# Patient Record
Sex: MALE | Race: BLACK OR AFRICAN AMERICAN | NOT HISPANIC OR LATINO | Employment: OTHER | URBAN - METROPOLITAN AREA
[De-identification: names, ages, dates, MRNs, and addresses within clinical notes are randomized per-mention and may not be internally consistent; named-entity substitution may affect disease eponyms.]

---

## 2017-01-06 ENCOUNTER — OFFICE VISIT (OUTPATIENT)
Dept: FAMILY MEDICINE | Facility: CLINIC | Age: 82
End: 2017-01-06
Payer: MEDICARE

## 2017-01-06 ENCOUNTER — LAB VISIT (OUTPATIENT)
Dept: LAB | Facility: HOSPITAL | Age: 82
End: 2017-01-06
Attending: FAMILY MEDICINE
Payer: MEDICARE

## 2017-01-06 VITALS
HEART RATE: 81 BPM | TEMPERATURE: 98 F | WEIGHT: 154.31 LBS | SYSTOLIC BLOOD PRESSURE: 139 MMHG | OXYGEN SATURATION: 98 % | DIASTOLIC BLOOD PRESSURE: 86 MMHG | BODY MASS INDEX: 24.8 KG/M2 | HEIGHT: 66 IN

## 2017-01-06 DIAGNOSIS — K92.2 GASTROINTESTINAL HEMORRHAGE, UNSPECIFIED GASTROINTESTINAL HEMORRHAGE TYPE: ICD-10-CM

## 2017-01-06 DIAGNOSIS — D50.9 IRON DEFICIENCY ANEMIA, UNSPECIFIED IRON DEFICIENCY ANEMIA TYPE: ICD-10-CM

## 2017-01-06 DIAGNOSIS — I10 BENIGN ESSENTIAL HYPERTENSION: ICD-10-CM

## 2017-01-06 DIAGNOSIS — K92.2 GASTROINTESTINAL HEMORRHAGE, UNSPECIFIED GASTROINTESTINAL HEMORRHAGE TYPE: Primary | ICD-10-CM

## 2017-01-06 DIAGNOSIS — Z79.82 LONG-TERM USE OF ASPIRIN THERAPY: ICD-10-CM

## 2017-01-06 LAB
ALBUMIN SERPL BCP-MCNC: 3.2 G/DL
ALP SERPL-CCNC: 81 U/L
ALT SERPL W/O P-5'-P-CCNC: 9 U/L
ANION GAP SERPL CALC-SCNC: 7 MMOL/L
AST SERPL-CCNC: 17 U/L
BASOPHILS # BLD AUTO: 0.07 K/UL
BASOPHILS NFR BLD: 0.9 %
BILIRUB SERPL-MCNC: 0.5 MG/DL
BUN SERPL-MCNC: 9 MG/DL
CALCIUM SERPL-MCNC: 9 MG/DL
CHLORIDE SERPL-SCNC: 107 MMOL/L
CO2 SERPL-SCNC: 27 MMOL/L
CREAT SERPL-MCNC: 1.2 MG/DL
DIFFERENTIAL METHOD: ABNORMAL
EOSINOPHIL # BLD AUTO: 0.2 K/UL
EOSINOPHIL NFR BLD: 2.3 %
ERYTHROCYTE [DISTWIDTH] IN BLOOD BY AUTOMATED COUNT: 15.6 %
EST. GFR  (AFRICAN AMERICAN): >60 ML/MIN/1.73 M^2
EST. GFR  (NON AFRICAN AMERICAN): 54 ML/MIN/1.73 M^2
GLUCOSE SERPL-MCNC: 94 MG/DL
HCT VFR BLD AUTO: 40.2 %
HGB BLD-MCNC: 12.6 G/DL
LYMPHOCYTES # BLD AUTO: 1.4 K/UL
LYMPHOCYTES NFR BLD: 16.8 %
MCH RBC QN AUTO: 27.8 PG
MCHC RBC AUTO-ENTMCNC: 31.3 %
MCV RBC AUTO: 89 FL
MONOCYTES # BLD AUTO: 0.8 K/UL
MONOCYTES NFR BLD: 9.3 %
NEUTROPHILS # BLD AUTO: 5.7 K/UL
NEUTROPHILS NFR BLD: 70.6 %
PLATELET # BLD AUTO: 214 K/UL
PMV BLD AUTO: 10.6 FL
POTASSIUM SERPL-SCNC: 4 MMOL/L
PROT SERPL-MCNC: 7.4 G/DL
RBC # BLD AUTO: 4.53 M/UL
SODIUM SERPL-SCNC: 141 MMOL/L
WBC # BLD AUTO: 8.14 K/UL

## 2017-01-06 PROCEDURE — 99999 PR PBB SHADOW E&M-EST. PATIENT-LVL III: CPT | Mod: PBBFAC,,, | Performed by: FAMILY MEDICINE

## 2017-01-06 PROCEDURE — 80053 COMPREHEN METABOLIC PANEL: CPT

## 2017-01-06 PROCEDURE — 99214 OFFICE O/P EST MOD 30 MIN: CPT | Mod: S$PBB,,, | Performed by: FAMILY MEDICINE

## 2017-01-06 PROCEDURE — 36415 COLL VENOUS BLD VENIPUNCTURE: CPT

## 2017-01-06 PROCEDURE — 99213 OFFICE O/P EST LOW 20 MIN: CPT | Mod: PBBFAC,PO | Performed by: FAMILY MEDICINE

## 2017-01-06 PROCEDURE — 85025 COMPLETE CBC W/AUTO DIFF WBC: CPT

## 2017-01-06 NOTE — MR AVS SNAPSHOT
[address]  [place] [address]  Phone: [phone]  Fax: [phone]                  Toby Green   2017 2:20 PM   Office Visit    Description:  Male : 1930   Provider:  Lindsey Du MD   Department:  [facility]           Reason for Visit     Follow-up           Diagnoses this Visit        Comments    Gastrointestinal hemorrhage, unspecified gastrointestinal hemorrhage type    -  Primary seen on EGD 2016, avoid spicy foods, alcohol, NSAIDS, follow up w/ GI Dr Zee 2016, start Zantac 150 mg nightly pending further instructions    Iron deficiency anemia, unspecified iron deficiency anemia type     taking ferrous sulfate off and on- and taking with colace. recheck blood count today, continue senna to prevent constipation, increase fiber and water     Long-term use of aspirin therapy         Benign essential hypertension                To Do List           Future Appointments        Provider Department Dept Phone    2017 4:00 PM APPOINTMENT LAB, [facility] [facility] [phone]    2017 10:40 AM Oswaldo Zee MD [facility] [phone]      Goals (5 Years of Data)     None      [facility]     [facility] -  Assistance  Registered nurses in the [facility] provide clinical advisement, health education, appointment booking, and other advisory services.  Call for this free service at [phone].             Medications           Message regarding Medications     Verify the changes and/or additions to your medication regime listed below are the same as discussed with your clinician today.  If any of these changes or additions are incorrect, please notify your healthcare provider.             Verify that the below list of medications is an accurate representation of the medications you are currently taking.  If none reported, the  "list may be blank. If incorrect, please contact your healthcare provider. Carry this list with you in case of emergency.           Current Medications     aspirin (ECOTRIN) 81 MG EC tablet Take 81 mg by mouth once daily.    diltiaZEM (CARDIZEM CD) 240 MG 24 hr capsule Take 1 capsule (240 mg total) by mouth once daily.    docusate sodium (COLACE) 100 MG capsule Take 1 capsule (100 mg total) by mouth 2 (two) times daily.    ferrous sulfate 325 mg (65 mg iron) Tab tablet Take 1 tablet (325 mg total) by mouth 3 (three) times daily.    finasteride (PROSCAR) 5 mg tablet TAKE 1 TABLET BY MOUTH EVERY DAY    LOTEMAX 0.5 % ophthalmic suspension PLACE 1 DROP IN RIGHT EYE DAILY    pravastatin (PRAVACHOL) 80 MG tablet Take 1 tablet (80 mg total) by mouth once daily.    tamsulosin (FLOMAX) 0.4 mg Cp24 TAKE ONE CAPSULE BY MOUTH ONCE DAILY AT  NIGHT    trazodone (DESYREL) 50 MG tablet Take 1 tablet (50 mg total) by mouth nightly as needed for Insomnia.           Clinical Reference Information           Vital Signs - Last Recorded  Most recent update: 1/6/2017  2:20 PM by Laila Ryan LPN    BP Pulse Temp Ht Wt SpO2    139/86 (BP Location: Right arm, Patient Position: Sitting, BP Method: Automatic) 81 98.4 °F (36.9 °C) (Oral) 5' 6" (1.676 m) 70 kg (154 lb 5.2 oz) 98%    BMI                24.91 kg/m2          Blood Pressure          Most Recent Value    BP  139/86      Allergies as of 1/6/2017     Aleve  [Naproxen Sodium]    Naproxen    Ticlid  [Ticlopidine]      Immunizations Administered on Date of Encounter - 1/6/2017     None      Orders Placed During Today's Visit     Future Labs/Procedures Expected by Expires    CBC auto differential  1/6/2017 4/6/2017    Comprehensive metabolic panel  1/6/2017 4/6/2017      "

## 2017-01-06 NOTE — PROGRESS NOTES
Office Visit    Patient Name: Toby Green    : 1930  MRN: [id number]    Subjective:  Toby is a 86 y.o. male who presents today for:    Follow-up (hospital f/u)    Here for hospital follow up:    Admitted 16- 16 for evaluation of upper GI bleeding. Risk factor include history of heavy alcohol consumption, though he reports stopping this several months prior to the GI bleed, and use of BC powder for 3 days in row leading up to episode. Had multiple episodes of BRB and melanotic stools preceding hospital admission.  Hemoglobin is 15 at baseline and noted to be 9.7 on presentation.  Fortunately it remained stable during admission at 9.7-10 and transfusion was not required.  EGD 2016 showed gastric erosion and erythema. Since discharge he has felt well with out lightheadedness, shortness of breath, chest pain, or palpitations.  Go PO intake, though he is eating spicy foods such as hot sausages.  He is back on his usual home meds including his BP medication and is sporadically taking Ferrous sulfate with colace.  Bowel movements have been overall normal since discharge and he has noticed no signs of bleeding.       Past Medical History  Past Medical History   Diagnosis Date    Benign essential hypertension     Borderline glaucoma, open angle with borderline findings 10/19/2012    BPH (benign prostatic hypertrophy)     Erectile dysfunction     GERD (gastroesophageal reflux disease)     Hyperlipidemia     Iron deficiency anemia 2016    Iron deficiency anemia 2016    Nuclear sclerosis 10/19/2012       Past Surgical History  Past Surgical History   Procedure Laterality Date    Tonsillectomy      Lump removal       head    Lump in back removed      Cataract extraction w/  intraocular lens implant  12     OD w/     Pars plana vitrectomy  12     OD w/ dr. tavarez    Retinal detachment surgery  3/26/13     Right eye       Family History  Family  "History   Problem Relation Age of Onset    Cancer Mother     Diabetes Mother     Hypertension Father     Stroke Father     Cancer Sister     Cancer Brother     Cataracts Paternal Grandmother     Glaucoma Paternal Grandmother     Hypertension Paternal Grandmother     Amblyopia Neg Hx     Blindness Neg Hx     Macular degeneration Neg Hx     Retinal detachment Neg Hx     Strabismus Neg Hx     Thyroid disease Neg Hx        Social History  Social History     Social History    Marital status:      Spouse name: N/A    Number of children: N/A    Years of education: N/A     Occupational History    Not on file.     Social History Main Topics    Smoking status: Former Smoker    Smokeless tobacco: Never Used    Alcohol use 0.5 oz/week     1 Standard drinks or equivalent per week      Comment: soically    Drug use: No    Sexual activity: Not on file     Other Topics Concern    Not on file     Social History Narrative    Wife -Ada       Current Medications  Medications reviewed and updated.     Allergies   Review of patient's allergies indicates:   Allergen Reactions    Aleve  [naproxen sodium]      Other reaction(s): stomch bleeding    Naproxen      Other reaction(s): bleeding    Ticlid  [ticlopidine]      Other reaction(s): extreme bleeding       Review of Systems (Pertinent positives)  Review of Systems   Respiratory: Negative for shortness of breath.    Cardiovascular: Negative for chest pain and palpitations.   Gastrointestinal: Negative for anal bleeding, blood in stool (resolved), constipation and diarrhea.   Neurological: Negative for dizziness and light-headedness.       Visit Vitals    /86 (BP Location: Right arm, Patient Position: Sitting, BP Method: Automatic)    Pulse 81    Temp 98.4 °F (36.9 °C) (Oral)    Ht 5' 6" (1.676 m)    Wt 70 kg (154 lb 5.2 oz)    SpO2 98%    BMI 24.91 kg/m2       Physical Exam   Constitutional: He is oriented to person, place, and time. He " appears well-developed and well-nourished. No distress.   HENT:   Head: Normocephalic and atraumatic.   Eyes: Conjunctivae are normal.   Cardiovascular: Normal rate, regular rhythm and normal heart sounds.    Pulmonary/Chest: Effort normal and breath sounds normal.   Abdominal: Soft.   Musculoskeletal: He exhibits no edema.   Neurological: He is alert and oriented to person, place, and time.   Psychiatric: He has a normal mood and affect.   Vitals reviewed.        Assessment/Plan:  Toby Green is a 86 y.o. male who presents today for :    Toby was seen today for follow-up.    Diagnoses and all orders for this visit:    Gastrointestinal hemorrhage, unspecified gastrointestinal hemorrhage type  Comments:  seen on EGD 12/13/2016, avoid spicy foods, alcohol, NSAIDS, follow up w/ GI Dr Zee 1/9/2016, start Zantac 150 mg nightly pending further instructions  Orders:  -     CBC auto differential; Future    Iron deficiency anemia, unspecified iron deficiency anemia type  Comments:  taking ferrous sulfate off and on- and taking with colace. recheck blood count today, continue senna to prevent constipation, increase fiber and water   Orders:  -     CBC auto differential; Future    Long-term use of aspirin therapy    Benign essential hypertension  -     Comprehensive metabolic panel; Future            ICD-10-CM ICD-9-CM    1. Gastrointestinal hemorrhage, unspecified gastrointestinal hemorrhage type K92.2 578.9 CBC auto differential    seen on EGD 12/13/2016, avoid spicy foods, alcohol, NSAIDS, follow up w/ GI Dr Zee 1/9/2016, start Zantac 150 mg nightly pending further instructions   2. Iron deficiency anemia, unspecified iron deficiency anemia type D50.9 280.9 CBC auto differential    taking ferrous sulfate off and on- and taking with colace. recheck blood count today, continue senna to prevent constipation, increase fiber and water    3. Long-term use of aspirin therapy Z79.82 V58.66    4. Benign essential  hypertension I10 401.1 Comprehensive metabolic panel       Return in about 6 months (around 7/6/2017) for return as needed for new concerns.

## 2017-01-09 ENCOUNTER — OFFICE VISIT (OUTPATIENT)
Dept: GASTROENTEROLOGY | Facility: CLINIC | Age: 82
End: 2017-01-09
Payer: MEDICARE

## 2017-01-09 VITALS
HEIGHT: 66 IN | WEIGHT: 154.31 LBS | HEART RATE: 84 BPM | BODY MASS INDEX: 24.8 KG/M2 | DIASTOLIC BLOOD PRESSURE: 72 MMHG | SYSTOLIC BLOOD PRESSURE: 169 MMHG

## 2017-01-09 DIAGNOSIS — D64.9 ANEMIA, UNSPECIFIED TYPE: ICD-10-CM

## 2017-01-09 DIAGNOSIS — K92.2 GASTROINTESTINAL HEMORRHAGE, UNSPECIFIED GASTROINTESTINAL HEMORRHAGE TYPE: Primary | ICD-10-CM

## 2017-01-09 PROCEDURE — 99213 OFFICE O/P EST LOW 20 MIN: CPT | Mod: PBBFAC,PN | Performed by: INTERNAL MEDICINE

## 2017-01-09 PROCEDURE — 99999 PR PBB SHADOW E&M-EST. PATIENT-LVL III: CPT | Mod: PBBFAC,,, | Performed by: INTERNAL MEDICINE

## 2017-01-09 PROCEDURE — 99213 OFFICE O/P EST LOW 20 MIN: CPT | Mod: S$PBB,,, | Performed by: INTERNAL MEDICINE

## 2017-01-09 RX ORDER — BROMPHENIRAMINE MALEATE, PSEUDOEPHEDRINE HYDROCHLORIDE, AND DEXTROMETHORPHAN HYDROBROMIDE 2; 30; 10 MG/5ML; MG/5ML; MG/5ML
SYRUP ORAL
Refills: 0 | COMMUNITY
Start: 2016-12-26 | End: 2017-07-31

## 2017-01-09 RX ORDER — LOTEPREDNOL ETABONATE 5 MG/G
GEL OPHTHALMIC
Refills: 6 | Status: ON HOLD | COMMUNITY
Start: 2016-12-19 | End: 2020-10-22

## 2017-01-09 RX ORDER — OMEPRAZOLE 40 MG/1
40 CAPSULE, DELAYED RELEASE ORAL DAILY
Qty: 30 CAPSULE | Refills: 11 | Status: SHIPPED | OUTPATIENT
Start: 2017-01-09 | End: 2017-02-08 | Stop reason: SDUPTHER

## 2017-01-09 NOTE — PROGRESS NOTES
Subjective:       Patient ID: Toby Green is a 86 y.o. male.    Chief Complaint: Follow-up    This is an 87yo male who presents for a hospital follow up regarding dark stools. It had been occurring for about 5 days prior to hospitalization and intermittently for months before that.  An EGD was done revealing moderate gastritis and a small hiatal hernia. He has not been on PPI therapy. No further overt bleeding at that time. He also had a mild, normocytic anemia and was on iron therapy. I reviewed his recent labs with normalization of his hct and his iron has been decreased to daily.   He maintains a high fiber diet. His family is present to give additional history.     HPI  Review of Systems   Constitutional: Negative for chills and fever.   HENT: Negative for postnasal drip and trouble swallowing.    Eyes: Negative for pain and visual disturbance.   Respiratory: Negative for cough, choking and shortness of breath.    Cardiovascular: Negative for chest pain and leg swelling.   Gastrointestinal: Negative for abdominal distention, abdominal pain, anal bleeding, blood in stool, constipation, diarrhea, nausea, rectal pain and vomiting.   Endocrine: Negative for cold intolerance and heat intolerance.   Genitourinary: Negative for difficulty urinating and hematuria.   Neurological: Negative for dizziness and numbness.   Hematological: Negative for adenopathy. Does not bruise/bleed easily.   Psychiatric/Behavioral: Negative for agitation and confusion.       Objective:      Physical Exam   Constitutional: He is oriented to person, place, and time. He appears well-developed and well-nourished. No distress.   HENT:   Head: Normocephalic.   Eyes: Conjunctivae are normal. No scleral icterus.   Neck: No tracheal deviation present. No thyromegaly present.   Cardiovascular: Normal rate, regular rhythm and normal heart sounds.  Exam reveals no gallop and no friction rub.    No murmur heard.  Pulmonary/Chest: Effort normal  and breath sounds normal. He has no wheezes. He has no rales.   Abdominal: Soft. Bowel sounds are normal. He exhibits no distension. There is no tenderness. There is no rebound and no guarding.   Musculoskeletal: Normal range of motion. He exhibits no edema or tenderness.   Neurological: He is alert and oriented to person, place, and time.   Skin: He is not diaphoretic.   Psychiatric: He has a normal mood and affect. His behavior is normal.   Nursing note and vitals reviewed.      Assessment:       1. Gastrointestinal hemorrhage, unspecified gastrointestinal hemorrhage type    2. Anemia, unspecified type        Plan:   1. Agree with decrease in iron  2. Reviewed labs and procedure findings with pt/family  3. RTC in 3 months

## 2017-01-19 ENCOUNTER — TELEPHONE (OUTPATIENT)
Dept: FAMILY MEDICINE | Facility: CLINIC | Age: 82
End: 2017-01-19

## 2017-01-19 NOTE — TELEPHONE ENCOUNTER
Called patient to review lab results. Spoke with patients daughter. She verbalized understanding and stated she already spoke with dr. Du

## 2017-02-07 RX ORDER — OMEPRAZOLE 40 MG/1
CAPSULE, DELAYED RELEASE ORAL
Refills: 11 | COMMUNITY
Start: 2017-01-11 | End: 2017-07-31 | Stop reason: SDUPTHER

## 2017-02-07 RX ORDER — CYCLOBENZAPRINE HCL 5 MG
5 TABLET ORAL 3 TIMES DAILY PRN
Refills: 3 | COMMUNITY
Start: 2017-01-15 | End: 2020-05-15 | Stop reason: ALTCHOICE

## 2017-02-08 RX ORDER — OMEPRAZOLE 40 MG/1
40 CAPSULE, DELAYED RELEASE ORAL DAILY
Qty: 90 CAPSULE | Refills: 3 | Status: SHIPPED | OUTPATIENT
Start: 2017-02-08 | End: 2019-12-09 | Stop reason: SDUPTHER

## 2017-02-08 NOTE — TELEPHONE ENCOUNTER
----- Message from Kim Phillips sent at 2/7/2017  4:42 PM CST -----  Contact: [phone]/ self  Patient would like to get a 90 day prescription. Please advise.

## 2017-02-08 NOTE — TELEPHONE ENCOUNTER
Returned patients call. Spoke with the patients wife. SHe stated he was written a prescription for omeprazole. The refills were written monthly. She would like to obtain a  3 los supplies sent to the pharmacy on file because it is hard for her to get back and forth. Please advise.

## 2017-03-09 DIAGNOSIS — N40.0 BENIGN NON-NODULAR PROSTATIC HYPERPLASIA WITHOUT LOWER URINARY TRACT SYMPTOMS: ICD-10-CM

## 2017-03-09 RX ORDER — TAMSULOSIN HYDROCHLORIDE 0.4 MG/1
CAPSULE ORAL
Qty: 90 CAPSULE | Refills: 3 | OUTPATIENT
Start: 2017-03-09 | End: 2017-03-14 | Stop reason: SDUPTHER

## 2017-03-14 DIAGNOSIS — N40.0 BENIGN NON-NODULAR PROSTATIC HYPERPLASIA WITHOUT LOWER URINARY TRACT SYMPTOMS: ICD-10-CM

## 2017-03-14 RX ORDER — TAMSULOSIN HYDROCHLORIDE 0.4 MG/1
CAPSULE ORAL
Qty: 90 CAPSULE | Refills: 3 | Status: SHIPPED | OUTPATIENT
Start: 2017-03-14 | End: 2018-03-07 | Stop reason: SDUPTHER

## 2017-03-25 DIAGNOSIS — E78.5 HYPERLIPIDEMIA: ICD-10-CM

## 2017-03-26 RX ORDER — PRAVASTATIN SODIUM 80 MG/1
TABLET ORAL
Qty: 90 TABLET | Refills: 5 | Status: SHIPPED | OUTPATIENT
Start: 2017-03-26 | End: 2018-03-23 | Stop reason: SDUPTHER

## 2017-04-08 RX ORDER — FINASTERIDE 5 MG/1
TABLET, FILM COATED ORAL
Qty: 90 TABLET | Refills: 3 | Status: SHIPPED | OUTPATIENT
Start: 2017-04-08 | End: 2018-03-07 | Stop reason: SDUPTHER

## 2017-07-31 ENCOUNTER — OFFICE VISIT (OUTPATIENT)
Dept: FAMILY MEDICINE | Facility: CLINIC | Age: 82
End: 2017-07-31
Payer: MEDICARE

## 2017-07-31 VITALS
SYSTOLIC BLOOD PRESSURE: 144 MMHG | DIASTOLIC BLOOD PRESSURE: 92 MMHG | HEART RATE: 95 BPM | OXYGEN SATURATION: 95 % | WEIGHT: 160.25 LBS | BODY MASS INDEX: 23.74 KG/M2 | HEIGHT: 69 IN

## 2017-07-31 DIAGNOSIS — Z87.19 HISTORY OF GI BLEED: ICD-10-CM

## 2017-07-31 DIAGNOSIS — Z00.00 ROUTINE HEALTH MAINTENANCE: ICD-10-CM

## 2017-07-31 DIAGNOSIS — K21.00 GASTROESOPHAGEAL REFLUX DISEASE WITH ESOPHAGITIS: Primary | ICD-10-CM

## 2017-07-31 DIAGNOSIS — E78.5 HYPERLIPIDEMIA, UNSPECIFIED HYPERLIPIDEMIA TYPE: ICD-10-CM

## 2017-07-31 DIAGNOSIS — R01.1 SYSTOLIC MURMUR: ICD-10-CM

## 2017-07-31 DIAGNOSIS — I10 BENIGN ESSENTIAL HYPERTENSION: ICD-10-CM

## 2017-07-31 DIAGNOSIS — Z79.899 MEDICATION MANAGEMENT: ICD-10-CM

## 2017-07-31 DIAGNOSIS — Z12.11 COLON CANCER SCREENING: ICD-10-CM

## 2017-07-31 DIAGNOSIS — Z86.010 HISTORY OF COLON POLYPS: ICD-10-CM

## 2017-07-31 DIAGNOSIS — D50.9 IRON DEFICIENCY ANEMIA, UNSPECIFIED IRON DEFICIENCY ANEMIA TYPE: ICD-10-CM

## 2017-07-31 DIAGNOSIS — Z79.82 LONG-TERM USE OF ASPIRIN THERAPY: ICD-10-CM

## 2017-07-31 DIAGNOSIS — N40.0 BENIGN PROSTATIC HYPERPLASIA WITHOUT LOWER URINARY TRACT SYMPTOMS: ICD-10-CM

## 2017-07-31 DIAGNOSIS — F51.01 PRIMARY INSOMNIA: ICD-10-CM

## 2017-07-31 PROBLEM — D64.9 ANEMIA: Status: RESOLVED | Noted: 2017-01-09 | Resolved: 2017-07-31

## 2017-07-31 PROCEDURE — 99214 OFFICE O/P EST MOD 30 MIN: CPT | Mod: PBBFAC,PO | Performed by: FAMILY MEDICINE

## 2017-07-31 PROCEDURE — 1159F MED LIST DOCD IN RCRD: CPT | Mod: ,,, | Performed by: FAMILY MEDICINE

## 2017-07-31 PROCEDURE — 1157F ADVNC CARE PLAN IN RCRD: CPT | Mod: ,,, | Performed by: FAMILY MEDICINE

## 2017-07-31 PROCEDURE — 99999 PR PBB SHADOW E&M-EST. PATIENT-LVL IV: CPT | Mod: PBBFAC,,, | Performed by: FAMILY MEDICINE

## 2017-07-31 PROCEDURE — 1125F AMNT PAIN NOTED PAIN PRSNT: CPT | Mod: ,,, | Performed by: FAMILY MEDICINE

## 2017-07-31 PROCEDURE — 99214 OFFICE O/P EST MOD 30 MIN: CPT | Mod: S$PBB,,, | Performed by: FAMILY MEDICINE

## 2017-07-31 RX ORDER — TRAZODONE HYDROCHLORIDE 50 MG/1
50 TABLET ORAL NIGHTLY PRN
Qty: 90 TABLET | Refills: 3 | Status: SHIPPED | OUTPATIENT
Start: 2017-07-31 | End: 2018-09-01 | Stop reason: SDUPTHER

## 2017-07-31 NOTE — PROGRESS NOTES
Office Visit    Patient Name: Toby Green    : 1930  MRN: [id number]    Subjective:  Toby is a 87 y.o. male who presents today for:    Annual Exam    Toby Green presents today for annual wellness exam and monitoring of chronic conditions-- GERD with history of GI bleed (now stable on Prilosec), history of iron deficiency anemia resolved on recent labs (currently taking 1 iron tab daily), hypertension now controlled off meds, HLD managed with pravastatin, insomnia managed with trazodone, BPH stable on Flomax and Proscar.      They have been feeling overall well. Chronic conditions controlled.       General lifestyle habits are as follows:  Diet is described as good-- lives with daughter who tries to provide healthy meals and really cut out alcohol, exercise is described as fair-- walks about 10 minutes on a regular daily basis, sleep is described as fair-- improved from previous and Trazodone helps. Weight is stable over the last year.      Immunizations: PREVNAR 13 2016, Pneumovax 23 10/18/2011, tetanus advised through he pharmacy     Screening Tests: colonoscopy 2014--> polyps w/ recommendation to repeat in 3 years     Eye/Dental: up to date with eye, due for dental           Past Medical History  Past Medical History:   Diagnosis Date    Benign essential hypertension     Borderline glaucoma, open angle with borderline findings 10/19/2012    BPH (benign prostatic hypertrophy)     Erectile dysfunction     GERD (gastroesophageal reflux disease)     Hyperlipidemia     Iron deficiency anemia 2016    Nuclear sclerosis 10/19/2012       Past Surgical History  Past Surgical History:   Procedure Laterality Date    CATARACT EXTRACTION W/  INTRAOCULAR LENS IMPLANT  12    OD w/     lump in back removed      lump removal      head    PARS PLANA VITRECTOMY  12    OD w/ dr. tavarez    RETINAL DETACHMENT SURGERY  3/26/13    Right eye    TONSILLECTOMY          Family History  Family History   Problem Relation Age of Onset    Cancer Mother     Diabetes Mother     Hypertension Father     Stroke Father     Cancer Sister     Cancer Brother     Cataracts Paternal Grandmother     Glaucoma Paternal Grandmother     Hypertension Paternal Grandmother     Amblyopia Neg Hx     Blindness Neg Hx     Macular degeneration Neg Hx     Retinal detachment Neg Hx     Strabismus Neg Hx     Thyroid disease Neg Hx        Social History  Social History     Social History    Marital status:      Spouse name: N/A    Number of children: N/A    Years of education: N/A     Occupational History    Not on file.     Social History Main Topics    Smoking status: Former Smoker    Smokeless tobacco: Never Used    Alcohol use 0.5 oz/week     1 Standard drinks or equivalent per week      Comment: soically    Drug use: No    Sexual activity: Not on file     Other Topics Concern    Not on file     Social History Narrative    Wife -Ada       Current Medications  Medications reviewed and updated.     Allergies   Review of patient's allergies indicates:   Allergen Reactions    Aleve  [naproxen sodium]      Other reaction(s): stomch bleeding    Naproxen      Other reaction(s): bleeding    Ticlid  [ticlopidine]      Other reaction(s): extreme bleeding       Review of Systems (Pertinent positives)  Review of Systems   Constitutional: Negative for unexpected weight change.   HENT: Positive for hearing loss (stable). Negative for trouble swallowing.    Eyes: Negative for visual disturbance.   Respiratory: Negative for shortness of breath.    Cardiovascular: Negative for chest pain and leg swelling.   Gastrointestinal: Negative for blood in stool, constipation and diarrhea.   Genitourinary: Negative for dysuria and frequency.   Neurological: Negative for dizziness and light-headedness.   Psychiatric/Behavioral: Positive for sleep disturbance (managed with trazodone).       BP (!)  "144/92   Pulse 95   Ht 5' 9" (1.753 m)   Wt 72.7 kg (160 lb 4.4 oz)   SpO2 95%   BMI 23.67 kg/m²     Physical Exam   Constitutional: He is oriented to person, place, and time. He appears well-developed and well-nourished. No distress.   HENT:   Head: Normocephalic and atraumatic.   Right Ear: External ear normal.   Left Ear: External ear normal.   Nose: Nose normal.   Mouth/Throat: Oropharynx is clear and moist. No oropharyngeal exudate.   Eyes: Conjunctivae are normal. No scleral icterus.   Neck: No tracheal deviation present. No thyromegaly present.   Cardiovascular: Normal rate, regular rhythm and intact distal pulses.    Murmur heard.   Systolic murmur is present with a grade of 2/6   Pulmonary/Chest: Effort normal and breath sounds normal.   Abdominal: Soft. Bowel sounds are normal. He exhibits no distension and no mass. There is no tenderness. No hernia.   Musculoskeletal: Normal range of motion.   Lymphadenopathy:     He has no cervical adenopathy.   Neurological: He is alert and oriented to person, place, and time. He has normal reflexes.   Skin: Skin is warm and dry. No rash noted.   Psychiatric: He has a normal mood and affect.   Vitals reviewed.        Assessment/Plan:  Toby Green is a 87 y.o. male who presents today for :    Toby was seen today for annual exam.    Diagnoses and all orders for this visit:    Gastroesophageal reflux disease with esophagitis  Comments:  controlled on omeprazole    Benign essential hypertension  Comments:  stable off of meds, monitor  Orders:  -     TSH; Future    History of GI bleed  -     Occult blood x 1, stool; Future  -     Occult blood x 1, stool; Future  -     Occult blood x 1, stool; Future    Hyperlipidemia, unspecified hyperlipidemia type  -     Comprehensive metabolic panel; Future  -     Lipid panel; Future    Iron deficiency anemia, unspecified iron deficiency anemia type  Comments:  resolved on most recent labs, recheck levels CBC iron studies " etc  Orders:  -     CBC auto differential; Future  -     Ferritin; Future  -     Iron and TIBC; Future    Long-term use of aspirin therapy    Benign prostatic hyperplasia without lower urinary tract symptoms  Comments:  stable on flomax/finasteride      Medication management  -     Comprehensive metabolic panel; Future  -     Lipid panel; Future  -     CBC auto differential; Future  -     TSH; Future  -     Vitamin D; Future  -     Ferritin; Future  -     Iron and TIBC; Future    Routine health maintenance    History of colon polyps  -     Occult blood x 1, stool; Future  -     Occult blood x 1, stool; Future  -     Occult blood x 1, stool; Future    Colon cancer screening  Comments:  due for repeat colonoscopy due to history of polyps, but due to age and comorbidities will start with fecal occult blood screening  Orders:  -     Occult blood x 1, stool; Future  -     Occult blood x 1, stool; Future  -     Occult blood x 1, stool; Future    Primary insomnia  -     trazodone (DESYREL) 50 MG tablet; Take 1 tablet (50 mg total) by mouth nightly as needed for Insomnia.    Systolic murmur  Comments:  currently asymptomatic, will monitor            ICD-10-CM ICD-9-CM    1. Gastroesophageal reflux disease with esophagitis K21.0 530.11     controlled on omeprazole   2. Benign essential hypertension I10 401.1 TSH    stable off of meds, monitor   3. History of GI bleed Z87.19 V12.79 Occult blood x 1, stool      Occult blood x 1, stool      Occult blood x 1, stool   4. Hyperlipidemia, unspecified hyperlipidemia type E78.5 272.4 Comprehensive metabolic panel      Lipid panel   5. Iron deficiency anemia, unspecified iron deficiency anemia type D50.9 280.9 CBC auto differential      Ferritin      Iron and TIBC    resolved on most recent labs, recheck levels CBC iron studies etc   6. Long-term use of aspirin therapy Z79.82 V58.66    7. Benign prostatic hyperplasia without lower urinary tract symptoms N40.0 600.00     stable on  flomax/finasteride     8. Medication management Z79.899 V58.69 Comprehensive metabolic panel      Lipid panel      CBC auto differential      TSH      Vitamin D      Ferritin      Iron and TIBC   9. Routine health maintenance Z00.00 V70.0    10. History of colon polyps Z86.010 V12.72 Occult blood x 1, stool      Occult blood x 1, stool      Occult blood x 1, stool   11. Colon cancer screening Z12.11 V76.51 Occult blood x 1, stool      Occult blood x 1, stool      Occult blood x 1, stool    due for repeat colonoscopy due to history of polyps, but due to age and comorbidities will start with fecal occult blood screening   12. Primary insomnia F51.01 307.42 trazodone (DESYREL) 50 MG tablet   13. Systolic murmur R01.1 785.2     currently asymptomatic, will monitor       Return in about 6 months (around 1/31/2018) for return as needed for new concerns.

## 2017-08-14 ENCOUNTER — LAB VISIT (OUTPATIENT)
Dept: LAB | Facility: HOSPITAL | Age: 82
End: 2017-08-14
Attending: FAMILY MEDICINE
Payer: MEDICARE

## 2017-08-14 DIAGNOSIS — D50.9 IRON DEFICIENCY ANEMIA, UNSPECIFIED IRON DEFICIENCY ANEMIA TYPE: ICD-10-CM

## 2017-08-14 DIAGNOSIS — Z87.19 HISTORY OF GI BLEED: ICD-10-CM

## 2017-08-14 DIAGNOSIS — E78.5 HYPERLIPIDEMIA, UNSPECIFIED HYPERLIPIDEMIA TYPE: ICD-10-CM

## 2017-08-14 DIAGNOSIS — I10 BENIGN ESSENTIAL HYPERTENSION: ICD-10-CM

## 2017-08-14 DIAGNOSIS — Z79.899 MEDICATION MANAGEMENT: ICD-10-CM

## 2017-08-14 DIAGNOSIS — Z12.11 COLON CANCER SCREENING: ICD-10-CM

## 2017-08-14 DIAGNOSIS — Z86.010 HISTORY OF COLON POLYPS: ICD-10-CM

## 2017-08-14 LAB
25(OH)D3+25(OH)D2 SERPL-MCNC: 27 NG/ML
ALBUMIN SERPL BCP-MCNC: 4.2 G/DL
ALP SERPL-CCNC: 77 U/L
ALT SERPL W/O P-5'-P-CCNC: 30 U/L
ANION GAP SERPL CALC-SCNC: 12 MMOL/L
AST SERPL-CCNC: 23 U/L
BASOPHILS # BLD AUTO: 0.03 K/UL
BASOPHILS NFR BLD: 0.5 %
BILIRUB SERPL-MCNC: 0.7 MG/DL
BUN SERPL-MCNC: 14 MG/DL
CALCIUM SERPL-MCNC: 9 MG/DL
CHLORIDE SERPL-SCNC: 107 MMOL/L
CHOLEST/HDLC SERPL: 3.8 {RATIO}
CO2 SERPL-SCNC: 25 MMOL/L
CREAT SERPL-MCNC: 1.27 MG/DL
DIFFERENTIAL METHOD: ABNORMAL
EOSINOPHIL # BLD AUTO: 0.2 K/UL
EOSINOPHIL NFR BLD: 2.5 %
ERYTHROCYTE [DISTWIDTH] IN BLOOD BY AUTOMATED COUNT: 14.1 %
EST. GFR  (AFRICAN AMERICAN): 58.3 ML/MIN/1.73 M^2
EST. GFR  (NON AFRICAN AMERICAN): 50.5 ML/MIN/1.73 M^2
FERRITIN SERPL-MCNC: 49 NG/ML
GLUCOSE SERPL-MCNC: 83 MG/DL
HCT VFR BLD AUTO: 49.3 %
HDL/CHOLESTEROL RATIO: 26.4 %
HDLC SERPL-MCNC: 174 MG/DL
HDLC SERPL-MCNC: 46 MG/DL
HGB BLD-MCNC: 15.8 G/DL
IRON SERPL-MCNC: 66 UG/DL
LDLC SERPL CALC-MCNC: 116.4 MG/DL
LYMPHOCYTES # BLD AUTO: 1.4 K/UL
LYMPHOCYTES NFR BLD: 23.4 %
MCH RBC QN AUTO: 29.4 PG
MCHC RBC AUTO-ENTMCNC: 32 G/DL
MCV RBC AUTO: 92 FL
MONOCYTES # BLD AUTO: 0.5 K/UL
MONOCYTES NFR BLD: 8.9 %
NEUTROPHILS # BLD AUTO: 3.9 K/UL
NEUTROPHILS NFR BLD: 64.4 %
NONHDLC SERPL-MCNC: 128 MG/DL
PLATELET # BLD AUTO: 103 K/UL
PMV BLD AUTO: 12.4 FL
POTASSIUM SERPL-SCNC: 4 MMOL/L
PROT SERPL-MCNC: 8 G/DL
RBC # BLD AUTO: 5.38 M/UL
SATURATED IRON: 21 %
SODIUM SERPL-SCNC: 144 MMOL/L
TOTAL IRON BINDING CAPACITY: 309 UG/DL
TRANSFERRIN SERPL-MCNC: 209 MG/DL
TRIGL SERPL-MCNC: 58 MG/DL
TSH SERPL DL<=0.005 MIU/L-ACNC: 0.73 UIU/ML
WBC # BLD AUTO: 5.98 K/UL

## 2017-08-14 PROCEDURE — 85025 COMPLETE CBC W/AUTO DIFF WBC: CPT | Mod: PO

## 2017-08-14 PROCEDURE — 84443 ASSAY THYROID STIM HORMONE: CPT | Mod: PO

## 2017-08-14 PROCEDURE — 80053 COMPREHEN METABOLIC PANEL: CPT | Mod: PO

## 2017-08-14 PROCEDURE — 82728 ASSAY OF FERRITIN: CPT

## 2017-08-14 PROCEDURE — 83540 ASSAY OF IRON: CPT | Mod: PO

## 2017-08-14 PROCEDURE — 80061 LIPID PANEL: CPT

## 2017-08-14 PROCEDURE — 36415 COLL VENOUS BLD VENIPUNCTURE: CPT | Mod: PO

## 2017-08-14 PROCEDURE — 82306 VITAMIN D 25 HYDROXY: CPT | Mod: PO

## 2017-08-18 ENCOUNTER — TELEPHONE (OUTPATIENT)
Dept: FAMILY MEDICINE | Facility: CLINIC | Age: 82
End: 2017-08-18

## 2017-08-18 DIAGNOSIS — Z79.899 MEDICATION MANAGEMENT: ICD-10-CM

## 2017-08-18 DIAGNOSIS — E55.9 VITAMIN D DEFICIENCY: ICD-10-CM

## 2017-08-18 DIAGNOSIS — E78.5 HYPERLIPIDEMIA, UNSPECIFIED HYPERLIPIDEMIA TYPE: ICD-10-CM

## 2017-08-18 DIAGNOSIS — I10 BENIGN ESSENTIAL HYPERTENSION: ICD-10-CM

## 2017-08-18 DIAGNOSIS — Z87.19 HISTORY OF GI BLEED: Primary | ICD-10-CM

## 2017-08-21 ENCOUNTER — TELEPHONE (OUTPATIENT)
Dept: FAMILY MEDICINE | Facility: CLINIC | Age: 82
End: 2017-08-21

## 2017-08-21 NOTE — TELEPHONE ENCOUNTER
Called patient to review lab results: Labs overall look very good.  Vitamin D level is mildly low, so I would advise adding 2000 international units with breakfast.  Iron stores have significantly improved over the last year since his episode of gastrointestinal bleeding and associated anemia has resolved.  Platelet count is a little bit low and we will recheck this with labs in 6 months.  There is a very slight decline in kidney function, so I would advise reminding him to ensure adequate daily hydration.  We'll place orders for follow-up labs to be done prior to six-month follow-up. Left a message requesting a call back.

## 2017-10-21 DIAGNOSIS — I10 BENIGN ESSENTIAL HYPERTENSION: ICD-10-CM

## 2017-10-21 RX ORDER — DILTIAZEM HYDROCHLORIDE 240 MG/1
240 CAPSULE, COATED, EXTENDED RELEASE ORAL DAILY
Qty: 90 CAPSULE | Refills: 3 | Status: ON HOLD | OUTPATIENT
Start: 2017-10-21 | End: 2017-10-29

## 2017-10-21 NOTE — TELEPHONE ENCOUNTER
----- Message from Gillian Walker LPN sent at 10/21/2017 10:17 AM CDT -----  Contact: Erna, daughter, [phone]      ----- Message -----  From: Huma Bradley  Sent: 10/21/2017   9:25 AM  To: Faustina Carrillo A Staff    Called in requesting for patient to be seen sooner than  11/28, states she needs to renew her physical and speech therapy. Please advise.

## 2017-10-21 NOTE — TELEPHONE ENCOUNTER
----- Message from Huma Mirna sent at 10/21/2017  9:25 AM CDT -----  Contact: spouse, [phone]  Requests patient's Diotiazem medication refill sent to pharmacy today please, states he is out.

## 2017-10-29 ENCOUNTER — HOSPITAL ENCOUNTER (INPATIENT)
Facility: HOSPITAL | Age: 82
LOS: 1 days | Discharge: HOME | DRG: 378 | End: 2017-10-31
Attending: EMERGENCY MEDICINE | Admitting: INTERNAL MEDICINE
Payer: MEDICARE

## 2017-10-29 DIAGNOSIS — K92.2 GASTROINTESTINAL HEMORRHAGE, UNSPECIFIED GASTROINTESTINAL HEMORRHAGE TYPE: ICD-10-CM

## 2017-10-29 DIAGNOSIS — K92.2 ACUTE UPPER GI BLEED: ICD-10-CM

## 2017-10-29 DIAGNOSIS — N40.0 BENIGN PROSTATIC HYPERPLASIA WITHOUT LOWER URINARY TRACT SYMPTOMS: ICD-10-CM

## 2017-10-29 DIAGNOSIS — D69.6 THROMBOCYTOPENIA: ICD-10-CM

## 2017-10-29 DIAGNOSIS — F10.10 ALCOHOL ABUSE: ICD-10-CM

## 2017-10-29 DIAGNOSIS — R17 ELEVATED BILIRUBIN: ICD-10-CM

## 2017-10-29 DIAGNOSIS — D62 ACUTE BLOOD LOSS ANEMIA: ICD-10-CM

## 2017-10-29 DIAGNOSIS — K21.00 GASTROESOPHAGEAL REFLUX DISEASE WITH ESOPHAGITIS: ICD-10-CM

## 2017-10-29 DIAGNOSIS — I47.10 SVT (SUPRAVENTRICULAR TACHYCARDIA): ICD-10-CM

## 2017-10-29 DIAGNOSIS — K92.1 MELENA: Primary | ICD-10-CM

## 2017-10-29 DIAGNOSIS — K29.80 DUODENITIS WITHOUT BLEEDING: ICD-10-CM

## 2017-10-29 DIAGNOSIS — K92.1 GASTROINTESTINAL HEMORRHAGE WITH MELENA: ICD-10-CM

## 2017-10-29 DIAGNOSIS — E87.5 HYPERKALEMIA: ICD-10-CM

## 2017-10-29 DIAGNOSIS — I10 BENIGN ESSENTIAL HYPERTENSION: ICD-10-CM

## 2017-10-29 DIAGNOSIS — Z87.19 HISTORY OF GI BLEED: ICD-10-CM

## 2017-10-29 DIAGNOSIS — I95.1 ORTHOSTATIC HYPOTENSION: ICD-10-CM

## 2017-10-29 DIAGNOSIS — D51.9 ANEMIA DUE TO VITAMIN B12 DEFICIENCY, UNSPECIFIED B12 DEFICIENCY TYPE: ICD-10-CM

## 2017-10-29 DIAGNOSIS — E78.2 MIXED HYPERLIPIDEMIA: ICD-10-CM

## 2017-10-29 DIAGNOSIS — D50.0 IRON DEFICIENCY ANEMIA DUE TO CHRONIC BLOOD LOSS: ICD-10-CM

## 2017-10-29 LAB
ALBUMIN SERPL BCP-MCNC: 4.3 G/DL
ALP SERPL-CCNC: 61 U/L
ALT SERPL W/O P-5'-P-CCNC: <6 U/L
ANION GAP SERPL CALC-SCNC: 10 MMOL/L
AST SERPL-CCNC: 44 U/L
BASOPHILS # BLD AUTO: 0.04 K/UL
BASOPHILS # BLD AUTO: 0.04 K/UL
BASOPHILS NFR BLD: 0.6 %
BASOPHILS NFR BLD: 0.6 %
BILIRUB DIRECT SERPL-MCNC: 0.3 MG/DL
BILIRUB SERPL-MCNC: 1.6 MG/DL
BUN SERPL-MCNC: 14 MG/DL
CALCIUM SERPL-MCNC: 8.8 MG/DL
CHLORIDE SERPL-SCNC: 107 MMOL/L
CHOLEST SERPL-MCNC: 135 MG/DL
CHOLEST/HDLC SERPL: 3.8 {RATIO}
CO2 SERPL-SCNC: 23 MMOL/L
CREAT SERPL-MCNC: 1.27 MG/DL
DIFFERENTIAL METHOD: ABNORMAL
DIFFERENTIAL METHOD: ABNORMAL
EOSINOPHIL # BLD AUTO: 0.1 K/UL
EOSINOPHIL # BLD AUTO: 0.2 K/UL
EOSINOPHIL NFR BLD: 1.9 %
EOSINOPHIL NFR BLD: 2.6 %
ERYTHROCYTE [DISTWIDTH] IN BLOOD BY AUTOMATED COUNT: 13.8 %
ERYTHROCYTE [DISTWIDTH] IN BLOOD BY AUTOMATED COUNT: 14.3 %
EST. GFR  (AFRICAN AMERICAN): 58.3 ML/MIN/1.73 M^2
EST. GFR  (NON AFRICAN AMERICAN): 50.5 ML/MIN/1.73 M^2
FERRITIN SERPL-MCNC: 46 NG/ML
FOLATE SERPL-MCNC: 11.9 NG/ML
GLUCOSE SERPL-MCNC: 117 MG/DL
HCT VFR BLD AUTO: 39.4 %
HCT VFR BLD AUTO: 44.8 %
HDLC SERPL-MCNC: 36 MG/DL
HDLC SERPL: 26.7 %
HGB BLD-MCNC: 12.6 G/DL
HGB BLD-MCNC: 14.6 G/DL
INR PPP: 1.3
IRON SERPL-MCNC: 59 UG/DL
LDLC SERPL CALC-MCNC: 85.4 MG/DL
LYMPHOCYTES # BLD AUTO: 1.1 K/UL
LYMPHOCYTES # BLD AUTO: 1.4 K/UL
LYMPHOCYTES NFR BLD: 16.6 %
LYMPHOCYTES NFR BLD: 20.3 %
MCH RBC QN AUTO: 29.5 PG
MCH RBC QN AUTO: 29.9 PG
MCHC RBC AUTO-ENTMCNC: 32 G/DL
MCHC RBC AUTO-ENTMCNC: 32.6 G/DL
MCV RBC AUTO: 92 FL
MCV RBC AUTO: 92 FL
MONOCYTES # BLD AUTO: 0.5 K/UL
MONOCYTES # BLD AUTO: 0.7 K/UL
MONOCYTES NFR BLD: 8 %
MONOCYTES NFR BLD: 9.8 %
NEUTROPHILS # BLD AUTO: 4.7 K/UL
NEUTROPHILS # BLD AUTO: 4.9 K/UL
NEUTROPHILS NFR BLD: 66.6 %
NEUTROPHILS NFR BLD: 72.8 %
NONHDLC SERPL-MCNC: 99 MG/DL
NT-PROBNP: 68 PG/ML
OB PNL STL: POSITIVE
PLATELET # BLD AUTO: 123 K/UL
PLATELET # BLD AUTO: 131 K/UL
PMV BLD AUTO: 11.5 FL
PMV BLD AUTO: 12.2 FL
POTASSIUM SERPL-SCNC: 5.6 MMOL/L
PROT SERPL-MCNC: 8.2 G/DL
PROTHROMBIN TIME: 13.9 SEC
RBC # BLD AUTO: 4.27 M/UL
RBC # BLD AUTO: 4.89 M/UL
SATURATED IRON: 23 %
SODIUM SERPL-SCNC: 140 MMOL/L
TOTAL IRON BINDING CAPACITY: 260 UG/DL
TRANSFERRIN SERPL-MCNC: 176 MG/DL
TRIGL SERPL-MCNC: 68 MG/DL
TROPONIN I SERPL DL<=0.01 NG/ML-MCNC: 0.01 NG/ML
TROPONIN I SERPL DL<=0.01 NG/ML-MCNC: <0.012 NG/ML
VIT B12 SERPL-MCNC: 234 PG/ML
WBC # BLD AUTO: 6.73 K/UL
WBC # BLD AUTO: 7.05 K/UL

## 2017-10-29 PROCEDURE — 85610 PROTHROMBIN TIME: CPT

## 2017-10-29 PROCEDURE — 80061 LIPID PANEL: CPT

## 2017-10-29 PROCEDURE — 94644 CONT INHLJ TX 1ST HOUR: CPT

## 2017-10-29 PROCEDURE — 82272 OCCULT BLD FECES 1-3 TESTS: CPT

## 2017-10-29 PROCEDURE — 85025 COMPLETE CBC W/AUTO DIFF WBC: CPT | Mod: 91

## 2017-10-29 PROCEDURE — C9113 INJ PANTOPRAZOLE SODIUM, VIA: HCPCS | Performed by: EMERGENCY MEDICINE

## 2017-10-29 PROCEDURE — 63600175 PHARM REV CODE 636 W HCPCS: Performed by: STUDENT IN AN ORGANIZED HEALTH CARE EDUCATION/TRAINING PROGRAM

## 2017-10-29 PROCEDURE — 63600175 PHARM REV CODE 636 W HCPCS: Performed by: INTERNAL MEDICINE

## 2017-10-29 PROCEDURE — C9113 INJ PANTOPRAZOLE SODIUM, VIA: HCPCS | Performed by: INTERNAL MEDICINE

## 2017-10-29 PROCEDURE — 93005 ELECTROCARDIOGRAM TRACING: CPT

## 2017-10-29 PROCEDURE — 93010 ELECTROCARDIOGRAM REPORT: CPT | Mod: ,,, | Performed by: INTERNAL MEDICINE

## 2017-10-29 PROCEDURE — 25000003 PHARM REV CODE 250: Performed by: INTERNAL MEDICINE

## 2017-10-29 PROCEDURE — 99291 CRITICAL CARE FIRST HOUR: CPT | Mod: 25

## 2017-10-29 PROCEDURE — 82728 ASSAY OF FERRITIN: CPT

## 2017-10-29 PROCEDURE — 82248 BILIRUBIN DIRECT: CPT

## 2017-10-29 PROCEDURE — 25000003 PHARM REV CODE 250: Performed by: STUDENT IN AN ORGANIZED HEALTH CARE EDUCATION/TRAINING PROGRAM

## 2017-10-29 PROCEDURE — G0378 HOSPITAL OBSERVATION PER HR: HCPCS

## 2017-10-29 PROCEDURE — 83540 ASSAY OF IRON: CPT

## 2017-10-29 PROCEDURE — 25000003 PHARM REV CODE 250: Performed by: EMERGENCY MEDICINE

## 2017-10-29 PROCEDURE — A4216 STERILE WATER/SALINE, 10 ML: HCPCS | Performed by: INTERNAL MEDICINE

## 2017-10-29 PROCEDURE — 85025 COMPLETE CBC W/AUTO DIFF WBC: CPT

## 2017-10-29 PROCEDURE — 82746 ASSAY OF FOLIC ACID SERUM: CPT

## 2017-10-29 PROCEDURE — 96375 TX/PRO/DX INJ NEW DRUG ADDON: CPT

## 2017-10-29 PROCEDURE — 83036 HEMOGLOBIN GLYCOSYLATED A1C: CPT

## 2017-10-29 PROCEDURE — 93010 ELECTROCARDIOGRAM REPORT: CPT | Mod: 76,,, | Performed by: INTERNAL MEDICINE

## 2017-10-29 PROCEDURE — 96365 THER/PROPH/DIAG IV INF INIT: CPT

## 2017-10-29 PROCEDURE — 36415 COLL VENOUS BLD VENIPUNCTURE: CPT

## 2017-10-29 PROCEDURE — 63600175 PHARM REV CODE 636 W HCPCS: Performed by: EMERGENCY MEDICINE

## 2017-10-29 PROCEDURE — 80053 COMPREHEN METABOLIC PANEL: CPT

## 2017-10-29 PROCEDURE — 25000242 PHARM REV CODE 250 ALT 637 W/ HCPCS: Performed by: INTERNAL MEDICINE

## 2017-10-29 PROCEDURE — 96366 THER/PROPH/DIAG IV INF ADDON: CPT

## 2017-10-29 PROCEDURE — 84484 ASSAY OF TROPONIN QUANT: CPT | Mod: 91

## 2017-10-29 PROCEDURE — 82607 VITAMIN B-12: CPT

## 2017-10-29 PROCEDURE — 83880 ASSAY OF NATRIURETIC PEPTIDE: CPT

## 2017-10-29 RX ORDER — ALPRAZOLAM 1 MG/1
1 TABLET ORAL 4 TIMES DAILY PRN
Status: DISCONTINUED | OUTPATIENT
Start: 2017-10-29 | End: 2017-10-31 | Stop reason: HOSPADM

## 2017-10-29 RX ORDER — LOTEPREDNOL ETABONATE 5 MG/G
GEL OPHTHALMIC DAILY
Status: DISCONTINUED | OUTPATIENT
Start: 2017-10-30 | End: 2017-10-31 | Stop reason: HOSPADM

## 2017-10-29 RX ORDER — LOTEPREDNOL ETABONATE 5 MG/ML
1 SUSPENSION/ DROPS OPHTHALMIC 4 TIMES DAILY
Status: DISCONTINUED | OUTPATIENT
Start: 2017-10-30 | End: 2017-10-30

## 2017-10-29 RX ORDER — PANTOPRAZOLE SODIUM 40 MG/10ML
40 INJECTION, POWDER, LYOPHILIZED, FOR SOLUTION INTRAVENOUS 2 TIMES DAILY
Status: DISCONTINUED | OUTPATIENT
Start: 2017-10-29 | End: 2017-10-30

## 2017-10-29 RX ORDER — SODIUM CHLORIDE 9 MG/ML
INJECTION, SOLUTION INTRAVENOUS CONTINUOUS
Status: ACTIVE | OUTPATIENT
Start: 2017-10-29 | End: 2017-10-30

## 2017-10-29 RX ORDER — SODIUM CHLORIDE 0.9 % (FLUSH) 0.9 %
3 SYRINGE (ML) INJECTION EVERY 8 HOURS
Status: DISCONTINUED | OUTPATIENT
Start: 2017-10-29 | End: 2017-10-31 | Stop reason: HOSPADM

## 2017-10-29 RX ORDER — PRAVASTATIN SODIUM 20 MG/1
80 TABLET ORAL DAILY
Status: DISCONTINUED | OUTPATIENT
Start: 2017-10-29 | End: 2017-10-31 | Stop reason: HOSPADM

## 2017-10-29 RX ORDER — THIAMINE HCL 100 MG
100 TABLET ORAL DAILY
Status: DISCONTINUED | OUTPATIENT
Start: 2017-10-30 | End: 2017-10-31 | Stop reason: HOSPADM

## 2017-10-29 RX ORDER — FOLIC ACID 1 MG/1
1 TABLET ORAL DAILY
Status: DISCONTINUED | OUTPATIENT
Start: 2017-10-30 | End: 2017-10-31 | Stop reason: HOSPADM

## 2017-10-29 RX ORDER — INSULIN ASPART 100 [IU]/ML
10 INJECTION, SOLUTION INTRAVENOUS; SUBCUTANEOUS ONCE
Status: DISCONTINUED | OUTPATIENT
Start: 2017-10-29 | End: 2017-10-29

## 2017-10-29 RX ORDER — TAMSULOSIN HYDROCHLORIDE 0.4 MG/1
1 CAPSULE ORAL NIGHTLY
Status: DISCONTINUED | OUTPATIENT
Start: 2017-10-29 | End: 2017-10-31 | Stop reason: HOSPADM

## 2017-10-29 RX ORDER — PANTOPRAZOLE SODIUM 40 MG/10ML
80 INJECTION, POWDER, LYOPHILIZED, FOR SOLUTION INTRAVENOUS
Status: COMPLETED | OUTPATIENT
Start: 2017-10-29 | End: 2017-10-29

## 2017-10-29 RX ORDER — FINASTERIDE 5 MG/1
5 TABLET, FILM COATED ORAL DAILY
Status: DISCONTINUED | OUTPATIENT
Start: 2017-10-30 | End: 2017-10-31 | Stop reason: HOSPADM

## 2017-10-29 RX ORDER — ALBUTEROL SULFATE 0.83 MG/ML
15 SOLUTION RESPIRATORY (INHALATION) ONCE
Status: COMPLETED | OUTPATIENT
Start: 2017-10-29 | End: 2017-10-29

## 2017-10-29 RX ORDER — RAMELTEON 8 MG/1
8 TABLET ORAL NIGHTLY PRN
Status: DISCONTINUED | OUTPATIENT
Start: 2017-10-29 | End: 2017-10-31 | Stop reason: HOSPADM

## 2017-10-29 RX ORDER — PANTOPRAZOLE SODIUM 40 MG/10ML
40 INJECTION, POWDER, LYOPHILIZED, FOR SOLUTION INTRAVENOUS DAILY
Status: DISCONTINUED | OUTPATIENT
Start: 2017-10-30 | End: 2017-10-29

## 2017-10-29 RX ADMIN — INSULIN HUMAN 10 UNITS: 100 INJECTION, SOLUTION PARENTERAL at 09:10

## 2017-10-29 RX ADMIN — SODIUM CHLORIDE: 0.9 INJECTION, SOLUTION INTRAVENOUS at 09:10

## 2017-10-29 RX ADMIN — TAMSULOSIN HYDROCHLORIDE 0.4 MG: 0.4 CAPSULE ORAL at 08:10

## 2017-10-29 RX ADMIN — PANTOPRAZOLE SODIUM 40 MG: 40 INJECTION, POWDER, FOR SOLUTION INTRAVENOUS at 09:10

## 2017-10-29 RX ADMIN — PANTOPRAZOLE SODIUM 80 MG: 40 INJECTION, POWDER, FOR SOLUTION INTRAVENOUS at 02:10

## 2017-10-29 RX ADMIN — SODIUM CHLORIDE, PRESERVATIVE FREE 3 ML: 5 INJECTION INTRAVENOUS at 09:10

## 2017-10-29 RX ADMIN — PRAVASTATIN SODIUM 80 MG: 40 TABLET ORAL at 08:10

## 2017-10-29 RX ADMIN — DEXTROSE 8 MG/HR: 50 INJECTION, SOLUTION INTRAVENOUS at 03:10

## 2017-10-29 RX ADMIN — ALBUTEROL SULFATE 15 MG: 2.5 SOLUTION RESPIRATORY (INHALATION) at 09:10

## 2017-10-29 RX ADMIN — RAMELTEON 8 MG: 8 TABLET, FILM COATED ORAL at 10:10

## 2017-10-29 RX ADMIN — OCTREOTIDE ACETATE 50 MCG/HR: 500 INJECTION, SOLUTION INTRAVENOUS; SUBCUTANEOUS at 08:10

## 2017-10-29 RX ADMIN — DEXTROSE MONOHYDRATE 25 G: 25 INJECTION, SOLUTION INTRAVENOUS at 08:10

## 2017-10-29 RX ADMIN — CALCIUM GLUCONATE 1000 MG: 94 INJECTION, SOLUTION INTRAVENOUS at 08:10

## 2017-10-29 RX ADMIN — CEFTRIAXONE 2 G: 2 INJECTION, SOLUTION INTRAVENOUS at 08:10

## 2017-10-29 NOTE — ED NOTES
Still on hold. Davida, , says staff is settling in another pt. I let Davida know that [facility] is here now to transport pt

## 2017-10-29 NOTE — ED NOTES
[facility] contacted for transport [facility] accepted per VARINDER Bullard ([facility] Hospitalist), dispatch was advised a STAT transferred was needed. [facility] is already in ED for transfer

## 2017-10-29 NOTE — ED NOTES
VARINDER Bullard MD with [facility] paged and speaking with TAINA Lockwood MD

## 2017-10-29 NOTE — ED PROVIDER NOTES
Encounter Date: 10/29/2017       History     Chief Complaint   Patient presents with    Rectal Bleeding     dark black bloody stools since Thursday. He denies pain or feeling weak      Pt is an 86 yo male with h/o HTN, and ETOH abuse is brought to the ED by his daughter for dark maroon colored bowl movements and weakness. Symptoms occurred Thursday and multiple times today. 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 tavarez    RETINAL DETACHMENT SURGERY  3/26/13    Right eye    TONSILLECTOMY       Family History   Problem Relation Age of Onset    Cancer Mother     Diabetes Mother     Hypertension Father     Stroke Father     Cancer Sister     Cancer Brother     Cataracts Paternal Grandmother     Glaucoma Paternal Grandmother     Hypertension Paternal Grandmother     Amblyopia Neg Hx     Blindness Neg Hx     Macular degeneration Neg Hx     Retinal detachment Neg Hx     Strabismus Neg Hx     Thyroid disease Neg Hx      Social History   Substance Use Topics    Smoking status: Former Smoker    Smokeless tobacco: Never Used    Alcohol use 0.5 oz/week     1 Standard drinks or  equivalent per week      Comment: soically     Review of Systems   Constitutional: Positive for fatigue. Negative for fever.   HENT: Negative for sore throat.    Respiratory: Negative for chest tightness, shortness of breath, wheezing and stridor.    Cardiovascular: Negative for chest pain.   Gastrointestinal: Positive for blood in stool. Negative for abdominal distention, abdominal pain, constipation, diarrhea, nausea, rectal pain and vomiting.   Genitourinary: Negative for dysuria.   Musculoskeletal: Negative for back pain.   Skin: Negative for rash.   Neurological: Negative for weakness.   Hematological: Does not bruise/bleed easily.   All other systems reviewed and are negative.      Physical Exam     Initial Vitals [10/29/17 1348]   BP Pulse Resp Temp SpO2   123/75 106 18 97.9 °F (36.6 °C) 96 %      MAP       91         Physical Exam    Nursing note and vitals reviewed.  Constitutional: He appears well-developed and well-nourished. He appears distressed (mild).   HENT:   Head: Normocephalic and atraumatic.   Eyes: EOM are normal. Pupils are equal, round, and reactive to light.   Conjunctiva pale   Cardiovascular: Normal rate and regular rhythm.   Murmur heard.  Pulmonary/Chest: Breath sounds normal. No respiratory distress. He has no wheezes. He has no rhonchi. He has no rales. He exhibits no tenderness.   Abdominal: Soft. He exhibits no distension and no mass. There is no tenderness. There is no rebound and no guarding.   Dark blood per rectum    Musculoskeletal: He exhibits no edema or tenderness.   Neurological: He is alert and oriented to person, place, and time. No cranial nerve deficit.   Skin: Skin is warm and dry.         ED Course   Critical Care  Date/Time: 10/29/2017 4:16 PM  Performed by: ELIGIO TAVERAS  Authorized by: ELIGIO TAVERAS   Direct patient critical care time: 30 minutes  Additional history critical care time: 10 minutes  Ordering / reviewing critical care time: 5  minutes  Documentation critical care time: 10 minutes  Consulting other physicians critical care time: 5 minutes  Consult with family critical care time: 5 minutes  Total critical care time (exclusive of procedural time) : 65 minutes  Critical care time was exclusive of separately billable procedures and treating other patients and teaching time.  Critical care was necessary to treat or prevent imminent or life-threatening deterioration of the following conditions: GI bleed.  Critical care was time spent personally by me on the following activities: discussions with consultants, evaluation of patient's response to treatment, examination of patient, development of treatment plan with patient or surrogate, ordering and performing treatments and interventions, obtaining history from patient or surrogate, ordering and review of laboratory studies, review of old charts, pulse oximetry, ordering and review of radiographic studies and re-evaluation of patient's condition.        Labs Reviewed   CBC W/ AUTO DIFFERENTIAL - Abnormal; Notable for the following:        Result Value    Platelets 131 (*)     Lymph% 16.6 (*)     All other components within normal limits   COMPREHENSIVE METABOLIC PANEL   TROPONIN I   OCCULT BLOOD X 1, STOOL   NT-PRO NATRIURETIC PEPTIDE                          Attending Attestation:             Attending ED Notes:   Consulted Dr. Savage who is on for GI. He agrees with serial h/h, protonix gtt, admit to medicine and prob EGD in am          ED Course      Clinical Impression:   The encounter diagnosis was Gastrointestinal hemorrhage, unspecified gastrointestinal hemorrhage type.                           Jeremie Lockwood MD  10/29/17 1883

## 2017-10-29 NOTE — CONSULTS
[facility]    CC: melena    HPI 87 y.o. male transferred from [facility] on 10/29/2017 for acute recurrent progressively more voluminous melena that filled up a depends diaper today. He reportedly had melena on Thursday that ceased until today. He had 3 large bowel movements filling his depends that was mixed with red and black stool w/o clots. He denies lightheadedness, cp, sob. He takes no NSAIDS but drinks 2 beers per night. He was a heavy drinker of liquor prior to August. He recently had a cold and took some cold medicine. He also takes a bowel stimulant nightly.     He is a patient of Dr. Zee with his last visit in 1/2017 for dark stools and improved VICK. His last EGD was 12/3/16 where he had a gastritis and a small hiatal hernia with no varices. Colonoscopy was 9/4/14 with findings of 2 small polyps.     Past Medical History   has a past medical history of Benign essential hypertension; Borderline glaucoma, open angle with borderline findings (10/19/2012); BPH (benign prostatic hypertrophy); Erectile dysfunction; GERD (gastroesophageal reflux disease); Hyperlipidemia; Iron deficiency anemia (12/13/2016); and Nuclear sclerosis (10/19/2012).    Past Surgical History   has a past surgical history that includes Tonsillectomy; lump removal; lump in back removed (2009); Cataract extraction w/  intraocular lens implant (12/5/12); Pars plana vitrectomy (12/11/12); and Retinal detachment surgery (3/26/13).    Social History  Social History   Substance Use Topics    Smoking status: Former Smoker    Smokeless tobacco: Never Used    Alcohol use 0.5 oz/week     1 Standard drinks or equivalent per week      Comment: soically       Family History  Family History   Problem Relation Age of Onset    Cancer Mother     Diabetes Mother     Hypertension Father     Stroke Father     Cancer Sister     Cancer Brother     Cataracts Paternal Grandmother     Glaucoma Paternal Grandmother     Hypertension  "Paternal Grandmother     Amblyopia Neg Hx     Blindness Neg Hx     Macular degeneration Neg Hx     Retinal detachment Neg Hx     Strabismus Neg Hx     Thyroid disease Neg Hx        Review of Systems  General ROS: negative for chills, fever or weight loss  Psychological ROS: negative for hallucination, depression or suicidal ideation  Ophthalmic ROS: negative for blurry vision, photophobia or eye pain  ENT ROS: negative for epistaxis, sore throat or rhinorrhea  Respiratory ROS: no cough, shortness of breath, or wheezing  Cardiovascular ROS: no chest pain or dyspnea on exertion  Gastrointestinal ROS: melena. No abd pain or diarrhea. He had constipation prior to today.   Genito-Urinary ROS: no dysuria, trouble voiding, or hematuria  Musculoskeletal ROS: negative for gait disturbance or muscular weakness  Neurological ROS: no syncope or seizures; no ataxia  Dermatological ROS: negative for pruritis, rash and jaundice    Physical Examination  /79   Pulse 78   Temp 97.9 °F (36.6 °C) (Oral)   Resp 15   Ht 5' 9" (1.753 m)   Wt 72.6 kg (160 lb)   SpO2 96%   BMI 23.63 kg/m²   General appearance: alert, cooperative, no distress  HENT: Normocephalic, atraumatic, neck symmetrical, no nasal discharge   Eyes: conjunctivae/corneas clear, PERRL, EOM's intact  Lungs: clear to auscultation in all fields, no dullness to percussion bilaterally, no wheeze  Heart: normal rate, regular rhythm without rub; palpable peripheral pulses  Abdomen: soft, flat, nontender, nondisteneded + BS  Rectal: bright red blood, no melena or masses   Extremities: extremities symmetric; no clubbing, cyanosis, or edema  Integument: Skin color, texture, turgor normal; no rashes; hair distrubution normal  Neurologic: Alert and oriented X 3, normal strength, normal coordination  Psychiatric: no pressured speech; normal affect; no evidence of impaired cognition     Labs:  Hb 14.7  Plt 131  INR 1.3  AST 44  Bili 1.6      Imaging:    Portable CXR: "   Irregularity in the left heart border with hilar fullness and possible left apical opacity.       I have personally reviewed these images    Assessment:   87 y.o. male h/o gastritis and VICK who p/w progressive melena witnessed in the ED with higher than expected Hb of 14. Hemodynamically stable. There is concern for underlying liver disease given thrombocytopenia, AST elevation, APRI 0.75, liver cyst on ultrasound (2016), and INR 1.3. EGD in 2016 w/o varices.      Plan:  - PPI and octreotide gtt  - Rocephin for ppx   - US of abd to evaluate for ascites + paracentesis if free fluid  - 2 large bore IV   - Trend CBC   - NPO at midnight for EGD tomorrow  - Notify GI on call for changes in patient condition  - Repeat CXR    Discussed with Dr. Cassy Savage   [facility] Fellow, PGYIV  [phone]

## 2017-10-30 ENCOUNTER — ANESTHESIA EVENT (OUTPATIENT)
Dept: ENDOSCOPY | Facility: HOSPITAL | Age: 82
DRG: 378 | End: 2017-10-30
Payer: MEDICARE

## 2017-10-30 ENCOUNTER — SURGERY (OUTPATIENT)
Age: 82
End: 2017-10-30

## 2017-10-30 ENCOUNTER — ANESTHESIA (OUTPATIENT)
Dept: ENDOSCOPY | Facility: HOSPITAL | Age: 82
DRG: 378 | End: 2017-10-30
Payer: MEDICARE

## 2017-10-30 PROBLEM — K92.1 GASTROINTESTINAL HEMORRHAGE WITH MELENA: Status: ACTIVE | Noted: 2017-10-29

## 2017-10-30 PROBLEM — D51.9 VITAMIN B12 DEFICIENCY ANEMIA: Status: ACTIVE | Noted: 2017-10-30

## 2017-10-30 PROBLEM — K29.80 DUODENITIS WITHOUT BLEEDING: Status: ACTIVE | Noted: 2017-10-30

## 2017-10-30 LAB
ALBUMIN SERPL BCP-MCNC: 2.7 G/DL
ALP SERPL-CCNC: 52 U/L
ALT SERPL W/O P-5'-P-CCNC: 8 U/L
ANION GAP SERPL CALC-SCNC: 6 MMOL/L
AST SERPL-CCNC: 13 U/L
BASOPHILS # BLD AUTO: 0.02 K/UL
BASOPHILS NFR BLD: 0.3 %
BILIRUB SERPL-MCNC: 0.4 MG/DL
BUN SERPL-MCNC: 18 MG/DL
CALCIUM SERPL-MCNC: 8.1 MG/DL
CHLORIDE SERPL-SCNC: 109 MMOL/L
CO2 SERPL-SCNC: 24 MMOL/L
CREAT SERPL-MCNC: 1.4 MG/DL
DIFFERENTIAL METHOD: ABNORMAL
EOSINOPHIL # BLD AUTO: 0.1 K/UL
EOSINOPHIL NFR BLD: 0.8 %
ERYTHROCYTE [DISTWIDTH] IN BLOOD BY AUTOMATED COUNT: 14.1 %
EST. GFR  (AFRICAN AMERICAN): 52 ML/MIN/1.73 M^2
EST. GFR  (NON AFRICAN AMERICAN): 45 ML/MIN/1.73 M^2
ESTIMATED AVG GLUCOSE: 103 MG/DL
GLUCOSE SERPL-MCNC: 192 MG/DL
HBA1C MFR BLD HPLC: 5.2 %
HCT VFR BLD AUTO: 34.7 %
HGB BLD-MCNC: 10.9 G/DL
LYMPHOCYTES # BLD AUTO: 1.2 K/UL
LYMPHOCYTES NFR BLD: 15.6 %
MCH RBC QN AUTO: 29.3 PG
MCHC RBC AUTO-ENTMCNC: 31.4 G/DL
MCV RBC AUTO: 93 FL
MONOCYTES # BLD AUTO: 0.6 K/UL
MONOCYTES NFR BLD: 7.6 %
NEUTROPHILS # BLD AUTO: 5.8 K/UL
NEUTROPHILS NFR BLD: 75.4 %
PLATELET # BLD AUTO: 117 K/UL
PMV BLD AUTO: 12 FL
POTASSIUM SERPL-SCNC: 4.7 MMOL/L
PROT SERPL-MCNC: 5.7 G/DL
RBC # BLD AUTO: 3.72 M/UL
SODIUM SERPL-SCNC: 139 MMOL/L
WBC # BLD AUTO: 7.67 K/UL

## 2017-10-30 PROCEDURE — A4216 STERILE WATER/SALINE, 10 ML: HCPCS | Performed by: INTERNAL MEDICINE

## 2017-10-30 PROCEDURE — C9113 INJ PANTOPRAZOLE SODIUM, VIA: HCPCS | Performed by: INTERNAL MEDICINE

## 2017-10-30 PROCEDURE — 63600175 PHARM REV CODE 636 W HCPCS: Performed by: NURSE ANESTHETIST, CERTIFIED REGISTERED

## 2017-10-30 PROCEDURE — 85025 COMPLETE CBC W/AUTO DIFF WBC: CPT

## 2017-10-30 PROCEDURE — 94761 N-INVAS EAR/PLS OXIMETRY MLT: CPT

## 2017-10-30 PROCEDURE — 36415 COLL VENOUS BLD VENIPUNCTURE: CPT

## 2017-10-30 PROCEDURE — 63700000 PHARM REV CODE 250 ALT 637 W/O HCPCS: Performed by: INTERNAL MEDICINE

## 2017-10-30 PROCEDURE — 25000003 PHARM REV CODE 250: Performed by: NURSE ANESTHETIST, CERTIFIED REGISTERED

## 2017-10-30 PROCEDURE — 37000009 HC ANESTHESIA EA ADD 15 MINS: Performed by: INTERNAL MEDICINE

## 2017-10-30 PROCEDURE — 43235 EGD DIAGNOSTIC BRUSH WASH: CPT | Performed by: INTERNAL MEDICINE

## 2017-10-30 PROCEDURE — 11000001 HC ACUTE MED/SURG PRIVATE ROOM

## 2017-10-30 PROCEDURE — 25000003 PHARM REV CODE 250: Performed by: INTERNAL MEDICINE

## 2017-10-30 PROCEDURE — 37000008 HC ANESTHESIA 1ST 15 MINUTES: Performed by: INTERNAL MEDICINE

## 2017-10-30 PROCEDURE — 63600175 PHARM REV CODE 636 W HCPCS: Performed by: INTERNAL MEDICINE

## 2017-10-30 PROCEDURE — 0DJ08ZZ INSPECTION OF UPPER INTESTINAL TRACT, VIA NATURAL OR ARTIFICIAL OPENING ENDOSCOPIC: ICD-10-PCS | Performed by: INTERNAL MEDICINE

## 2017-10-30 PROCEDURE — 80053 COMPREHEN METABOLIC PANEL: CPT

## 2017-10-30 RX ORDER — POLYETHYLENE GLYCOL 3350, SODIUM SULFATE ANHYDROUS, SODIUM BICARBONATE, SODIUM CHLORIDE, POTASSIUM CHLORIDE 236; 22.74; 6.74; 5.86; 2.97 G/4L; G/4L; G/4L; G/4L; G/4L
4000 POWDER, FOR SOLUTION ORAL ONCE
Status: COMPLETED | OUTPATIENT
Start: 2017-10-30 | End: 2017-10-30

## 2017-10-30 RX ORDER — FOLIC ACID 1 MG/1
1 TABLET ORAL DAILY
Qty: 30 TABLET | Refills: 3 | Status: SHIPPED | OUTPATIENT
Start: 2017-10-30 | End: 2017-10-31

## 2017-10-30 RX ORDER — SODIUM CHLORIDE 9 MG/ML
INJECTION, SOLUTION INTRAVENOUS CONTINUOUS PRN
Status: DISCONTINUED | OUTPATIENT
Start: 2017-10-30 | End: 2017-10-30

## 2017-10-30 RX ORDER — DILTIAZEM HYDROCHLORIDE 240 MG/1
240 CAPSULE, COATED, EXTENDED RELEASE ORAL DAILY
Qty: 90 CAPSULE | Refills: 3 | Status: SHIPPED | OUTPATIENT
Start: 2017-10-30 | End: 2017-10-31

## 2017-10-30 RX ORDER — PROPOFOL 10 MG/ML
VIAL (ML) INTRAVENOUS CONTINUOUS PRN
Status: DISCONTINUED | OUTPATIENT
Start: 2017-10-30 | End: 2017-10-30

## 2017-10-30 RX ORDER — PANTOPRAZOLE SODIUM 40 MG/1
40 TABLET, DELAYED RELEASE ORAL DAILY
Status: DISCONTINUED | OUTPATIENT
Start: 2017-10-31 | End: 2017-10-31 | Stop reason: HOSPADM

## 2017-10-30 RX ORDER — HYDROMORPHONE HYDROCHLORIDE 2 MG/ML
0.5 INJECTION, SOLUTION INTRAMUSCULAR; INTRAVENOUS; SUBCUTANEOUS EVERY 5 MIN PRN
Status: CANCELLED | OUTPATIENT
Start: 2017-10-30

## 2017-10-30 RX ORDER — CYANOCOBALAMIN 1000 UG/ML
1000 INJECTION, SOLUTION INTRAMUSCULAR; SUBCUTANEOUS DAILY
Status: DISCONTINUED | OUTPATIENT
Start: 2017-10-30 | End: 2017-10-31 | Stop reason: HOSPADM

## 2017-10-30 RX ORDER — ONDANSETRON 2 MG/ML
4 INJECTION INTRAMUSCULAR; INTRAVENOUS ONCE AS NEEDED
Status: CANCELLED | OUTPATIENT
Start: 2017-10-30 | End: 2017-10-30

## 2017-10-30 RX ORDER — SODIUM CHLORIDE 0.9 % (FLUSH) 0.9 %
3 SYRINGE (ML) INJECTION
Status: CANCELLED | OUTPATIENT
Start: 2017-10-30

## 2017-10-30 RX ORDER — LIDOCAINE HCL/PF 100 MG/5ML
SYRINGE (ML) INTRAVENOUS
Status: DISCONTINUED | OUTPATIENT
Start: 2017-10-30 | End: 2017-10-30

## 2017-10-30 RX ORDER — PROPOFOL 10 MG/ML
VIAL (ML) INTRAVENOUS
Status: DISCONTINUED | OUTPATIENT
Start: 2017-10-30 | End: 2017-10-30

## 2017-10-30 RX ORDER — SODIUM CHLORIDE 0.9 % (FLUSH) 0.9 %
3 SYRINGE (ML) INJECTION EVERY 8 HOURS
Status: CANCELLED | OUTPATIENT
Start: 2017-10-30

## 2017-10-30 RX ORDER — LANOLIN ALCOHOL/MO/W.PET/CERES
100 CREAM (GRAM) TOPICAL DAILY
COMMUNITY
Start: 2017-10-30 | End: 2017-10-31

## 2017-10-30 RX ORDER — LOTEPREDNOL ETABONATE 5 MG/ML
1 SUSPENSION/ DROPS OPHTHALMIC EVERY MORNING
Status: DISCONTINUED | OUTPATIENT
Start: 2017-10-31 | End: 2017-10-31 | Stop reason: HOSPADM

## 2017-10-30 RX ADMIN — CYANOCOBALAMIN 1000 MCG: 1000 INJECTION, SOLUTION INTRAMUSCULAR; SUBCUTANEOUS at 12:10

## 2017-10-30 RX ADMIN — TAMSULOSIN HYDROCHLORIDE 0.4 MG: 0.4 CAPSULE ORAL at 08:10

## 2017-10-30 RX ADMIN — FOLIC ACID 1 MG: 1 TABLET ORAL at 12:10

## 2017-10-30 RX ADMIN — LIDOCAINE HYDROCHLORIDE 80 MG: 20 INJECTION, SOLUTION INTRAVENOUS at 10:10

## 2017-10-30 RX ADMIN — SODIUM CHLORIDE: 0.9 INJECTION, SOLUTION INTRAVENOUS at 10:10

## 2017-10-30 RX ADMIN — PROPOFOL 30 MG: 10 INJECTION, EMULSION INTRAVENOUS at 10:10

## 2017-10-30 RX ADMIN — SODIUM CHLORIDE, PRESERVATIVE FREE 3 ML: 5 INJECTION INTRAVENOUS at 12:10

## 2017-10-30 RX ADMIN — SODIUM CHLORIDE, PRESERVATIVE FREE 3 ML: 5 INJECTION INTRAVENOUS at 05:10

## 2017-10-30 RX ADMIN — THERA TABS 1 TABLET: TAB at 12:10

## 2017-10-30 RX ADMIN — FINASTERIDE 5 MG: 5 TABLET, FILM COATED ORAL at 12:10

## 2017-10-30 RX ADMIN — PROPOFOL 50 MG: 10 INJECTION, EMULSION INTRAVENOUS at 10:10

## 2017-10-30 RX ADMIN — POLYETHYLENE GLYCOL 3350, SODIUM SULFATE ANHYDROUS, SODIUM BICARBONATE, SODIUM CHLORIDE, POTASSIUM CHLORIDE 4000 ML: 236; 22.74; 6.74; 5.86; 2.97 POWDER, FOR SOLUTION ORAL at 08:10

## 2017-10-30 RX ADMIN — PRAVASTATIN SODIUM 80 MG: 40 TABLET ORAL at 12:10

## 2017-10-30 RX ADMIN — LOTEPREDNOL ETABONATE 1 DROP: 5 SUSPENSION/ DROPS OPHTHALMIC at 06:10

## 2017-10-30 RX ADMIN — OCTREOTIDE ACETATE 50 MCG/HR: 500 INJECTION, SOLUTION INTRAVENOUS; SUBCUTANEOUS at 05:10

## 2017-10-30 RX ADMIN — Medication 100 MG: at 12:10

## 2017-10-30 RX ADMIN — PANTOPRAZOLE SODIUM 40 MG: 40 INJECTION, POWDER, FOR SOLUTION INTRAVENOUS at 12:10

## 2017-10-30 NOTE — PROGRESS NOTES
Transferred to ENDO for EGD by CRNA & RN, no s/s of distress, no c/o pain/discomfort, safety maintained   [phone]

## 2017-10-30 NOTE — ANESTHESIA PREPROCEDURE EVALUATION
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 tavarez    RETINAL DETACHMENT SURGERY  3/26/13    Right eye    TONSILLECTOMY       Patient Active Problem List   Diagnosis    Hyperlipidemia    Benign essential hypertension    GERD (gastroesophageal reflux disease)    Erectile dysfunction    Benign prostatic hyperplasia without lower urinary tract symptoms    Nuclear sclerosis    Horseshoe tear of retina    History of retinal detachment    History of GI bleed    Iron deficiency anemia    Thrombocytopenia    Vitamin D deficiency    Gastrointestinal hemorrhage    Acute blood loss anemia    Acute upper GI bleed    Alcohol abuse    Hyperkalemia    Elevated bilirubin     Wt Readings from Last 3 Encounters:   10/29/17 69.5 kg (153 lb 3.5 oz)   07/31/17 72.7 kg (160 lb 4.4 oz)   01/09/17 70 kg (154 lb 5.2 oz)     Temp Readings from Last 3 Encounters:   10/30/17 36.6 °C (97.9 °F) (Oral)   01/06/17 36.9 °C (98.4 °F) (Oral)   12/13/16 36.8 °C (98.2 °F) (Oral)     BP  Readings from Last 3 Encounters:   10/30/17 (!) 127/58   07/31/17 (!) 144/92   01/09/17 (!) 169/72     Pulse Readings from Last 3 Encounters:   10/30/17 63   07/31/17 95   01/09/17 84       Anesthesia Evaluation    I have reviewed the Patient Summary Reports.        Review of Systems    Lab Results   Component Value Date    WBC 7.05 10/29/2017    HGB 12.6 (L) 10/29/2017    HCT 39.4 (L) 10/29/2017    MCV 92 10/29/2017     (L) 10/29/2017       Chemistry        Component Value Date/Time     10/29/2017 1448    K 5.6 (H) 10/29/2017 1448     10/29/2017 1448    CO2 23 10/29/2017 1448    BUN 14 10/29/2017 1448    CREATININE 1.27 10/29/2017 1448     (H) 10/29/2017 1448        Component Value Date/Time    CALCIUM 8.8 10/29/2017 1448    ALKPHOS 61 10/29/2017 1448    AST 44 10/29/2017 1448    ALT <6 (L) 10/29/2017 1448    BILITOT 1.6 (H) 10/29/2017 1448    ESTGFRAFRICA 58.3 (A) 10/29/2017 1448    EGFRNONAA 50.5 (A) 10/29/2017 1448        EKG:  Vent. Rate : 071 BPM     Atrial Rate : 071 BPM     P-R Int : 148 ms          QRS Dur : 082 ms      QT Int : 350 ms       P-R-T Axes : 051 023 042 degrees     QTc Int : 380 ms    Sinus rhythm with marked sinus arrythmia  Otherwise normal ECG  When compared with ECG of 12-SEP-2012 11:47,  No significant change was found  Confirmed by Joshua Hall MD ([id number]) on 12/12/2016 6:35:45 PM    Referred By: SELF REFERRAL           Confirmed By:Joshua Hall MD    Physical Exam  General:  Well nourished    Airway/Jaw/Neck:  Airway Findings: Mouth Opening: Normal Tongue: Normal  General Airway Assessment: Adult  Mallampati: II  Improves to II with phonation.  TM Distance: Normal, at least 6 cm       Chest/Lungs:  Chest/Lungs Findings: Clear to auscultation     Heart/Vascular:  Heart Findings: Rate: Normal  Rhythm: Regular Rhythm        Mental Status:  Mental Status Findings:  Cooperative         Anesthesia Plan  Type of Anesthesia, risks & benefits discussed:  Anesthesia Type:   MAC, general  Patient's Preference:   Intra-op Monitoring Plan:   Intra-op Monitoring Plan Comments:   Post Op Pain Control Plan:   Post Op Pain Control Plan Comments:   Induction:   IV  Beta Blocker:         Informed Consent: Patient understands risks and agrees with Anesthesia plan.  Questions answered. Anesthesia consent signed with patient.  ASA Score: 3     Day of Surgery Review of History & Physical: I have interviewed and examined the patient. I have reviewed the patient's H&P dated:  There are no significant changes.  H&P update referred to the provider.  H&P completed by Anesthesiologist.       Ready For Surgery From Anesthesia Perspective.

## 2017-10-30 NOTE — PLAN OF CARE
Problem: Patient Care Overview  Goal: Plan of Care Review  Outcome: Ongoing (interventions implemented as appropriate)  Patient is progressing toward goals. Pt had dark red with clots present bowel movement x 3. Denies pain. Bed in low position. Call light within reach. Remains free from fall/injury. Will continue to monitor.

## 2017-10-30 NOTE — TRANSFER OF CARE
"Anesthesia Transfer of Care Note    Patient: Toby Green    Procedure(s) Performed: Procedure(s) (LRB):  ESOPHAGOGASTRODUODENOSCOPY (EGD) (N/A)    Patient location: ICU    Anesthesia Type: MAC    Transport from OR: Transported from OR on room air with adequate spontaneous ventilation    Post pain: adequate analgesia    Post assessment: no apparent anesthetic complications and tolerated procedure well    Post vital signs: stable    Level of consciousness: alert, oriented and awake    Nausea/Vomiting: no nausea/vomiting    Complications: none    Transfer of care protocol was followed      Last vitals:   Visit Vitals  BP (!) 156/78   Pulse 75   Temp 36.4 °C (97.6 °F) (Oral)   Resp (!) 34   Ht 5' 9" (1.753 m)   Wt 69.5 kg (153 lb 3.5 oz)   SpO2 96%   BMI 22.63 kg/m²     "

## 2017-10-30 NOTE — PROGRESS NOTES
Transferred back to bed, coneccted back to monitor, drowsy opens eyes to voice, no s/s of distress, no c/o pain/discomfort, side rails up x's 3, bed low & locked, will continue to monitor

## 2017-10-30 NOTE — CONSULTS
Patient transported to endoscopy unit on portable monitor for EGD exam. AAOx3. No complaints. EGD and anesthesia consents verified.

## 2017-10-30 NOTE — PLAN OF CARE
Problem: Patient Care Overview  Goal: Plan of Care Review  Outcome: Ongoing (interventions implemented as appropriate)  Patient on room air; O2 saturation 95%. Will continue to monitor.

## 2017-10-30 NOTE — PLAN OF CARE
[place] met with pt   lives with wife Brooke   [phone]   independent, pt drives, no dme, no hh     pt can't recall wife or daughter's cell phone #'s     uses [facility] in [place] -- able to buy meds   for transfer to med surg unit tonight     brb per stool  - underwent EGD today;  for colonoscopy 10/31     transferred [facility] ER - GIB with melena          10/30/17 1803   Discharge Assessment   Assessment Type Discharge Planning Assessment   Confirmed/corrected address and phone number on facesheet? Yes   Assessment information obtained from? Patient;Medical Record   Expected Length of Stay (days) (3)   Communicated expected length of stay with patient/caregiver yes   Prior to hospitilization cognitive status: Alert/Oriented   Prior to hospitalization functional status: Independent   Current cognitive status: Alert/Oriented   Current Functional Status: Independent   Facility Arrived From: home    Lives With spouse  (wife Brooke  [phone] )   Able to Return to Prior Arrangements yes   Is patient able to care for self after discharge? Yes   Who are your caregiver(s) and their phone number(s)? (Mother and daughter Kerri   )   Patient's perception of discharge disposition home or selfcare   Readmission Within The Last 30 Days no previous admission in last 30 days   Patient currently being followed by outpatient case management? No   Patient currently receives any other outside agency services? No   Equipment Currently Used at Home none   Do you have any problems affording any of your prescribed medications? No  ([facility] [place] )   Is the patient taking medications as prescribed? yes   Does the patient have transportation home? Yes   Transportation Available family or friend will provide;car   Does the patient receive services at the [facility]? No   Discharge Plan A Home;Home with family   Discharge Plan B Home;Home with family;Home Health   Patient/Family In Agreement With Plan yes

## 2017-10-30 NOTE — H&P
"[facility] History and Physical - Resident Note    Admitting Team: Medicine Team A  Attending Physician: VARINDER Bullard  Resident: Leandro  Interns: Demond    Date of Admit: 10/29/2017    Chief Complaint     Rectal Bleeding for 3 days    Subjective:      History of Present Illness:  Toby Green is a 87 y.o. male who  has a past medical history of Benign essential hypertension; Borderline glaucoma, open angle with borderline findings (10/19/2012); BPH (benign prostatic hypertrophy); Erectile dysfunction; GERD (gastroesophageal reflux disease); Hyperlipidemia; Iron deficiency anemia (12/13/2016); and Nuclear sclerosis (10/19/2012).. The patient presented to [facility] on 10/29/2017 with a primary complaint of Rectal Bleeding (dark black bloody stools since Thursday. He denies pain or feeling weak )    Patient was in his USOH until Thursday, when he noted some bright red blood in his stool.  He didn't think anything of it at the time, since he had this previously, and he was asymptomatic.  He stated, he did not have any more bleeding on Friday, then Saturday had more blood in his stool, but stated he ate red gummies and drank a red drink, so he thought it was secondary to that.  He also endorsed having taken some tylenol and one BC powder on Saturday.  On the morning of presentation, he endorsed more frequent blood per rectum, without any stool.  Denied any melena. Denied hematemesis.  Denied any weakness/lightheadedness.  He stated that he used to drink "heavily," unable to quantify exactlyl how much, but stated that in August, he cut back to 3 beers/day.  Denies any other symptoms of chest pain, palpitations, SOB, nausea/vomiting. Denied any urinary symptoms. Has been taking all of his medications as prescribed.    Past Medical History:  Past Medical History:   Diagnosis Date    Benign essential hypertension     Borderline glaucoma, open angle with borderline findings 10/19/2012    " BPH (benign prostatic hypertrophy)     Erectile dysfunction     GERD (gastroesophageal reflux disease)     Hyperlipidemia     Iron deficiency anemia 12/13/2016    Nuclear sclerosis 10/19/2012       Past Surgical History:  Past Surgical History:   Procedure Laterality Date    CATARACT EXTRACTION W/  INTRAOCULAR LENS IMPLANT  12/5/12    OD w/     lump in back removed  2009    lump removal      head    PARS PLANA VITRECTOMY  12/11/12    OD w/ dr. tavarez    RETINAL DETACHMENT SURGERY  3/26/13    Right eye    TONSILLECTOMY         Allergies:  Review of patient's allergies indicates:   Allergen Reactions    Aleve  [naproxen sodium]      Other reaction(s): stomch bleeding    Naproxen      Other reaction(s): bleeding    Ticlid  [ticlopidine]      Other reaction(s): extreme bleeding       Home Medications:  Prior to Admission medications    Medication Sig Start Date End Date Taking? Authorizing Provider   finasteride (PROSCAR) 5 mg tablet TAKE 1 TABLET BY MOUTH EVERY DAY 4/8/17  Yes Lindsey Du MD   LOTEMAX 0.5 % DrpG INSTILL 1 DROP IN RIGHT EYE DAILY 12/19/16  Yes Historical Provider, MD   LOTEMAX 0.5 % ophthalmic suspension PLACE 1 DROP IN RIGHT EYE DAILY 5/31/16  Yes Historical Provider, MD   pravastatin (PRAVACHOL) 80 MG tablet TAKE 1 TABLET (80 MG TOTAL) BY MOUTH ONCE DAILY. 3/26/17  Yes Lindsey Du MD   tamsulosin (FLOMAX) 0.4 mg Cp24 TAKE 1 CAPSULE BY MOUTH NIGHTLY 3/14/17  Yes Lindsey Du MD   diltiaZEM (CARDIZEM CD) 240 MG 24 hr capsule TAKE 1 CAPSULE (240 MG TOTAL) BY MOUTH ONCE DAILY. 10/21/17 10/29/17 Yes Lindsey Du MD   aspirin (ECOTRIN) 81 MG EC tablet Take 81 mg by mouth once daily.    Historical Provider, MD   cyclobenzaprine (FLEXERIL) 5 MG tablet Take 5 mg by mouth 3 (three) times daily as needed. 1/15/17   Historical Provider, MD   omeprazole (PRILOSEC) 40 MG capsule Take 1 capsule (40 mg total) by mouth once daily. 2/8/17 2/8/18  Lindsey MCDONALD  "MD Ana Laura   trazodone (DESYREL) 50 MG tablet Take 1 tablet (50 mg total) by mouth nightly as needed for Insomnia. 17  Lindsey Du MD       Family History:  Family History   Problem Relation Age of Onset    Cancer Mother     Diabetes Mother     Hypertension Father     Stroke Father     Cancer Sister     Cancer Brother     Cataracts Paternal Grandmother     Glaucoma Paternal Grandmother     Hypertension Paternal Grandmother     Amblyopia Neg Hx     Blindness Neg Hx     Macular degeneration Neg Hx     Retinal detachment Neg Hx     Strabismus Neg Hx     Thyroid disease Neg Hx        Social History:  Social History   Substance Use Topics    Smoking status: Former Smoker    Smokeless tobacco: Never Used    Alcohol use 0.5 oz/week     2-3 drinks nightly             Review of Systems:  Pertinent positives and negatives are listed in HPI.  All other systems are reviewed and are negative.    Health Maintaince :   Primary Care Physician: Ana Laura  Immunizations:   TDap is not up to date  Influenza is not up to date  Pneumovax is up to date  Colonoscopy: is up to date.      Objective:   Last 24 Hour Vital Signs:  BP  Min: 97/65  Max: 136/79  Temp  Av.9 °F (36.6 °C)  Min: 97.9 °F (36.6 °C)  Max: 97.9 °F (36.6 °C)  Pulse  Av.2  Min: 78  Max: 106  Resp  Av.6  Min: 12  Max: 24  SpO2  Av %  Min: 96 %  Max: 98 %  Height  Av' 9" (175.3 cm)  Min: 5' 9" (175.3 cm)  Max: 5' 9" (175.3 cm)  Weight  Av.6 kg (160 lb)  Min: 72.6 kg (160 lb)  Max: 72.6 kg (160 lb)  Body mass index is 23.63 kg/m².  No intake/output data recorded.    Physical Examination:  General: Alert and awake in NAD  Head:  Normocephalic and atraumatic  Eyes:  PERRL; EOMi with anicteric sclera and clear conjunctivae  Mouth:  Oropharynx clear and without exudate; moist mucous membranes  Cardio:  Regular rate and rhythm with normal S1 and S2; no murmurs or rubs  Resp:  CTAB and unlabored; no wheezes, " crackles or rhonchi  Abdom: Soft, NTND with normoactive bowel sounds  Extrem: WWP with no clubbing, cyanosis or edema  Skin:  No rashes, lesions, or color changes  Pulses: 2+ and symmetric distally  Neuro:  AAOx3; cooperative and pleasant with no focal deficits  Rectal:  Good rectal tone. BRBPR    Laboratory:  Most Recent Data:  CBC: Lab Results   Component Value Date    WBC 6.73 10/29/2017    HGB 14.6 10/29/2017    HCT 44.8 10/29/2017     (L) 10/29/2017    MCV 92 10/29/2017    RDW 14.3 10/29/2017     BMP: Lab Results   Component Value Date     10/29/2017    K 5.6 (H) 10/29/2017     10/29/2017    CO2 23 10/29/2017    BUN 14 10/29/2017    CREATININE 1.27 10/29/2017     (H) 10/29/2017    CALCIUM 8.8 10/29/2017    MG 2.0 12/12/2016    PHOS 2.4 (L) 12/13/2016     LFTs: Lab Results   Component Value Date    PROT 8.2 10/29/2017    ALBUMIN 4.3 10/29/2017    BILITOT 1.6 (H) 10/29/2017    AST 44 10/29/2017    ALKPHOS 61 10/29/2017    ALT <6 (L) 10/29/2017     Coags:   Lab Results   Component Value Date    INR 1.3 (H) 10/29/2017     Urinalysis: Lab Results   Component Value Date    COLORU Yellow 12/12/2016    SPECGRAV 1.010 12/12/2016    NITRITE Negative 12/12/2016    KETONESU Negative 12/12/2016    UROBILINOGEN Negative 12/12/2016         Radiology:  Imaging Results          X-Ray Chest AP Portable (Final result)  Result time 10/29/17 15:34:43    Final result by Fani Alas Jr., MD (10/29/17 15:34:43)                 Impression:     No acute cardiopulmonary disease.      Electronically signed by: FANI ALAS  Date:     10/29/17  Time:    15:34              Narrative:    Exam: Portable chest radiograph    History:    Chest pain.    Comparison: 12/12/2016.    Findings:      The lungs are clear. The cardiac silhouette and mediastinum are within normal limits. The remaining osseous structures and soft tissues are within normal limits.   Tortuous thoracic aorta.  No effusion or pneumothorax.                                  Assessment:     Toby Green is a 87 y.o. male with:     Plan:     Acute blood loss anemia 2/2 likely upper GI bleed  - history of GI bleed in December 2016, at that time had gastritis  - currently presenting with 3 days of BRBPR  no weakness, lightheadedness, SOB  - H/H stable  will trend  transfuse <7  - will give protonix IV BID  - has Hx of alcohol abuse  will give octreotide and rocephin  - has two large bore IVs  - GI consulted  plan for EGD in AM    Hyperkalemia  - 5.6 on admission  will hold medications taht can cause hyperkalemia  - will get EKG  will give calcium gluconate, insulin, albuterol  - will reassess  - asymptomatic    Thrombocytopenia  - 131 on admission  previously worked up with negative HIV and hep panel  - continue to monitor  no evidence of easy bruising  - Hx of alcohol abuse, likely contributing  - will get U/S abdomen    Alcohol Abuse  - previously used to drink unquantifiable amount of alcohol  - currently states he drinks 3 beers/day  - will give thiamine, MV, folate  ativan PRN  - will get u/s abdomen    GERD  - on PPI as outpatient  - on PPI IV BID inpatient, in setting of GI bleed    Hypertension  - on cardizem? For HTN  - holding in setting of GI bleed    HLD  - on pravastatin  will continue  - will order lipid panel    Elevated bilirubin  - likely 2/2 blood loss  will get direct bilirubin  - will get U/S abdomen    Iron def anemia  - was not taking iron/colace as outpatient  - will restart at discahrge    BPH  - on flomax and finasteride  - will continue, monitor BPs  will hold if becomes hypotensive    Code Status:     full    Katie Reeves  [facility] Internal Medicine HO-II  [facility] Service    [facility] Medicine Hospitalist Pager numbers:   [facility] Hospitalist Medicine Team A (Michelle/Aleah): [phone]  [facility] Hospitalist Medicine Team B (Ghazal/Puneet):  [phone]

## 2017-10-30 NOTE — PROGRESS NOTES
"[facility] Resident HO-II Progress Note    Subjective:      Toby Green is a 87 y.o. male who is being followed by the [facility] IM service at [facility] for acute blood loss anemia 2/2 GI bleed.     Overnight, patient did well. No acute issues. Vitals have remained stable.  Denies any more bloody stools. Denies any melena or hematemesis.  No CP, SOB, cough, fever/chills. Has been NPO for procedure this AM.     Objective:   Last 24 Hour Vital Signs:  BP  Min: 94/50  Max: 136/79  Temp  Av.2 °F (36.8 °C)  Min: 97.9 °F (36.6 °C)  Max: 98.6 °F (37 °C)  Pulse  Av.7  Min: 58  Max: 106  Resp  Av.1  Min: 11  Max: 31  SpO2  Av.9 %  Min: 92 %  Max: 100 %  Height  Av' 9" (175.3 cm)  Min: 5' 9" (175.3 cm)  Max: 5' 9" (175.3 cm)  Weight  Av kg (156 lb 9.8 oz)  Min: 69.5 kg (153 lb 3.5 oz)  Max: 72.6 kg (160 lb)  No intake/output data recorded.    Physical Examination:  General: NAD, resting in bed comfortably.   HEENT: -rhinorrhea. Moist mucus membranes. OP clear and without erythema or exudate. Without sclera erythema/jaundice. EOMI. No hearing abnormalities detected on exam through conversation.   CV: RRR, no murmurs or rubs on exam. Normal S1, S2 appreciated. No palpable thrill on exam. Distal pulses 2+, symmetric. Cap refill time <2 seconds.   RESP: CTABL. Normal work of breathing. Symmetric.   Abdomen: s/nt/nd. Normal bowel sounds throughout.   Extremities: no clubbing/cyanosis/or peripheral edema.   Neuro: Alert and oriented to person, place, time, and event.      Laboratory:  Laboratory Data Reviewed: yes  Pertinent Findings:    Recent Labs  Lab 10/29/17  1448 10/29/17  1947 10/30/17  0526   WBC 6.73 7.05  --    HGB 14.6 12.6*  --    HCT 44.8 39.4*  --    * 123*  --    MCV 92 92  --    RDW 14.3 13.8  --      --  139   K 5.6*  --  4.7     --  109   CO2 23  --  24   BUN 14  --  18   CREATININE 1.27  --  1.4   *  --  192*   PROT 8.2  --  5.7*   ALBUMIN 4.3 "  --  2.7*   BILITOT 1.6*  --  0.4   AST 44  --  13   ALKPHOS 61  --  52*   ALT <6*  --  8*       Current Medications:     Infusions:   octreotide (SANDOSTATIN) infusion 50 mcg/hr (10/30/17 0558)        Scheduled:   cefTRIAXone (ROCEPHIN) IVPB  2 g Intravenous Q24H    finasteride  5 mg Oral Daily    folic acid  1 mg Oral Daily    loteprednol  1 drop Right Eye QID    loteprednol etabonate   Right Eye Daily    multivitamin  1 tablet Oral Daily    pantoprazole  40 mg Intravenous BID    pravastatin  80 mg Oral Daily    sodium chloride 0.9%  3 mL Intravenous Q8H    tamsulosin  1 capsule Oral Nightly    thiamine  100 mg Oral Daily        PRN:  ALPRAZolam, ramelteon      Assessment:     Toby Green is a 87 y.o.male with  Patient Active Problem List    Diagnosis Date Noted    Gastrointestinal hemorrhage 10/29/2017    Acute blood loss anemia 10/29/2017    Acute upper GI bleed 10/29/2017    Alcohol abuse 10/29/2017    Hyperkalemia 10/29/2017    Elevated bilirubin 10/29/2017    Vitamin D deficiency 08/18/2017    Iron deficiency anemia 12/13/2016    Thrombocytopenia 12/13/2016    History of GI bleed 12/12/2016    Horseshoe tear of retina 03/27/2013    History of retinal detachment 03/27/2013    Nuclear sclerosis 10/19/2012    Hyperlipidemia     Benign essential hypertension     GERD (gastroesophageal reflux disease)     Erectile dysfunction     Benign prostatic hyperplasia without lower urinary tract symptoms         Plan:     Acute blood loss anemia 2/2 likely upper GI bleed  - history of GI bleed in December 2016, at that time had gastritis  - currently presenting with 3 days of BRBPR  no weakness, lightheadedness, SOB  - H/H stable  will trend  transfuse <7  - will give protonix IV BID  - has Hx of alcohol abuse  will give octreotide and rocephin  - has two large bore IVs  - GI consulted  plan for EGD this AM     Hyperkalemia, resolved  - 5.6 on admission  will hold medications taht  can cause hyperkalemia  - EKG wnl  received calcium gluconate, insulin, albuterol  - will reassess  - asymptomatic     Thrombocytopenia  - 131 on admission  previously worked up with negative HIV and hep panel  - continue to monitor  no evidence of easy bruising  - Hx of alcohol abuse, likely contributing  - will get U/S abdomen     Alcohol Abuse  - previously used to drink unquantifiable amount of alcohol  - currently states he drinks 3 beers/day  - on octreotide and rocephin for SBP ppx  - will give thiamine, MV, folate  ativan PRN  - will get u/s abdomen     GERD  - on PPI as outpatient  - on PPI IV BID inpatient, in setting of GI bleed     Hypertension  - on cardizem? For HTN  - holding in setting of GI bleed     HLD  - on pravastatin  will continue  - will order lipid panel     Elevated bilirubin  - likely 2/2 blood loss  direct bilirubin WNL  - will get U/S abdomen     Iron def anemia  - was not taking iron/colace as outpatient  - will restart at discahrge     BPH  - on flomax and finasteride  - will continue, monitor BPs  will hold if becomes hypotensive    Katie Reeves  [facility] Internal Medicine HO-2  U IM Service Team A    [facility] Medicine Hospitalist Pager numbers:   [facility] Hospitalist Medicine Team A (Michelle/Aleah): [phone]  [facility] Hospitalist Medicine Team B (Ghazal/Puneet):  [phone]

## 2017-10-30 NOTE — PLAN OF CARE
Transferred to 5th floor via bed, monitor and RN, no s/s of acute distress, no c/o pain/discomfort, safety maintained family notified by pt of transfer

## 2017-10-30 NOTE — PROGRESS NOTES
Patient arrived to room, VSS, no complaints of pain.  AAOx3, NAD noted.  Instructed to call for assistance.  Bed alarm on.  Bed is in lowest position, wheels locked, call light within reach.  Go lytely in med room.

## 2017-10-31 ENCOUNTER — SURGERY (OUTPATIENT)
Age: 82
End: 2017-10-31

## 2017-10-31 ENCOUNTER — ANESTHESIA (OUTPATIENT)
Dept: ENDOSCOPY | Facility: HOSPITAL | Age: 82
DRG: 378 | End: 2017-10-31
Payer: MEDICARE

## 2017-10-31 ENCOUNTER — ANESTHESIA EVENT (OUTPATIENT)
Dept: ENDOSCOPY | Facility: HOSPITAL | Age: 82
DRG: 378 | End: 2017-10-31
Payer: MEDICARE

## 2017-10-31 VITALS
HEIGHT: 69 IN | OXYGEN SATURATION: 92 % | RESPIRATION RATE: 19 BRPM | HEART RATE: 82 BPM | WEIGHT: 153.25 LBS | BODY MASS INDEX: 22.7 KG/M2 | TEMPERATURE: 98 F | DIASTOLIC BLOOD PRESSURE: 78 MMHG | SYSTOLIC BLOOD PRESSURE: 143 MMHG

## 2017-10-31 PROBLEM — I47.10 SVT (SUPRAVENTRICULAR TACHYCARDIA): Status: ACTIVE | Noted: 2017-10-31

## 2017-10-31 PROBLEM — K92.1 MELENA: Status: ACTIVE | Noted: 2017-10-31

## 2017-10-31 PROBLEM — D50.0 IRON DEFICIENCY ANEMIA DUE TO CHRONIC BLOOD LOSS: Status: ACTIVE | Noted: 2017-10-31

## 2017-10-31 LAB
ALBUMIN SERPL BCP-MCNC: 3.1 G/DL
ALP SERPL-CCNC: 59 U/L
ALT SERPL W/O P-5'-P-CCNC: 10 U/L
ANION GAP SERPL CALC-SCNC: 8 MMOL/L
AORTIC VALVE REGURGITATION: ABNORMAL
AST SERPL-CCNC: 16 U/L
BASOPHILS # BLD AUTO: 0.04 K/UL
BASOPHILS NFR BLD: 0.5 %
BILIRUB SERPL-MCNC: 0.7 MG/DL
BUN SERPL-MCNC: 17 MG/DL
CALCIUM SERPL-MCNC: 8.5 MG/DL
CHLORIDE SERPL-SCNC: 106 MMOL/L
CO2 SERPL-SCNC: 27 MMOL/L
CREAT SERPL-MCNC: 1.3 MG/DL
DIASTOLIC DYSFUNCTION: YES
DIFFERENTIAL METHOD: ABNORMAL
EOSINOPHIL # BLD AUTO: 0.2 K/UL
EOSINOPHIL NFR BLD: 2.1 %
ERYTHROCYTE [DISTWIDTH] IN BLOOD BY AUTOMATED COUNT: 13.9 %
EST. GFR  (AFRICAN AMERICAN): 57 ML/MIN/1.73 M^2
EST. GFR  (NON AFRICAN AMERICAN): 49 ML/MIN/1.73 M^2
ESTIMATED PA SYSTOLIC PRESSURE: 22.01
GLUCOSE SERPL-MCNC: 130 MG/DL
HCT VFR BLD AUTO: 33.9 %
HGB BLD-MCNC: 11.1 G/DL
LYMPHOCYTES # BLD AUTO: 1.6 K/UL
LYMPHOCYTES NFR BLD: 21.5 %
MCH RBC QN AUTO: 30 PG
MCHC RBC AUTO-ENTMCNC: 32.7 G/DL
MCV RBC AUTO: 92 FL
MITRAL VALVE REGURGITATION: ABNORMAL
MONOCYTES # BLD AUTO: 0.6 K/UL
MONOCYTES NFR BLD: 7.3 %
NEUTROPHILS # BLD AUTO: 5.2 K/UL
NEUTROPHILS NFR BLD: 68.5 %
PLATELET # BLD AUTO: 123 K/UL
PMV BLD AUTO: 11.4 FL
POTASSIUM SERPL-SCNC: 4.3 MMOL/L
PROT SERPL-MCNC: 6.4 G/DL
RBC # BLD AUTO: 3.7 M/UL
RETIRED EF AND QEF - SEE NOTES: 60 (ref 55–65)
SODIUM SERPL-SCNC: 141 MMOL/L
WBC # BLD AUTO: 7.62 K/UL

## 2017-10-31 PROCEDURE — 63600175 PHARM REV CODE 636 W HCPCS: Performed by: STUDENT IN AN ORGANIZED HEALTH CARE EDUCATION/TRAINING PROGRAM

## 2017-10-31 PROCEDURE — A4216 STERILE WATER/SALINE, 10 ML: HCPCS | Performed by: INTERNAL MEDICINE

## 2017-10-31 PROCEDURE — 27201089 HC SNARE, DISP (ANY): Performed by: INTERNAL MEDICINE

## 2017-10-31 PROCEDURE — 88305 TISSUE EXAM BY PATHOLOGIST: CPT | Performed by: PATHOLOGY

## 2017-10-31 PROCEDURE — 63600175 PHARM REV CODE 636 W HCPCS: Performed by: INTERNAL MEDICINE

## 2017-10-31 PROCEDURE — 45385 COLONOSCOPY W/LESION REMOVAL: CPT | Mod: ,,, | Performed by: INTERNAL MEDICINE

## 2017-10-31 PROCEDURE — 80053 COMPREHEN METABOLIC PANEL: CPT

## 2017-10-31 PROCEDURE — 88305 TISSUE EXAM BY PATHOLOGIST: CPT | Mod: 26,,, | Performed by: PATHOLOGY

## 2017-10-31 PROCEDURE — 45385 COLONOSCOPY W/LESION REMOVAL: CPT | Performed by: INTERNAL MEDICINE

## 2017-10-31 PROCEDURE — 85025 COMPLETE CBC W/AUTO DIFF WBC: CPT

## 2017-10-31 PROCEDURE — 94761 N-INVAS EAR/PLS OXIMETRY MLT: CPT

## 2017-10-31 PROCEDURE — 25000003 PHARM REV CODE 250: Performed by: INTERNAL MEDICINE

## 2017-10-31 PROCEDURE — 37000009 HC ANESTHESIA EA ADD 15 MINS: Performed by: INTERNAL MEDICINE

## 2017-10-31 PROCEDURE — 63600175 PHARM REV CODE 636 W HCPCS: Performed by: NURSE ANESTHETIST, CERTIFIED REGISTERED

## 2017-10-31 PROCEDURE — 93306 TTE W/DOPPLER COMPLETE: CPT

## 2017-10-31 PROCEDURE — 93005 ELECTROCARDIOGRAM TRACING: CPT

## 2017-10-31 PROCEDURE — 25000003 PHARM REV CODE 250: Performed by: NURSE ANESTHETIST, CERTIFIED REGISTERED

## 2017-10-31 PROCEDURE — 0DBK8ZZ EXCISION OF ASCENDING COLON, VIA NATURAL OR ARTIFICIAL OPENING ENDOSCOPIC: ICD-10-PCS | Performed by: INTERNAL MEDICINE

## 2017-10-31 PROCEDURE — 37000008 HC ANESTHESIA 1ST 15 MINUTES: Performed by: INTERNAL MEDICINE

## 2017-10-31 PROCEDURE — 36415 COLL VENOUS BLD VENIPUNCTURE: CPT

## 2017-10-31 RX ORDER — HYDROMORPHONE HYDROCHLORIDE 2 MG/ML
0.4 INJECTION, SOLUTION INTRAMUSCULAR; INTRAVENOUS; SUBCUTANEOUS EVERY 5 MIN PRN
Status: CANCELLED | OUTPATIENT
Start: 2017-10-31

## 2017-10-31 RX ORDER — LIDOCAINE HCL/PF 100 MG/5ML
SYRINGE (ML) INTRAVENOUS
Status: DISCONTINUED | OUTPATIENT
Start: 2017-10-31 | End: 2017-10-31

## 2017-10-31 RX ORDER — LANOLIN ALCOHOL/MO/W.PET/CERES
100 CREAM (GRAM) TOPICAL DAILY
Qty: 30 TABLET | Refills: 3 | Status: SHIPPED | OUTPATIENT
Start: 2017-10-31 | End: 2018-03-23

## 2017-10-31 RX ORDER — DILTIAZEM HYDROCHLORIDE 240 MG/1
240 CAPSULE, COATED, EXTENDED RELEASE ORAL DAILY
Qty: 90 CAPSULE | Refills: 3 | Status: SHIPPED | OUTPATIENT
Start: 2017-10-31 | End: 2018-01-26 | Stop reason: SDUPTHER

## 2017-10-31 RX ORDER — SODIUM CHLORIDE 9 MG/ML
INJECTION, SOLUTION INTRAVENOUS CONTINUOUS PRN
Status: DISCONTINUED | OUTPATIENT
Start: 2017-10-31 | End: 2017-10-31

## 2017-10-31 RX ORDER — ADENOSINE 3 MG/ML
6 INJECTION, SOLUTION INTRAVENOUS ONCE
Status: COMPLETED | OUTPATIENT
Start: 2017-10-31 | End: 2017-10-31

## 2017-10-31 RX ORDER — FERROUS SULFATE 325(65) MG
325 TABLET ORAL 2 TIMES DAILY
Qty: 30 TABLET | Refills: 5 | Status: SHIPPED | OUTPATIENT
Start: 2017-10-31 | End: 2018-03-23

## 2017-10-31 RX ORDER — ONDANSETRON 2 MG/ML
4 INJECTION INTRAMUSCULAR; INTRAVENOUS DAILY PRN
Status: CANCELLED | OUTPATIENT
Start: 2017-10-31

## 2017-10-31 RX ORDER — ADENOSINE 3 MG/ML
12 INJECTION, SOLUTION INTRAVENOUS ONCE
Status: DISCONTINUED | OUTPATIENT
Start: 2017-10-31 | End: 2017-10-31 | Stop reason: HOSPADM

## 2017-10-31 RX ORDER — PROPOFOL 10 MG/ML
VIAL (ML) INTRAVENOUS CONTINUOUS PRN
Status: DISCONTINUED | OUTPATIENT
Start: 2017-10-31 | End: 2017-10-31

## 2017-10-31 RX ORDER — SODIUM CHLORIDE 0.9 % (FLUSH) 0.9 %
3 SYRINGE (ML) INJECTION
Status: DISCONTINUED | OUTPATIENT
Start: 2017-10-31 | End: 2017-10-31 | Stop reason: HOSPADM

## 2017-10-31 RX ORDER — FOLIC ACID 1 MG/1
1 TABLET ORAL DAILY
Qty: 30 TABLET | Refills: 3 | Status: SHIPPED | OUTPATIENT
Start: 2017-10-31 | End: 2018-03-23

## 2017-10-31 RX ORDER — ETOMIDATE 2 MG/ML
INJECTION INTRAVENOUS
Status: DISCONTINUED | OUTPATIENT
Start: 2017-10-31 | End: 2017-10-31

## 2017-10-31 RX ORDER — DOCUSATE SODIUM 100 MG/1
100 CAPSULE, LIQUID FILLED ORAL 2 TIMES DAILY
Qty: 30 CAPSULE | Refills: 5 | Status: SHIPPED | OUTPATIENT
Start: 2017-10-31 | End: 2017-11-03 | Stop reason: SDUPTHER

## 2017-10-31 RX ORDER — PHENYLEPHRINE HYDROCHLORIDE 10 MG/ML
INJECTION INTRAVENOUS
Status: DISCONTINUED | OUTPATIENT
Start: 2017-10-31 | End: 2017-10-31

## 2017-10-31 RX ADMIN — Medication 100 MG: at 08:10

## 2017-10-31 RX ADMIN — FINASTERIDE 5 MG: 5 TABLET, FILM COATED ORAL at 08:10

## 2017-10-31 RX ADMIN — CYANOCOBALAMIN 1000 MCG: 1000 INJECTION, SOLUTION INTRAMUSCULAR; SUBCUTANEOUS at 08:10

## 2017-10-31 RX ADMIN — FOLIC ACID 1 MG: 1 TABLET ORAL at 08:10

## 2017-10-31 RX ADMIN — LIDOCAINE HYDROCHLORIDE 80 MG: 20 INJECTION, SOLUTION INTRAVENOUS at 09:10

## 2017-10-31 RX ADMIN — PANTOPRAZOLE SODIUM 40 MG: 40 TABLET, DELAYED RELEASE ORAL at 08:10

## 2017-10-31 RX ADMIN — SODIUM CHLORIDE: 0.9 INJECTION, SOLUTION INTRAVENOUS at 09:10

## 2017-10-31 RX ADMIN — ETOMIDATE 6 MG: 2 INJECTION, SOLUTION INTRAVENOUS at 09:10

## 2017-10-31 RX ADMIN — THERA TABS 1 TABLET: TAB at 08:10

## 2017-10-31 RX ADMIN — PHENYLEPHRINE HYDROCHLORIDE 100 MCG: 10 INJECTION INTRAVENOUS at 10:10

## 2017-10-31 RX ADMIN — LOTEPREDNOL ETABONATE 1 DROP: 5 SUSPENSION/ DROPS OPHTHALMIC at 07:10

## 2017-10-31 RX ADMIN — PRAVASTATIN SODIUM 80 MG: 40 TABLET ORAL at 08:10

## 2017-10-31 RX ADMIN — ADENOSINE 6 MG: 3 INJECTION, SOLUTION INTRAVENOUS at 01:10

## 2017-10-31 RX ADMIN — PROPOFOL 100 MCG/KG/MIN: 10 INJECTION, EMULSION INTRAVENOUS at 09:10

## 2017-10-31 RX ADMIN — SODIUM CHLORIDE, PRESERVATIVE FREE 3 ML: 5 INJECTION INTRAVENOUS at 06:10

## 2017-10-31 RX ADMIN — ETOMIDATE 4 MG: 2 INJECTION, SOLUTION INTRAVENOUS at 10:10

## 2017-10-31 NOTE — CONSULTS
"[facility] Cardiology Consult - Resident Note    Primary Team Admitting:  [facility] Medicine Team A  Primary Team Resident:  Michelle  Consultant Team Attending:  Analilia  Consultant Team Resident: Ruy    Date of Consult: 10/31/2017    Reason for Consult:      SVT s/p Adenosine    Subjective:      History of Present Illness:  Toby Green is a 87 y.o. male who  has a past medical history of Benign essential hypertension; Borderline glaucoma, open angle with borderline findings (10/19/2012); BPH (benign prostatic hypertrophy); Erectile dysfunction; GERD (gastroesophageal reflux disease); Hyperlipidemia; Iron deficiency anemia (12/13/2016); and Nuclear sclerosis (10/19/2012).. The patient presented to [facility] on 10/29/2017 with a primary complaint of Rectal Bleeding (dark black bloody stools since Thursday. He denies pain or feeling weak )    Pt presented c/o BRBPR which first occurred several days PTA.  By the morning of admit, it had become more frequent and ultimately just gross blood without stool.  Denies any hx melena or chronic NSAID use but notes hx of heavy EtOH for which he recently "cut back" to few beers per day.  He had no associated nausea, emesis, or abdominal pain.  He also denies chest pain, palpitations or dizziness.  During admission, pt underwent EGD which revealed duodenitis and CSC, which was a poor prep.  Pt was being monitored on telemetry when he became grossly tachycardic with borderline BPs.  He received a single dose of adenosine with resolution of tachycardia and improvement in BPs.      Past Medical History:  Past Medical History:   Diagnosis Date    Benign essential hypertension     Borderline glaucoma, open angle with borderline findings 10/19/2012    BPH (benign prostatic hypertrophy)     Erectile dysfunction     GERD (gastroesophageal reflux disease)     Hyperlipidemia     Iron deficiency anemia 12/13/2016    Nuclear sclerosis 10/19/2012 "       Allergies:  Review of patient's allergies indicates:   Allergen Reactions    Aleve  [naproxen sodium]      Other reaction(s): stomch bleeding    Naproxen      Other reaction(s): bleeding    Ticlid  [ticlopidine]      Other reaction(s): extreme bleeding       Home Medications:  Prior to Admission medications    Medication Sig Start Date End Date Taking? Authorizing Provider   finasteride (PROSCAR) 5 mg tablet TAKE 1 TABLET BY MOUTH EVERY DAY 17  Yes Lindsey Du MD   LOTEMAX 0.5 % DrpG INSTILL 1 DROP IN RIGHT EYE DAILY 16  Yes Historical Provider, MD   pravastatin (PRAVACHOL) 80 MG tablet TAKE 1 TABLET (80 MG TOTAL) BY MOUTH ONCE DAILY. 3/26/17  Yes Lindsey Du MD   tamsulosin (FLOMAX) 0.4 mg Cp24 TAKE 1 CAPSULE BY MOUTH NIGHTLY 3/14/17  Yes Lindsey Du MD   aspirin (ECOTRIN) 81 MG EC tablet Take 81 mg by mouth once daily.    Historical Provider, MD   cyclobenzaprine (FLEXERIL) 5 MG tablet Take 5 mg by mouth 3 (three) times daily as needed. 1/15/17   Historical Provider, MD   diltiaZEM (CARDIZEM CD) 240 MG 24 hr capsule Take 1 capsule (240 mg total) by mouth once daily. 10/30/17   Katie Reeves MD   folic acid (FOLVITE) 1 MG tablet Take 1 tablet (1 mg total) by mouth once daily. 10/30/17 10/30/18  Katie Reeves MD   multivitamin (THERAGRAN) tablet Take 1 tablet by mouth once daily. 10/30/17   Katie Reeves MD   omeprazole (PRILOSEC) 40 MG capsule Take 1 capsule (40 mg total) by mouth once daily. 17  Lindsey Du MD   thiamine 100 MG tablet Take 1 tablet (100 mg total) by mouth once daily. 10/30/17   Katie Reeves MD   trazodone (DESYREL) 50 MG tablet Take 1 tablet (50 mg total) by mouth nightly as needed for Insomnia. 17  Lindsey Du MD          Objective:   Last 24 Hour Vital Signs:  BP  Min: 61/35  Max: 193/103  Temp  Av.9 °F (36.6 °C)  Min: 96.3 °F (35.7 °C)  Max: 98.7 °F (37.1  °C)  Pulse  Av.9  Min: 73  Max: 183  Resp  Av.1  Min: 16  Max: 37  SpO2  Av.9 %  Min: 94 %  Max: 98 %  Body mass index is 22.63 kg/m².  I/O last 3 completed shifts:  In: 2610 [P.O.:1050; I.V.:1510; IV Piggyback:50]  Out: 1750 [Urine:1750]    Physical Examination:  General: Alert and awake in NAD.  Elderly appearing  HENT:  NCAT; anicteric sclera with left ptosis; OP clear with MMM  Cardio:  Regular rate and rhythm with normal S1 and S2; no murmurs  Resp:  CTAB; respirations unlabored; no wheezes, crackles or rhonchi  Abdom: Soft, NTND with normoactive bowel sounds  Extrem: Warm and dry without edema  Pulses:  2+ and symmetric distally  Neuro:  AAOx3; cooperative and pleasant. 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                     X-Ray Chest AP Portable (Final result)  Result time 10/29/17 15:34:43    Final result by Yoel Alas Jr., MD (10/29/17 15:34:43)                 Impression:     No acute cardiopulmonary disease.                         Assessment:     Toby Green is a 87 y.o. male with:     Plan:     AVNRT s/p Adenosine:  - admitted for BRBPR.  No NSAIDs.  Hx  EtOH abuse  - rapid called 10/31 for rapid HR.  SBP 80's.  Asymptomatic   - 10/31 ECG: SVT with anterolateral STD.  Previous ECG NSR x 2 this admit  - No hx cardiac disease, palpitations, syncope  - no gross electrolyte abnorms.  Hgb stable since admit after fluid resuscitation   - on Diltiazem at home but held IP 2/2 GIB  - ECHO with norm LVEF and relaxation abnormality, mild TR/MR/AI  - Recommend continuing on home Diltiazem.  Alternatively, can consider transition to BB instead    No further recommendations.  Signing off. Thank you for the consult.  Please contact us with any questions or concerns      Code Status:     FULL    Abelino Redmond  [facility] Medicine HO-III  [facility] Cardiology Service

## 2017-10-31 NOTE — PHYSICIAN QUERY
PT Name: Toby Green  MR #: [id number]     Physician Query Form - Etiology of Condition Clarification      CDS/: Aida Rodney               Contact information: [email or web address]  This form is a permanent document in the medical record.     Query Date: October 31, 2017    By submitting this query, we are merely seeking further clarification of documentation.  Please utilize your independent clinical judgment when addressing the question(s) below.     The medical record contains the following:    Findings Supporting Clinical Information Location in Medical Record   Acute blood loss anemia 2/2 likely upper vs lower GI bleed            Mild non-erosive duodenitis      Multiple small and large diverticula throughout    the sigmoid and ascending colon   No active bleeding noted   Two polyps <1cm removed from the ascending colon     Poor prep for colonoscopy.    PN 10/31        EGD Report 10/30      Colonoscopy Report 10/31              PN 10/31     Please document your best medical opinion regarding the etiology of ____GI Bleed_____________ for which the primary focus of treatment is/was directed.         Lower GI bleed; suspect diverticular bleed. Resolving.                 [    ]  Clinically Undetermined    Please document in your progress notes daily for the duration of treatment, until resolved, and include in your discharge summary.

## 2017-10-31 NOTE — PROGRESS NOTES
Patient seen and examined.  I agree with the findings and plan of care as in the resident note.     1. Upper GI Bleed - EGD with non-erosive esophagitis.  Poor prep for colonoscopy.  Monitor H/H closely.  GI recommending outpatient colonoscopy.  I offered pt a repeat inpatient colonoscopy here at [facility] but pt insists on it being done as an outpatient at [facility].  S/P 1 unit PRBC.   Continue PPI.  FU with GI on discharge.  2. Anemia - ETIENNE consistent with iron deficiency and B!2 deficiency.  Start supplementation on discharge.  Plan for outpatient colonoscopy.  3. Alcohol Abuse - Counseled on cessation.  Start MVI, thiamine, and folate on discharge.  4. HTN -  Continue home medications.  Monitor BP and adjust meds accordingly.  Low salt diet/exercise encouraged.  5. SVT - Responsive to adenosine.  Cardiology evaluation today. Check echocardiogram.    6. Dispo - DC today after cardiology evaluation with FU with PCP, Cards, and GI.

## 2017-10-31 NOTE — PROGRESS NOTES
[facility] IM Resident JORGE Progress Note    Subjective:      Toby Green is a 87 y.o. male who is being followed by the [facility] IM service at [facility] for acute blood loss anemia 2/2 GI bleed.     Overnight patient with episode of AVNRT per night coverage team. MET was called, please see documentation in chart. Patient reverted to NSR with 6mg adenosine push. No other complaints overnight. Patient NPO for procedure this morning.      Objective:   Last 24 Hour Vital Signs:  BP  Min: 61/35  Max: 193/103  Temp  Av.7 °F (36.5 °C)  Min: 96.3 °F (35.7 °C)  Max: 98.7 °F (37.1 °C)  Pulse  Av  Min: 61  Max: 183  Resp  Av.9  Min: 9  Max: 37  SpO2  Av.3 %  Min: 92 %  Max: 98 %  I/O last 3 completed shifts:  In: 2610 [P.O.:1050; I.V.:1510; IV Piggyback:50]  Out: 1750 [Urine:1750]    Physical Examination:  Patient off the floor for colonoscopy at time of evaluation. Will reassess on rounds.       Laboratory:  Laboratory Data Reviewed: yes  Pertinent Findings:    Recent Labs  Lab 10/29/17  1448 10/29/17  1947 10/30/17  0526 10/30/17  0527 10/31/17  0110   WBC 6.73 7.05  --  7.67 7.62   HGB 14.6 12.6*  --  10.9* 11.1*   HCT 44.8 39.4*  --  34.7* 33.9*   * 123*  --  117* 123*   MCV 92 92  --  93 92   RDW 14.3 13.8  --  14.1 13.9     --  139  --  141   K 5.6*  --  4.7  --  4.3     --  109  --  106   CO2 23  --  24  --  27   BUN 14  --  18  --  17   CREATININE 1.27  --  1.4  --  1.3   *  --  192*  --  130*   PROT 8.2  --  5.7*  --  6.4   ALBUMIN 4.3  --  2.7*  --  3.1*   BILITOT 1.6*  --  0.4  --  0.7   AST 44  --  13  --  16   ALKPHOS 61  --  52*  --  59   ALT <6*  --  8*  --  10       Current Medications:     Infusions:        Scheduled:   adenosine  12 mg Intravenous Once    cyanocobalamin  1,000 mcg Intramuscular Daily    finasteride  5 mg Oral Daily    folic acid  1 mg Oral Daily    loteprednol  1 drop Both Eyes QAM    loteprednol etabonate   Right Eye Daily     multivitamin  1 tablet Oral Daily    pantoprazole  40 mg Oral Daily    pravastatin  80 mg Oral Daily    sodium chloride 0.9%  3 mL Intravenous Q8H    tamsulosin  1 capsule Oral Nightly    thiamine  100 mg Oral Daily        PRN:  ALPRAZolam, ramelteon      Assessment:     Toby Green is a 87 y.o.male with  Patient Active Problem List    Diagnosis Date Noted    Vitamin B12 deficiency anemia 10/30/2017    Duodenitis without bleeding 10/30/2017    Orthostatic hypotension     Gastrointestinal hemorrhage with melena 10/29/2017    Acute blood loss anemia 10/29/2017    Acute upper GI bleed 10/29/2017    Alcohol abuse 10/29/2017    Hyperkalemia 10/29/2017    Elevated bilirubin 10/29/2017    Vitamin D deficiency 08/18/2017    Iron deficiency anemia 12/13/2016    Thrombocytopenia 12/13/2016    History of GI bleed 12/12/2016    Horseshoe tear of retina 03/27/2013    History of retinal detachment 03/27/2013    Nuclear sclerosis 10/19/2012    Hyperlipidemia     Benign essential hypertension     GERD (gastroesophageal reflux disease)     Erectile dysfunction     Benign prostatic hyperplasia without lower urinary tract symptoms         Plan:     Acute blood loss anemia 2/2 likely upper vs lower GI bleed  - history of GI bleed in December 2016, at that time had gastritis  - currently presenting with 3 days of BRBPR  no weakness, lightheadedness, SOB  - H/H stable  continue trend  transfuse if <7  - Protonix daily  - has Hx of alcohol abuse initially given octreotide and rocephin for suspect liver disease but since discontinued with no cirrhosis or ascites detected on ultrasound and no esophageal varices.   - has two large bore IVs  - GI consulted  EGD yesterday with non bleeding duodenal ulcer, non erosive duodenitis. Colonoscopy this morning.     SVT  - Supraventricular tachycardia overnight, MET called, responded to adenosine  - will consult cardiology for evaluation. echocardiogram pending.       Hyperkalemia, resolved  - 5.6 on admission  will hold medications that can cause hyperkalemia  - EKG wnl  received calcium gluconate, insulin, albuterol to shift  - following     Thrombocytopenia  - 131 on admission  previously worked up with negative HIV and hep panel  - continue to monitor  no evidence of easy bruising  - Hx of alcohol abuse, likely contributing  - U/S abdomen with liver cysts, largest cyst stable from prior, consistent with simple cysts.      Alcohol Abuse  - previously used to drink unquantifiable amount of alcohol  - currently states he drinks 3 beers/day  - will give thiamine, MV, folate  ativan PRN  - will get u/s abdomen     GERD  - continue protonix     Hypertension  - on cardizem? For HTN  - holding in setting of active GI bleed     HLD  - on pravastatin  will continue  - lipid panel with HDL 36, total cholesterol 135.     Elevated bilirubin  - likely 2/2 blood loss  direct bilirubin WNL  - U/S abdomen without biliary pathology     Acute blood loss anemia with VICK 2/2 chronic blood loss.   - was not taking iron/colace as outpatient  - will restart at [facility]     BPH  - on flomax and finasteride  - will continue, monitor BPs  will hold if becomes hypotensive    Code: full  DVT PPX: hold for GI bleed  Diet: NPO for c-scope    Dispo: pending C-scope. Likely dispo to home. No DME needs currently.       Tee Mejia  \[facility] [place] [facility]\"" Internal Medicine HO-2  \[facility] [place] [facility]\"" IM Service Team A    \[facility] [place] [facility]\"" Medicine Hospitalist Pager numbers:   \[facility] [place] [facility]\"" Hospitalist Medicine Team A (Michelle/Aleah): [phone]  \[facility] [place] [facility]\"" Hospitalist Medicine Team B (Ghazal/Puneet):  [phone]

## 2017-10-31 NOTE — PLAN OF CARE
Problem: Patient Care Overview  Goal: Plan of Care Review  Outcome: Ongoing (interventions implemented as appropriate)  Pt in NAD. V/s stable. AAOx4. Scheduled medication given per MAR. PIV saline locked dressing CDI. Cardiac monitoring in place. NPO status maintained. Golytly prep being given pt having multiple bowel movements that are black/dark red and watery. Urinary catheter in place draining clear yellow urine. Encouraged to call for assistance verbalized understanding. Safety maintained bed in low locked position, SR up X2, bed alarm on, and call bell in reach. Will continue to monitor.

## 2017-10-31 NOTE — TRANSFER OF CARE
"Anesthesia Transfer of Care Note    Patient: Toby Green    Procedure(s) Performed: Procedure(s) (LRB):  COLONOSCOPY (N/A)    Patient location: GI    Anesthesia Type: MAC    Transport from OR: Transported from OR on room air with adequate spontaneous ventilation    Post pain: adequate analgesia    Post assessment: tolerated procedure well and no apparent anesthetic complications    Post vital signs: stable    Level of consciousness: awake    Nausea/Vomiting: no nausea/vomiting    Complications: none    Transfer of care protocol was followed      Last vitals:   Visit Vitals  BP (!) 159/104   Pulse 87   Temp 36.8 °C (98.3 °F)   Resp 18   Ht 5' 9" (1.753 m)   Wt 69.5 kg (153 lb 3.5 oz)   SpO2 95%   BMI 22.63 kg/m²     "

## 2017-10-31 NOTE — DISCHARGE INSTRUCTIONS
Discharge Summary/Instructions for after Colonoscopy with Biopsy/Polypectomy    Toby Green  10/31/2017  Mohit Martinez MD    Restrictions on Activity:    - Do not drive car or operate machinery until the day after the procedure.  - The following day: return to full activity including work.  - For 3 days: No heavy lifting, straining or running.  - Diet: Eat and drink normally unless instructed otherwise.    Treatment for Common Side Effects:  - Mild abdominal pain and bloating or excessive gas: rest, eat lightly and use a heating pad.     Symptoms to watch for and report to your physician:  1. Severe abdominal pain.  2. Fever within 24 hours after a procedure.  3. A large amount of rectal bleeding. (A small amount of blood from the rectum is not serious, especially if hemorrhoids are present.)  4. Because air was put into your colon during the procedure, expelling large amount of air from your rectum is normal.  5. You may not have a bowel movement for 1-3 days because of the colonoscopy prep. This is normal.  6. Go directly to the emergency room if you notice any of the following:     Chills and/or fever over 101   Persistent vomiting   Severe abdominal pain, other than gas cramps   Severe chest pain   Black, tarry stools   Any bleeding - exceeding one tablespoon    If you have any questions or problems, please call your Physician:    Mohit Martinez MD      Lab Results: Contact Physician's Office      If a complication or emergency situation arises and you are unable to reach your Physician - GO TO THE EMERGENCY ROOM.

## 2017-10-31 NOTE — PLAN OF CARE
10/31/17     Physical exam at time of rounds:    General: no acute distress, appears as stated age, resting comfortably  Head: normocephalic, atraumatic   Eyes: EOMI, PERRL, without scleral icterus  Ears, Nose, Throat: no gross auditory deficit detected via conversation on normal room volume , without rhinorrhea, Oropharynx clear and without exudate or erythema,   Neck: supple, without thyromegaly, full range of motion intact  Cardiovascular: NRRR, without murmurs or rubs, without extra heart sounds, peripheral pulses 2+ throughout , capillary refill time <2 seconds  Pulmonary: CTABL, normal work of breathing without accessory muscle use, symmetric expansion  Abdomen: soft, nontender to palpation throughout, non-distended, without hepatosplenomegaly  MSKL: FROM intact, no gross deformities  Skin: without cyanosis, clubbing, or edema   Neurologic: alert and oriented to person, place, time. Strength 5/5 throughout. Sensation intact to light palpation across bilateral lower extremities.

## 2017-10-31 NOTE — ANESTHESIA POSTPROCEDURE EVALUATION
"Anesthesia Post Evaluation    Patient: Toby Green    Procedure(s) Performed: Procedure(s) (LRB):  ESOPHAGOGASTRODUODENOSCOPY (EGD) (N/A)    Final Anesthesia Type: MAC  Patient location during evaluation: PACU  Patient participation: Yes- Able to Participate  Level of consciousness: awake and alert  Post-procedure vital signs: reviewed and stable  Pain management: adequate  Airway patency: patent  PONV status at discharge: No PONV  Anesthetic complications: no      Cardiovascular status: hemodynamically stable  Respiratory status: spontaneous ventilation and unassisted  Hydration status: euvolemic  Follow-up not needed.        Visit Vitals  BP (!) 170/83   Pulse 99   Temp 35.7 °C (96.3 °F) (Oral)   Resp 20   Ht 5' 9" (1.753 m)   Wt 69.5 kg (153 lb 3.5 oz)   SpO2 95%   BMI 22.63 kg/m²       Pain/Rosemarie Score: Pain Assessment Performed: Yes (10/30/2017  5:05 PM)  Presence of Pain: denies (10/30/2017  5:05 PM)      "

## 2017-10-31 NOTE — ANESTHESIA POSTPROCEDURE EVALUATION
"Anesthesia Post Evaluation    Patient: Toby Green    Procedure(s) Performed: Procedure(s) (LRB):  COLONOSCOPY (N/A)    Final Anesthesia Type: MAC  Patient location during evaluation: PACU  Patient participation: Yes- Able to Participate  Level of consciousness: awake  Post-procedure vital signs: reviewed and stable  Pain management: adequate  Airway patency: patent  PONV status at discharge: No PONV  Anesthetic complications: no      Cardiovascular status: hemodynamically stable and blood pressure returned to baseline  Respiratory status: spontaneous ventilation and unassisted  Hydration status: euvolemic  Follow-up not needed.        Visit Vitals  /77 (BP Location: Right arm, Patient Position: Lying)   Pulse 73   Temp 36.8 °C (98.3 °F) (Oral)   Resp 18   Ht 5' 9" (1.753 m)   Wt 69.5 kg (153 lb 3.5 oz)   SpO2 96%   BMI 22.63 kg/m²       Pain/Rosemarie Score: Pain Assessment Performed: Yes (10/31/2017 10:53 AM)  Presence of Pain: denies (10/31/2017 10:53 AM)  Rosemarie Score: 10 (10/31/2017 10:50 AM)      "

## 2017-10-31 NOTE — PLAN OF CARE
Called to bedside by medical emergency with patient HR in the 200s, SBP 90s.   EKG with AVNRT at bedside.  Given 6 adenosine x1 with conversion back to NSR.  Patient asymptomatic throughout the event.  Now resting comfortably with nurses at bedside.  Will continue to monitor.    Yoel Bauer MD  [facility] Internal Medicine HO-1  [phone]

## 2017-10-31 NOTE — PLAN OF CARE
Pt to d/c today, no HH or DME needs.     Pt will need Follow up appt with PCP or preferred GI. Office now closed     10/31/17 1640   Final Note   Assessment Type Final Discharge Note   Discharge Disposition Home   What phone number can be called within the next 1-3 days to see how you are doing after discharge? [phone]   Hospital Follow Up  Appt(s) scheduled? Yes   Discharge plans and expectations educations in teach back method with documentation complete? Yes   Right Care Referral Info   Post Acute Recommendation No Care

## 2017-10-31 NOTE — NURSING
Assisting pt to bedside commode telemetry called stated pt HR: 215 assisted pt to bed. Pt denied SOB or feeling lightheaded. MET called at 0033 team arrived EKG done, BP began to drop to 61/35 then to 63/40, O2 sat: 98% RA, and MD ordered adenosine rapid push at 0056. Pt BP and HR stabilized. MD ordered bolus of 500 mL NS pt received approximately 200 mL then MD canceled order. Pt BP raised to 124/78 HR: 99. Will continue to monitor.

## 2017-10-31 NOTE — PLAN OF CARE
Gi will sign off.  If any new issues or questions arise please call.  Patient can follow up with PCP or regular GI at main campus if needed.

## 2017-10-31 NOTE — ANESTHESIA PREPROCEDURE EVALUATION
10/31/2017  Toby Green is a 87 y.o., male for colonoscopy. Had EGD  Yesterday NAAC. Had SVT overnight required adenosine.     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 tavarez    RETINAL DETACHMENT SURGERY  3/26/13    Right eye    TONSILLECTOMY       Patient Active Problem List   Diagnosis    Hyperlipidemia    Benign essential hypertension    GERD (gastroesophageal reflux disease)    Erectile dysfunction    Benign prostatic hyperplasia without lower urinary tract symptoms    Nuclear sclerosis    Horseshoe tear of retina    History of retinal detachment    History of GI bleed    Iron deficiency anemia    Thrombocytopenia    Vitamin D deficiency    Gastrointestinal hemorrhage with melena    Acute blood loss anemia    Acute upper GI bleed    Alcohol abuse    Hyperkalemia    Elevated bilirubin    Vitamin B12 deficiency anemia    Duodenitis without bleeding    Orthostatic hypotension     Wt Readings from Last 3 Encounters:   10/29/17 69.5 kg (153 lb 3.5 oz)   07/31/17 72.7 kg (160 lb 4.4 oz)   01/09/17  70 kg (154 lb 5.2 oz)     Temp Readings from Last 3 Encounters:   10/31/17 37.1 °C (98.7 °F) (Oral)   01/06/17 36.9 °C (98.4 °F) (Oral)   12/13/16 36.8 °C (98.2 °F) (Oral)     BP Readings from Last 3 Encounters:   10/31/17 (!) 164/94   07/31/17 (!) 144/92   01/09/17 (!) 169/72     Pulse Readings from Last 3 Encounters:   10/31/17 101   07/31/17 95   01/09/17 84         Anesthesia Evaluation    I have reviewed the Patient Summary Reports.     I have reviewed the Medications.     Review of Systems      Physical Exam  General:  Well nourished    Airway/Jaw/Neck:  Airway Findings: Mouth Opening: Normal Tongue: Normal  General Airway Assessment: Adult  Mallampati: II  Improves to II with phonation.  TM Distance: Normal, at least 6 cm       Chest/Lungs:  Chest/Lungs Findings: Clear to auscultation, Normal Respiratory Rate     Heart/Vascular:  Heart Findings: Rate: Normal  Rhythm: Regular Rhythm        Mental Status:  Mental Status Findings:  Cooperative, Alert and Oriented           Lab Results   Component Value Date    WBC 7.62 10/31/2017    HGB 11.1 (L) 10/31/2017    HCT 33.9 (L) 10/31/2017    MCV 92 10/31/2017     (L) 10/31/2017       Chemistry        Component Value Date/Time     10/31/2017 0110    K 4.3 10/31/2017 0110     10/31/2017 0110    CO2 27 10/31/2017 0110    BUN 17 10/31/2017 0110    CREATININE 1.3 10/31/2017 0110     (H) 10/31/2017 0110        Component Value Date/Time    CALCIUM 8.5 (L) 10/31/2017 0110    ALKPHOS 59 10/31/2017 0110    AST 16 10/31/2017 0110    ALT 10 10/31/2017 0110    BILITOT 0.7 10/31/2017 0110    ESTGFRAFRICA 57 (A) 10/31/2017 0110    EGFRNONAA 49 (A) 10/31/2017 0110          Vent. Rate : 074 BPM     Atrial Rate : 074 BPM     P-R Int : 160 ms          QRS Dur : 082 ms      QT Int : 368 ms       P-R-T Axes : 045 016 043 degrees     QTc Int : 408 ms    Normal sinus rhythm  Normal ECG  When compared with ECG of 29-OCT-2017 15:59,  No significant change was  found  Confirmed by Joshua Hall MD ([id number]) on 10/30/2017 2:10:08 PM    Referred By: [email or web address]   SELF           Confirmed By:Joshua Hall MD      Specimen Collected: 10/29/17 19:33 Last Resulted: 10/30/17 14:10             Anesthesia Plan  Type of Anesthesia, risks & benefits discussed:  Anesthesia Type:  MAC  Patient's Preference:   Intra-op Monitoring Plan:   Intra-op Monitoring Plan Comments:   Post Op Pain Control Plan:   Post Op Pain Control Plan Comments:   Induction:   IV  Beta Blocker:  Patient is not currently on a Beta-Blocker (No further documentation required).       Informed Consent: Patient understands risks and agrees with Anesthesia plan.  Questions answered. Anesthesia consent signed with patient.  ASA Score: 3     Day of Surgery Review of History & Physical: I have interviewed and examined the patient. I have reviewed the patient's H&P dated:  There are no significant changes.  H&P update referred to the provider.     Anesthesia Plan Notes: Patient, at am 10/31, had an episode of SVT up to 215 bpm treated with adenosine and resolved to NSR.        Ready For Surgery From Anesthesia Perspective.

## 2017-11-01 ENCOUNTER — PATIENT OUTREACH (OUTPATIENT)
Dept: ADMINISTRATIVE | Facility: CLINIC | Age: 82
End: 2017-11-01

## 2017-11-01 DIAGNOSIS — R00.0 TACHYCARDIA: Primary | ICD-10-CM

## 2017-11-01 NOTE — PROGRESS NOTES
Pt and spouse given discharge instructions and AVS packet.  Pt and spouse given new prescription. Name, purpose, frequencies and side effects explained. Pt and spouse without and questions.  Pt down per transport.

## 2017-11-01 NOTE — PATIENT INSTRUCTIONS
When You Have Gastrointestinal (GI) Bleeding    Blood in your vomit or stool can be a sign of gastrointestinal (GI) bleeding. GI bleeding can be scary. But the cause may not be serious. You should always see a doctor if GI bleeding occurs.  The GI tract  The GI tract is the path through which food travels in the body. Food passes from the mouth down the esophagus (the tube from the mouth to the stomach). Food begins to break down in the stomach. It then moves through the duodenum, the first part of the small intestine. Nutrients are absorbed as food travels through the small intestine. What is left passes into the colon (large intestine) as waste. The colon removes water from the waste. Waste continues from the colon to the rectum (where stool is stored). Waste then leaves the body through the anus.  Causes of GI bleeding  GI bleeding can be caused by many different problems. Some of the more common causes include:  · Swollen veins in the anus (hemorrhoids)  · Swollen veins in the esophagus (varices)  · Sore on the lining of the GI tract (ulcer)  · Cuts or scrapes in the mouth or throat  · Infection caused by germs such as bacteria or parasites  · Food allergies, such as milk allergy in young children  · Medicines  · Inflammation of the GI tract (gastritis or esophagitis)  · Colitis (Crohn's disease or ulcerative colitis)  · Cancer (tumors or polyps)  · Abnormal pouches in the colon (diverticula)  · Tears in the esophagus or anus  · Nosebleed  · Abnormal blood vessels in the GI tract (angiodysplasia)  Diagnosing the cause of blood in stool  If blood is coming out in your stool, you may have a lower GI tract problem or a very fast upper GI tract bleed. Bleeding from the GI tract can be bright red. Or it may look dark and tarry. Tests may also find blood in your stool that cant be seen with the eye (occult blood). To find out the cause, tests that may be ordered include:  · Blood tests. A blood sample is taken and  sent to a lab for exam.  · Hemoccult test. Checks a stool sample for blood.  · Stool culture. Checks a stool sample for bacteria or parasites.  · X-ray, ultrasound, or CT scan. Imaging tests that take pictures of the digestive tract.  · Colonoscopy or sigmoidoscopy. This test uses a flexible tube with a tiny camera. The tube is inserted through your anus into your rectum to see the inside of your colon. Your provider can also take a tiny tissue sample (biopsy) and treat a bleeding source  Diagnosing the cause of blood in vomit  If you are vomiting blood or something that looks like coffee grounds, you may have an upper GI tract problem. To find the cause, tests that may be done include:  · Upper Endoscopy. A flexible tube with a tiny camera is inserted through your mouth and throat to see inside your upper GI tract. This lets your provider take a tiny tissue sample (biopsy) and treat a bleeding source.  · Nasogastric lavage. This can tell if you have upper GI or lower GI bleeding.  · X-ray, ultrasound, or CT scan. Imaging tests that take pictures of your digestive tract.  · Upper GI series. X-rays of the upper part of your GI tract taken from inside your body.  · Enteroscopy. This sends a flexible tube or a small, swallowed capsule camera into your small intestine.  When to call your healthcare provider  Call your healthcare provider right away if you have any of the following:  · Bleeding from your mouth or anus that can't be stopped  · Fever of 100.4°F (38.0°) or higher  · Bleeding along with feeling lightheaded or dizzy  · Signs of fluid loss (dehydration). These include a dry, sticky mouth, decreased urine output; and very dark urine.  · Belly (abdominal) pain   Date Last Reviewed: 7/1/2017  © 3940-2397 [employer]. [address] [place] [address], [place] [address]. All rights reserved. This information is not intended as a substitute for professional medical care. Always follow your healthcare  professional's instructions.

## 2017-11-01 NOTE — DISCHARGE SUMMARY
[facility] Discharge Summary    Primary Team: [facility]  Attending Physician: Mohit Martinez  Resident: Tee Mejia  Intern: Clotilde Lagos    Date of Admit: 10/29/2017  Date of Discharge: 10/31/2017    Discharge to: home  Condition: stable    Discharge Diagnoses     Patient Active Problem List   Diagnosis    Hyperlipidemia    Benign essential hypertension    GERD (gastroesophageal reflux disease)    Erectile dysfunction    Benign prostatic hyperplasia without lower urinary tract symptoms    Nuclear sclerosis    Horseshoe tear of retina    History of retinal detachment    History of GI bleed    Iron deficiency anemia    Thrombocytopenia    Vitamin D deficiency    Gastrointestinal hemorrhage with melena    Acute blood loss anemia    Acute upper GI bleed    Alcohol abuse    Hyperkalemia    Elevated bilirubin    Vitamin B12 deficiency anemia    Duodenitis without bleeding    Orthostatic hypotension    SVT (supraventricular tachycardia)    Iron deficiency anemia due to chronic blood loss    Melena       Consultants and Procedures     Consultants:  Gastroenterology  Cardiology    Procedures:   10/30/17 EGD - significant for mild no-erosive duodenitis, normal esophagus, stomach.   10/31/17 Colonoscopy - significant for multiple small and large-mouthed diverticula found in the sigmoid colon and ascending colon, however no active bleeding noted, two polyps <1cm removed from the ascending colon.     Brief History of Present Illness      Toby Green is a 87 y.o. male who  has a past medical history of Benign essential hypertension; Borderline glaucoma, open angle with borderline findings (10/19/2012); BPH (benign prostatic hypertrophy); Erectile dysfunction; GERD (gastroesophageal reflux disease); Hyperlipidemia; Iron deficiency anemia (12/13/2016); and Nuclear sclerosis (10/19/2012).  The patient presented to [facility] on 10/29/2017 with a primary  complaint of Rectal Bleeding (dark black bloody stools since Thursday. He denies pain or feeling weak )  .     Patient presented with 3 day history of bright red blood per rectum, without associated abdominal pain or orthostatic hypotension. Denied weakness, lightheadedness, chest pain, or shortness of breath. Significant history of HTN, and etoh abuse. Reported medication compliance. Reported history of GI bleed in 12/2016 and was found to have gastritis. Patient UTD on colonoscopy. No other complaints on presentation.     For the full HPI please refer to the History & Physical from this admission.    Hospital Course By Problem with Pertinent Findings     Acute blood loss anemia 2/2 lower GI bleed  - GI consulted; EGD and Colonoscopy as above; likely source is diverticular bleed. Resolved at time of colonoscopy.  - H/H stable prior to discharge  - Continue Protonix daily  - has Hx of alcohol abuse initially given octreotide and rocephin for suspect liver disease but since discontinued with no cirrhosis or ascites detected on ultrasound and no esophageal varices noted on EGD.   - will f/u with GI at [facility] per patient request.      AVNRT, resolved  - Supraventricular tachycardia overnight, MET called, EKG notable for AVNRT, responded to 6mg adenosine  - Echocardiogram with diastolic dysfunction, and ejection fraction >60%, mild aortic regurgitation. No wall motion abnormalities.   - resume home Cardizem on discharge (held in setting of GI bleed)     Hyperkalemia, resolved  - 5.6 on admission  EKG wnl  received calcium gluconate, insulin, albuterol to shift  - resolved     Thrombocytopenia  - 131 on admission  previously worked up with negative HIV and hep panel  - continue to monitor  no evidence of easy bruising  - Hx of alcohol abuse, likely contributing  - U/S abdomen with liver cysts, largest cyst stable from prior, consistent with simple cysts.      Alcohol Abuse  - previously used to drink  unquantifiable amount of alcohol  - currently states he drinks 3 beers/day  - give thiamine, MV, folate on discharge  - counseled on cessation     GERD  - continue protonix     Hypertension  - resumed home Cardizem on discharge     HLD  - on pravastatin  will continue  - lipid panel with HDL 36, total cholesterol 135.     Elevated bilirubin  - likely 2/2 blood loss  direct bilirubin WNL  - U/S abdomen without biliary pathology     Acute blood loss anemia with VICK 2/2 chronic blood loss.   - was not taking iron/colace as outpatient  - will restart at [facility]    B12 Deficiency  - B12 level 234  - received IM injection x2 while inpatient.   - supplemental oral B12 replacement on discharge. Will phone into pharmacy of choice.      BPH  - on flomax and finasteride; continue    Discharge Medications        Medication List      START taking these medications    docusate sodium 100 MG capsule  Commonly known as:  COLACE  Take 1 capsule (100 mg total) by mouth 2 (two) times daily.     ferrous sulfate 325 mg (65 mg iron) Tab tablet  Take 1 tablet (325 mg total) by mouth 2 (two) times daily.     folic acid 1 MG tablet  Commonly known as:  FOLVITE  Take 1 tablet (1 mg total) by mouth once daily.     multivitamin tablet  Commonly known as:  THERAGRAN  Take 1 tablet by mouth once daily.     thiamine 100 MG tablet  Take 1 tablet (100 mg total) by mouth once daily.        CHANGE how you take these medications    LOTEMAX 0.5 % [facility]  Generic drug:  loteprednol etabonate  What changed:  Another medication with the same name was removed. Continue taking this medication, and follow the directions you see here.        CONTINUE taking these medications    cyclobenzaprine 5 MG tablet  Commonly known as:  FLEXERIL     diltiaZEM 240 MG 24 hr capsule  Commonly known as:  CARDIZEM CD  Take 1 capsule (240 mg total) by mouth once daily.     finasteride 5 mg tablet  Commonly known as:  PROSCAR  TAKE 1 TABLET BY MOUTH EVERY DAY     omeprazole  40 MG capsule  Commonly known as:  PriLOSEC  Take 1 capsule (40 mg total) by mouth once daily.     pravastatin 80 MG tablet  Commonly known as:  PRAVACHOL  TAKE 1 TABLET (80 MG TOTAL) BY MOUTH ONCE DAILY.     tamsulosin 0.4 mg Cp24  Commonly known as:  FLOMAX  TAKE 1 CAPSULE BY MOUTH NIGHTLY     traZODone 50 MG tablet  Commonly known as:  DESYREL  Take 1 tablet (50 mg total) by mouth nightly as needed for Insomnia.        STOP taking these medications    aspirin 81 MG EC tablet  Commonly known as:  ECOTRIN           Where to Get Your Medications      You can get these medications from any pharmacy    Bring a paper prescription for each of these medications  · diltiaZEM 240 MG 24 hr capsule  · docusate sodium 100 MG capsule  · ferrous sulfate 325 mg (65 mg iron) Tab tablet  · folic acid 1 MG tablet  · multivitamin tablet  · thiamine 100 MG tablet         Discharge Information:   Diet:  regular    Physical Activity:  Ad david    Instructions:  1. Take all medications as prescribed  2. Keep all follow-up appointments  3. Return to the hospital or call your primary care physicians if any worsening symptoms such as recurrence of bright red blood per rectum or abdominal pain occur.    Follow-Up Appointments:  Follow-up Information     Lindsey Du MD.    Specialty:  Family Medicine  Contact information:  [address]  [address]  [facility] [address] [phone]             Bala Arreola MD.    Specialty:  Gastroenterology  Contact information:  [address]  [facility] [id number] [phone]                     Tee Mejia  [facility], -

## 2017-11-03 ENCOUNTER — TELEPHONE (OUTPATIENT)
Dept: FAMILY MEDICINE | Facility: CLINIC | Age: 82
End: 2017-11-03

## 2017-11-03 RX ORDER — DOCUSATE SODIUM 100 MG/1
100 CAPSULE, LIQUID FILLED ORAL 2 TIMES DAILY
Qty: 90 CAPSULE | Refills: 5 | Status: SHIPPED | OUTPATIENT
Start: 2017-11-03 | End: 2020-05-15 | Stop reason: SDUPTHER

## 2017-11-03 NOTE — TELEPHONE ENCOUNTER
Called patient's wife to inform her that the prescription has been sent to the pharmacy. She verbalized understanding.

## 2017-11-03 NOTE — TELEPHONE ENCOUNTER
----- Message from Azalia Sparks sent at 11/3/2017  2:53 PM CDT -----  Contact: [phone]/Wife Ada  Patient requesting to speak with you regarding a refill for docusate sodium (COLACE) 100 MG capsule - 90 day supply. Please call when ready for .

## 2017-11-03 NOTE — TELEPHONE ENCOUNTER
Called patient to schedule follow up. Patients wife said she will give us a call back when she founds out her daughter's availability.

## 2017-11-03 NOTE — TELEPHONE ENCOUNTER
Spoke with patients wife. She stated the patient has a prescription for Colace. He is to take it twice dails but it is only written for 30 pills. He has refills but she has no transportation to go back and forth to the pharmacy. She would like to know if a 90 days supply can be called in. Please advise.

## 2018-01-26 DIAGNOSIS — I10 BENIGN ESSENTIAL HYPERTENSION: ICD-10-CM

## 2018-01-26 RX ORDER — DILTIAZEM HYDROCHLORIDE 240 MG/1
240 CAPSULE, COATED, EXTENDED RELEASE ORAL DAILY
Qty: 90 CAPSULE | Refills: 3 | Status: SHIPPED | OUTPATIENT
Start: 2018-01-26 | End: 2018-01-26 | Stop reason: SDUPTHER

## 2018-01-26 RX ORDER — DILTIAZEM HYDROCHLORIDE 240 MG/1
240 CAPSULE, COATED, EXTENDED RELEASE ORAL DAILY
Qty: 90 CAPSULE | Refills: 3 | Status: SHIPPED | OUTPATIENT
Start: 2018-01-26 | End: 2018-12-12 | Stop reason: SDUPTHER

## 2018-02-20 ENCOUNTER — TELEPHONE (OUTPATIENT)
Dept: FAMILY MEDICINE | Facility: CLINIC | Age: 83
End: 2018-02-20

## 2018-02-28 ENCOUNTER — TELEPHONE (OUTPATIENT)
Dept: FAMILY MEDICINE | Facility: CLINIC | Age: 83
End: 2018-02-28

## 2018-03-07 DIAGNOSIS — N40.0 BENIGN NON-NODULAR PROSTATIC HYPERPLASIA WITHOUT LOWER URINARY TRACT SYMPTOMS: ICD-10-CM

## 2018-03-07 RX ORDER — FINASTERIDE 5 MG/1
TABLET, FILM COATED ORAL
Qty: 90 TABLET | Refills: 3 | Status: SHIPPED | OUTPATIENT
Start: 2018-03-07 | End: 2019-03-10 | Stop reason: SDUPTHER

## 2018-03-07 RX ORDER — TAMSULOSIN HYDROCHLORIDE 0.4 MG/1
CAPSULE ORAL
Qty: 90 CAPSULE | Refills: 3 | Status: SHIPPED | OUTPATIENT
Start: 2018-03-07 | End: 2019-03-10 | Stop reason: SDUPTHER

## 2018-03-21 ENCOUNTER — TELEPHONE (OUTPATIENT)
Dept: FAMILY MEDICINE | Facility: CLINIC | Age: 83
End: 2018-03-21

## 2018-03-23 ENCOUNTER — OFFICE VISIT (OUTPATIENT)
Dept: FAMILY MEDICINE | Facility: CLINIC | Age: 83
End: 2018-03-23
Payer: MEDICARE

## 2018-03-23 VITALS
WEIGHT: 156.06 LBS | HEART RATE: 70 BPM | DIASTOLIC BLOOD PRESSURE: 76 MMHG | BODY MASS INDEX: 22.34 KG/M2 | SYSTOLIC BLOOD PRESSURE: 148 MMHG | OXYGEN SATURATION: 98 % | HEIGHT: 70 IN

## 2018-03-23 DIAGNOSIS — R41.3 MEMORY DIFFICULTIES: ICD-10-CM

## 2018-03-23 DIAGNOSIS — Z79.899 MEDICATION MANAGEMENT: ICD-10-CM

## 2018-03-23 DIAGNOSIS — K59.00 CONSTIPATION, UNSPECIFIED CONSTIPATION TYPE: ICD-10-CM

## 2018-03-23 DIAGNOSIS — R42 LIGHTHEADEDNESS: ICD-10-CM

## 2018-03-23 DIAGNOSIS — I47.10 SVT (SUPRAVENTRICULAR TACHYCARDIA): ICD-10-CM

## 2018-03-23 DIAGNOSIS — Z00.00 ROUTINE GENERAL MEDICAL EXAMINATION AT A HEALTH CARE FACILITY: Primary | ICD-10-CM

## 2018-03-23 DIAGNOSIS — I10 BENIGN ESSENTIAL HYPERTENSION: ICD-10-CM

## 2018-03-23 DIAGNOSIS — E78.5 HYPERLIPIDEMIA, UNSPECIFIED HYPERLIPIDEMIA TYPE: ICD-10-CM

## 2018-03-23 DIAGNOSIS — D50.0 IRON DEFICIENCY ANEMIA DUE TO CHRONIC BLOOD LOSS: ICD-10-CM

## 2018-03-23 DIAGNOSIS — Z87.19 HISTORY OF GI BLEED: ICD-10-CM

## 2018-03-23 DIAGNOSIS — D69.6 THROMBOCYTOPENIA: ICD-10-CM

## 2018-03-23 DIAGNOSIS — D51.9 ANEMIA DUE TO VITAMIN B12 DEFICIENCY, UNSPECIFIED B12 DEFICIENCY TYPE: ICD-10-CM

## 2018-03-23 DIAGNOSIS — E78.2 MIXED HYPERLIPIDEMIA: ICD-10-CM

## 2018-03-23 DIAGNOSIS — G47.00 INSOMNIA, UNSPECIFIED TYPE: ICD-10-CM

## 2018-03-23 DIAGNOSIS — E55.9 VITAMIN D DEFICIENCY: ICD-10-CM

## 2018-03-23 DIAGNOSIS — K21.00 GASTROESOPHAGEAL REFLUX DISEASE WITH ESOPHAGITIS: ICD-10-CM

## 2018-03-23 PROBLEM — K92.1 GASTROINTESTINAL HEMORRHAGE WITH MELENA: Status: RESOLVED | Noted: 2017-10-29 | Resolved: 2018-03-23

## 2018-03-23 PROBLEM — D62 ACUTE BLOOD LOSS ANEMIA: Status: RESOLVED | Noted: 2017-10-29 | Resolved: 2018-03-23

## 2018-03-23 PROBLEM — K92.1 MELENA: Status: RESOLVED | Noted: 2017-10-31 | Resolved: 2018-03-23

## 2018-03-23 PROCEDURE — 99397 PER PM REEVAL EST PAT 65+ YR: CPT | Mod: S$PBB,,, | Performed by: FAMILY MEDICINE

## 2018-03-23 PROCEDURE — 99999 PR PBB SHADOW E&M-EST. PATIENT-LVL III: CPT | Mod: PBBFAC,,, | Performed by: FAMILY MEDICINE

## 2018-03-23 PROCEDURE — 99213 OFFICE O/P EST LOW 20 MIN: CPT | Mod: PBBFAC,PO | Performed by: FAMILY MEDICINE

## 2018-03-23 RX ORDER — PRAVASTATIN SODIUM 80 MG/1
TABLET ORAL
Qty: 90 TABLET | Refills: 5 | Status: SHIPPED | OUTPATIENT
Start: 2018-03-23 | End: 2019-06-10 | Stop reason: SDUPTHER

## 2018-03-23 NOTE — PROGRESS NOTES
Office Visit    Patient Name: Toby Green    : 1930  MRN: [id number]    Subjective:  Toby is a 88 y.o. male who presents today for:    Annual Exam    Toby Green presents today for annual wellness exam and monitoring of chronic conditions including: hypertension, hyperlipidemia, GERD with history of GI bleed and some resulting iron deficiency anemia along with history of B12 deficiency associated with anemia, history of excessive alcohol consumption.      They have been feeling overall well. Has been gardening. He fell once and hurt his knee. But otherwise no falls and no head injury.  He reports some occasional lightheadedness that is worse first thing in the morning, but this seems to pass fairly reliably around mid morning after he has eaten breakfast.     General lifestyle habits are as follows:  Diet is described as fair-- daughter who tries to provide healthy meals, exercise is described as fair-- walks about 10 minutes on a regular daily basis, sleep is described improved on Trazodone. Weight is stable over the last year.  He has pretty much quit drinking alcohol since his episode of GI bleed in 2017.  He does still have an occasional beer.     Immunizations: Prevnar 13 2016, Pneumovax 23 10/18/2011     Screening Tests: EGD 10/30/2017 unremarkable except for mild non-erosive duodenitis,  Colonoscopy 10/31/2017 with no active bleeding-- repeat prn issues only      Eye/Dental: up to date with eye exams and dental is over due              Past Medical History  Past Medical History:   Diagnosis Date    Benign essential hypertension     Borderline glaucoma, open angle with borderline findings 10/19/2012    Constipation 2014    Erectile dysfunction     GERD (gastroesophageal reflux disease)     History of GI bleed 2016    Hyperlipidemia     Iron deficiency anemia 2016    Nuclear sclerosis 10/19/2012       Past Surgical History  Past Surgical History:    Procedure Laterality Date    CATARACT EXTRACTION W/  INTRAOCULAR LENS IMPLANT  12/5/12    OD w/     COLONOSCOPY N/A 10/31/2017    Procedure: COLONOSCOPY;  Surgeon: Oswaldo Zee MD;  [facility];  Service: Endoscopy;  Laterality: N/A;    lump in back removed  2009    lump removal      head    PARS PLANA VITRECTOMY  12/11/12    OD w/ dr. tavarez    RETINAL DETACHMENT SURGERY  3/26/13    Right eye    TONSILLECTOMY         Family History  Family History   Problem Relation Age of Onset    Cancer Mother     Diabetes Mother     Hypertension Father     Stroke Father     Cancer Sister     Cancer Brother     Cataracts Paternal Grandmother     Glaucoma Paternal Grandmother     Hypertension Paternal Grandmother     Amblyopia Neg Hx     Blindness Neg Hx     Macular degeneration Neg Hx     Retinal detachment Neg Hx     Strabismus Neg Hx     Thyroid disease Neg Hx        Social History  Social History     Social History    Marital status:      Spouse name: N/A    Number of children: N/A    Years of education: N/A     Occupational History    Not on file.     Social History Main Topics    Smoking status: Former Smoker    Smokeless tobacco: Never Used    Alcohol use 0.5 oz/week     1 Standard drinks or equivalent per week      Comment: soically    Drug use: No    Sexual activity: Not on file     Other Topics Concern    Not on file     Social History Narrative    Wife -Ada       Current Medications  Medications reviewed and updated.     Allergies   Review of patient's allergies indicates:   Allergen Reactions    Aleve  [naproxen sodium]      Other reaction(s): stomch bleeding    Naproxen      Other reaction(s): bleeding    Ticlid  [ticlopidine]      Other reaction(s): extreme bleeding       Review of Systems (Pertinent positives)  Review of Systems   Constitutional: Negative for unexpected weight change.   HENT: Positive for hearing loss.    Eyes: Negative for visual  "disturbance.   Respiratory: Negative for shortness of breath.    Cardiovascular: Negative for chest pain and leg swelling.   Gastrointestinal: Positive for constipation.   Genitourinary: Negative for difficulty urinating and dysuria.   Musculoskeletal: Negative for joint swelling.   Neurological: Positive for light-headedness. Negative for dizziness. Facial asymmetry: mild, self limited.   Psychiatric/Behavioral: Negative for dysphoric mood and sleep disturbance. The patient is not nervous/anxious.        BP (!) 148/76   Pulse 70   Ht 5' 9.5" (1.765 m)   Wt 70.8 kg (156 lb 1.4 oz)   SpO2 98%   BMI 22.72 kg/m²     Physical Exam   Constitutional: He is oriented to person, place, and time. He appears well-developed and well-nourished. No distress.   HENT:   Head: Normocephalic and atraumatic.   Right Ear: External ear normal.   Left Ear: External ear normal.   Nose: Nose normal.   Mouth/Throat: Oropharynx is clear and moist. No oropharyngeal exudate.   Eyes: Conjunctivae are normal. No scleral icterus.   Neck: No tracheal deviation present. No thyromegaly present.   Cardiovascular: Normal rate, regular rhythm and intact distal pulses.    Murmur (with radiation to bilateral carotids) heard.   Systolic murmur is present with a grade of 3/6   Pulmonary/Chest: Effort normal and breath sounds normal.   Abdominal: Soft. Bowel sounds are normal. He exhibits no distension and no mass. There is no tenderness. No hernia.   Musculoskeletal: Normal range of motion.   Lymphadenopathy:     He has no cervical adenopathy.   Neurological: He is alert and oriented to person, place, and time. He has normal reflexes.   Skin: Skin is warm and dry. No rash noted.   Psychiatric: He has a normal mood and affect.   Vitals reviewed.        Assessment/Plan:  Toby Green is a 88 y.o. male who presents today for :    Toby was seen today for annual exam.    Diagnoses and all orders for this visit:    Routine general medical examination " at a health care facility  Comments:  Health maintenance reviewed and labs ordered.  Continue regular exercise, eye and dental exams.  Orders:  -     Comprehensive metabolic panel; Future  -     Lipid panel; Future  -     CBC auto differential; Future  -     TSH; Future  -     Vitamin D; Future  -     Vitamin B12; Future    BMI 22.0-22.9, adult    Mixed hyperlipidemia  -     Comprehensive metabolic panel; Future  -     Lipid panel; Future    Benign essential hypertension  -     Comprehensive metabolic panel; Future  -     Lipid panel; Future  -     CBC auto differential; Future  -     TSH; Future    Gastroesophageal reflux disease with esophagitis    History of GI bleed    Constipation, unspecified constipation type    Iron deficiency anemia due to chronic blood loss  -     Iron and TIBC; Future  -     Ferritin; Future    Anemia due to vitamin B12 deficiency, unspecified B12 deficiency type  -     CBC auto differential; Future  -     Vitamin B12; Future  -     Iron and TIBC; Future  -     Ferritin; Future    Thrombocytopenia    Vitamin D deficiency  -     Vitamin D; Future    SVT (supraventricular tachycardia)  Comments:  stable on diltiazem      Medication management  -     Comprehensive metabolic panel; Future  -     Lipid panel; Future  -     CBC auto differential; Future  -     TSH; Future  -     Vitamin D; Future    Insomnia, unspecified type    Hyperlipidemia, unspecified hyperlipidemia type  -     pravastatin (PRAVACHOL) 80 MG tablet; TAKE 1 TABLET (80 MG TOTAL) BY MOUTH ONCE DAILY.    Lightheadedness  Comments:  Blood work to rule out underlying causes such as worsening anemia& will likely order echo for eval of murmur in setting of lightheadedness if labs unremarkable    Memory difficulties  Comments:  After medical eval (labs/  Echo complete), his daughter plans to bring him in for a Mini-Mental Status exam.  No significant safety concerns expressed            ICD-10-CM ICD-9-CM    1. Routine general  medical examination at a health care facility Z00.00 V70.0 Comprehensive metabolic panel      Lipid panel      CBC auto differential      TSH      Vitamin D      Vitamin B12    Health maintenance reviewed and labs ordered.  Continue regular exercise, eye and dental exams.   2. BMI 22.0-22.9, adult Z68.22 V85.1    3. Mixed hyperlipidemia E78.2 272.2 Comprehensive metabolic panel      Lipid panel   4. Benign essential hypertension I10 401.1 Comprehensive metabolic panel      Lipid panel      CBC auto differential      TSH   5. Gastroesophageal reflux disease with esophagitis K21.0 530.11    6. History of GI bleed Z87.19 V12.79    7. Constipation, unspecified constipation type K59.00 564.00    8. Iron deficiency anemia due to chronic blood loss D50.0 280.0 Iron and TIBC      Ferritin   9. Anemia due to vitamin B12 deficiency, unspecified B12 deficiency type D51.9 281.1 CBC auto differential      Vitamin B12      Iron and TIBC      Ferritin   10. Thrombocytopenia D69.6 287.5    11. Vitamin D deficiency E55.9 268.9 Vitamin D   12. SVT (supraventricular tachycardia) I47.1 427.89     stable on diltiazem     13. Medication management Z79.899 V58.69 Comprehensive metabolic panel      Lipid panel      CBC auto differential      TSH      Vitamin D   14. Insomnia, unspecified type G47.00 780.52    15. Hyperlipidemia, unspecified hyperlipidemia type E78.5 272.4 pravastatin (PRAVACHOL) 80 MG tablet   16. Lightheadedness R42 780.4     Blood work to rule out underlying causes such as worsening anemia& will likely order echo for eval of murmur in setting of lightheadedness if labs unremarkable   17. Memory difficulties R41.3 780.93     After medical eval (labs/  Echo complete), his daughter plans to bring him in for a Mini-Mental Status exam.  No significant safety concerns expressed       There are no Patient Instructions on file for this visit.      Follow-up for return as needed for new concerns.

## 2018-06-30 ENCOUNTER — EXTERNAL CHRONIC CARE MANAGEMENT (OUTPATIENT)
Dept: PRIMARY CARE CLINIC | Facility: CLINIC | Age: 83
End: 2018-06-30
Payer: MEDICARE

## 2018-06-30 PROCEDURE — 99490 CHRNC CARE MGMT STAFF 1ST 20: CPT | Mod: S$PBB,,, | Performed by: FAMILY MEDICINE

## 2018-06-30 PROCEDURE — 99490 CHRNC CARE MGMT STAFF 1ST 20: CPT | Mod: PBBFAC,PO | Performed by: FAMILY MEDICINE

## 2018-07-31 ENCOUNTER — EXTERNAL CHRONIC CARE MANAGEMENT (OUTPATIENT)
Dept: PRIMARY CARE CLINIC | Facility: CLINIC | Age: 83
End: 2018-07-31
Payer: MEDICARE

## 2018-07-31 PROCEDURE — 99490 CHRNC CARE MGMT STAFF 1ST 20: CPT | Mod: PBBFAC,PO | Performed by: FAMILY MEDICINE

## 2018-07-31 PROCEDURE — 99490 CHRNC CARE MGMT STAFF 1ST 20: CPT | Mod: S$PBB,,, | Performed by: FAMILY MEDICINE

## 2018-08-03 ENCOUNTER — TELEPHONE (OUTPATIENT)
Dept: FAMILY MEDICINE | Facility: CLINIC | Age: 83
End: 2018-08-03

## 2018-08-03 NOTE — TELEPHONE ENCOUNTER
----- Message from Kadi Christianson sent at 8/3/2018  4:07 PM CDT -----  Contact: [phone] vito david daughter  Patient daughter requested to speak with the nurse about the patient possibly having alzheimer. Please call and advise.

## 2018-08-31 ENCOUNTER — EXTERNAL CHRONIC CARE MANAGEMENT (OUTPATIENT)
Dept: PRIMARY CARE CLINIC | Facility: CLINIC | Age: 83
End: 2018-08-31
Payer: MEDICARE

## 2018-08-31 PROCEDURE — 99490 CHRNC CARE MGMT STAFF 1ST 20: CPT | Mod: PBBFAC,PO | Performed by: FAMILY MEDICINE

## 2018-08-31 PROCEDURE — 99490 CHRNC CARE MGMT STAFF 1ST 20: CPT | Mod: S$PBB,,, | Performed by: FAMILY MEDICINE

## 2018-09-01 DIAGNOSIS — F51.01 PRIMARY INSOMNIA: ICD-10-CM

## 2018-09-03 RX ORDER — TRAZODONE HYDROCHLORIDE 50 MG/1
TABLET ORAL
Qty: 90 TABLET | Refills: 3 | Status: SHIPPED | OUTPATIENT
Start: 2018-09-03 | End: 2018-10-22 | Stop reason: ALTCHOICE

## 2018-09-10 ENCOUNTER — TELEPHONE (OUTPATIENT)
Dept: FAMILY MEDICINE | Facility: CLINIC | Age: 83
End: 2018-09-10

## 2018-09-10 NOTE — TELEPHONE ENCOUNTER
Called patient to reschedule appointment.  Spoke with patient's wife.  Was notified that I should speak with their daughter to schedule appointment because the appointment date must match her schedule.  Called patient's daughter, but was unable to get a hold of her.  Patient's wife, stated she will try to reach and give me a call back.

## 2018-09-10 NOTE — TELEPHONE ENCOUNTER
----- Message from Sade Main sent at 9/10/2018  9:13 AM CDT -----  Contact: self / [phone]   Patient is requesting a call back regarding, his appointment . Please advise

## 2018-09-30 ENCOUNTER — EXTERNAL CHRONIC CARE MANAGEMENT (OUTPATIENT)
Dept: PRIMARY CARE CLINIC | Facility: CLINIC | Age: 83
End: 2018-09-30
Payer: MEDICARE

## 2018-09-30 PROCEDURE — 99490 CHRNC CARE MGMT STAFF 1ST 20: CPT | Mod: PBBFAC,PO | Performed by: FAMILY MEDICINE

## 2018-09-30 PROCEDURE — 99490 CHRNC CARE MGMT STAFF 1ST 20: CPT | Mod: S$PBB,,, | Performed by: FAMILY MEDICINE

## 2018-10-22 ENCOUNTER — OFFICE VISIT (OUTPATIENT)
Dept: FAMILY MEDICINE | Facility: CLINIC | Age: 83
End: 2018-10-22
Payer: MEDICARE

## 2018-10-22 ENCOUNTER — LAB VISIT (OUTPATIENT)
Dept: LAB | Facility: HOSPITAL | Age: 83
End: 2018-10-22
Attending: FAMILY MEDICINE
Payer: MEDICARE

## 2018-10-22 VITALS
BODY MASS INDEX: 22.91 KG/M2 | OXYGEN SATURATION: 98 % | HEIGHT: 70 IN | WEIGHT: 160.06 LBS | HEART RATE: 79 BPM | DIASTOLIC BLOOD PRESSURE: 86 MMHG | SYSTOLIC BLOOD PRESSURE: 135 MMHG

## 2018-10-22 DIAGNOSIS — K59.00 CONSTIPATION, UNSPECIFIED CONSTIPATION TYPE: ICD-10-CM

## 2018-10-22 DIAGNOSIS — F10.11 HISTORY OF ALCOHOL ABUSE: ICD-10-CM

## 2018-10-22 DIAGNOSIS — Z23 NEEDS FLU SHOT: ICD-10-CM

## 2018-10-22 DIAGNOSIS — E78.2 MIXED HYPERLIPIDEMIA: ICD-10-CM

## 2018-10-22 DIAGNOSIS — Z86.69 HISTORY OF RETINAL DETACHMENT: ICD-10-CM

## 2018-10-22 DIAGNOSIS — N40.0 BENIGN PROSTATIC HYPERPLASIA WITHOUT LOWER URINARY TRACT SYMPTOMS: ICD-10-CM

## 2018-10-22 DIAGNOSIS — H25.13 NUCLEAR SCLEROSIS OF BOTH EYES: ICD-10-CM

## 2018-10-22 DIAGNOSIS — I10 BENIGN ESSENTIAL HYPERTENSION: Primary | ICD-10-CM

## 2018-10-22 DIAGNOSIS — D51.9 ANEMIA DUE TO VITAMIN B12 DEFICIENCY, UNSPECIFIED B12 DEFICIENCY TYPE: ICD-10-CM

## 2018-10-22 DIAGNOSIS — R41.89 COGNITIVE IMPAIRMENT: ICD-10-CM

## 2018-10-22 DIAGNOSIS — E55.9 VITAMIN D DEFICIENCY: ICD-10-CM

## 2018-10-22 DIAGNOSIS — Z79.899 MEDICATION MANAGEMENT: ICD-10-CM

## 2018-10-22 DIAGNOSIS — I10 BENIGN ESSENTIAL HYPERTENSION: ICD-10-CM

## 2018-10-22 DIAGNOSIS — I47.10 SVT (SUPRAVENTRICULAR TACHYCARDIA): ICD-10-CM

## 2018-10-22 DIAGNOSIS — D69.6 THROMBOCYTOPENIA: ICD-10-CM

## 2018-10-22 DIAGNOSIS — Z87.19 HISTORY OF GI BLEED: ICD-10-CM

## 2018-10-22 DIAGNOSIS — H91.90 HEARING LOSS, UNSPECIFIED HEARING LOSS TYPE, UNSPECIFIED LATERALITY: ICD-10-CM

## 2018-10-22 DIAGNOSIS — D50.0 IRON DEFICIENCY ANEMIA DUE TO CHRONIC BLOOD LOSS: ICD-10-CM

## 2018-10-22 DIAGNOSIS — R35.0 URINARY FREQUENCY: ICD-10-CM

## 2018-10-22 DIAGNOSIS — K29.80 DUODENITIS WITHOUT BLEEDING: ICD-10-CM

## 2018-10-22 DIAGNOSIS — G47.00 INSOMNIA, UNSPECIFIED TYPE: ICD-10-CM

## 2018-10-22 DIAGNOSIS — K21.00 GASTROESOPHAGEAL REFLUX DISEASE WITH ESOPHAGITIS: ICD-10-CM

## 2018-10-22 LAB
ALBUMIN SERPL BCP-MCNC: 3.6 G/DL
ALP SERPL-CCNC: 80 U/L
ALT SERPL W/O P-5'-P-CCNC: 14 U/L
ANION GAP SERPL CALC-SCNC: 9 MMOL/L
AST SERPL-CCNC: 22 U/L
BASOPHILS # BLD AUTO: 0.03 K/UL
BASOPHILS NFR BLD: 0.5 %
BILIRUB SERPL-MCNC: 0.7 MG/DL
BUN SERPL-MCNC: 11 MG/DL
CALCIUM SERPL-MCNC: 9.5 MG/DL
CHLORIDE SERPL-SCNC: 104 MMOL/L
CHOLEST SERPL-MCNC: 210 MG/DL
CHOLEST/HDLC SERPL: 4 {RATIO}
CO2 SERPL-SCNC: 27 MMOL/L
CREAT SERPL-MCNC: 1.3 MG/DL
DIFFERENTIAL METHOD: ABNORMAL
EOSINOPHIL # BLD AUTO: 0.2 K/UL
EOSINOPHIL NFR BLD: 3.3 %
ERYTHROCYTE [DISTWIDTH] IN BLOOD BY AUTOMATED COUNT: 13.8 %
EST. GFR  (AFRICAN AMERICAN): 56 ML/MIN/1.73 M^2
EST. GFR  (NON AFRICAN AMERICAN): 49 ML/MIN/1.73 M^2
ESTIMATED AVG GLUCOSE: 108 MG/DL
FERRITIN SERPL-MCNC: 48 NG/ML
GLUCOSE SERPL-MCNC: 98 MG/DL
HBA1C MFR BLD HPLC: 5.4 %
HCT VFR BLD AUTO: 48.1 %
HDLC SERPL-MCNC: 52 MG/DL
HDLC SERPL: 24.8 %
HGB BLD-MCNC: 15.7 G/DL
IRON SERPL-MCNC: 75 UG/DL
LDLC SERPL CALC-MCNC: 137.4 MG/DL
LYMPHOCYTES # BLD AUTO: 1.4 K/UL
LYMPHOCYTES NFR BLD: 21 %
MCH RBC QN AUTO: 29.8 PG
MCHC RBC AUTO-ENTMCNC: 32.6 G/DL
MCV RBC AUTO: 91 FL
MONOCYTES # BLD AUTO: 0.6 K/UL
MONOCYTES NFR BLD: 9 %
NEUTROPHILS # BLD AUTO: 4.3 K/UL
NEUTROPHILS NFR BLD: 66 %
NONHDLC SERPL-MCNC: 158 MG/DL
PLATELET # BLD AUTO: 139 K/UL
PMV BLD AUTO: 11.9 FL
POTASSIUM SERPL-SCNC: 4.1 MMOL/L
PROT SERPL-MCNC: 8.3 G/DL
RBC # BLD AUTO: 5.26 M/UL
SATURATED IRON: 21 %
SODIUM SERPL-SCNC: 140 MMOL/L
TOTAL IRON BINDING CAPACITY: 352 UG/DL
TRANSFERRIN SERPL-MCNC: 238 MG/DL
TRIGL SERPL-MCNC: 103 MG/DL
TSH SERPL DL<=0.005 MIU/L-ACNC: 0.41 UIU/ML
WBC # BLD AUTO: 6.44 K/UL

## 2018-10-22 PROCEDURE — 83540 ASSAY OF IRON: CPT

## 2018-10-22 PROCEDURE — 99214 OFFICE O/P EST MOD 30 MIN: CPT | Mod: S$PBB,,, | Performed by: FAMILY MEDICINE

## 2018-10-22 PROCEDURE — 80053 COMPREHEN METABOLIC PANEL: CPT

## 2018-10-22 PROCEDURE — 82607 VITAMIN B-12: CPT

## 2018-10-22 PROCEDURE — 99213 OFFICE O/P EST LOW 20 MIN: CPT | Mod: PBBFAC,PO | Performed by: FAMILY MEDICINE

## 2018-10-22 PROCEDURE — 84443 ASSAY THYROID STIM HORMONE: CPT

## 2018-10-22 PROCEDURE — 80061 LIPID PANEL: CPT

## 2018-10-22 PROCEDURE — 99999 PR PBB SHADOW E&M-EST. PATIENT-LVL III: CPT | Mod: PBBFAC,,, | Performed by: FAMILY MEDICINE

## 2018-10-22 PROCEDURE — 90662 IIV NO PRSV INCREASED AG IM: CPT | Mod: PBBFAC,PO

## 2018-10-22 PROCEDURE — 36415 COLL VENOUS BLD VENIPUNCTURE: CPT

## 2018-10-22 PROCEDURE — 85025 COMPLETE CBC W/AUTO DIFF WBC: CPT

## 2018-10-22 PROCEDURE — 82728 ASSAY OF FERRITIN: CPT

## 2018-10-22 PROCEDURE — 82306 VITAMIN D 25 HYDROXY: CPT

## 2018-10-22 PROCEDURE — 83036 HEMOGLOBIN GLYCOSYLATED A1C: CPT

## 2018-10-22 RX ORDER — DONEPEZIL HYDROCHLORIDE 5 MG/1
5 TABLET, FILM COATED ORAL NIGHTLY
Qty: 30 TABLET | Refills: 11 | Status: SHIPPED | OUTPATIENT
Start: 2018-10-22 | End: 2019-08-08 | Stop reason: SDUPTHER

## 2018-10-22 RX ORDER — MIRTAZAPINE 15 MG/1
15 TABLET, FILM COATED ORAL NIGHTLY
Qty: 30 TABLET | Refills: 11 | Status: SHIPPED | OUTPATIENT
Start: 2018-10-22 | End: 2019-11-12 | Stop reason: SDUPTHER

## 2018-10-22 NOTE — PROGRESS NOTES
" Office Visit    Patient Name: Toby Green    : 1930  MRN: [id number]    Subjective:  Toby is a 88 y.o. male who presents today for:    Annual Exam (flu shot)    Toby Green is an 87 yo patient of mine last seen for annual wellness exam 3/23/2018 and here for 6 month follow up of chronic conditions including: hypertension, hyperlipidemia, GERD with history of GI bleed and some resulting iron deficiency anemia along with history of B12 deficiency associated with anemia, and history of excessive alcohol consumption.  Has not had labs since 10/30/2017.    His daughter and wife are with him today and have concerns regarding his memory and behavior. They report increased paranoia regarding money. He feels that family members are "out to get him and his money." he is increasingly confused and asks the same questions repetitively. He is very angry at his daughter for taking the keys to his car and removing his car from the premises. He will not wear his hearing aids consistently, confounding the picture of whether or not he is confused or just can't hear. He is agitated in the evenings and has trouble getting a good night's rest. Trazodone helps some, but they are wondering about other options. He lives with his wife and often his grandson. His daughter checks in frequently.  He is independent with ADLs    He reports that he is mildly concerned about his memory. He does feel that he forgets things more easily. Physically he is feeling well and his only complaint is some mild intermittent back pain.     Memory screening questions:   Month: October  Season:    Year:     100-- can subtract a few serial 5s but not 7s    2/3 object recall at 5 minutes.     Past Medical History  Past Medical History:   Diagnosis Date    Benign essential hypertension     Borderline glaucoma, open angle with borderline findings 10/19/2012    Constipation 2014    Erectile dysfunction     GERD (gastroesophageal " reflux disease)     History of GI bleed 12/12/2016    Hyperlipidemia     Iron deficiency anemia 12/13/2016    Nuclear sclerosis 10/19/2012       Past Surgical History  Past Surgical History:   Procedure Laterality Date    CATARACT EXTRACTION W/  INTRAOCULAR LENS IMPLANT  12/5/12    OD w/     COLONOSCOPY N/A 10/31/2017    Procedure: COLONOSCOPY;  Surgeon: Oswaldo Zee MD;  Location: [facility];  Service: Endoscopy;  Laterality: N/A;    COLONOSCOPY N/A 10/31/2017    Per[facility]    COLONOSCOPY N/A 9/29/2014    Performed by David Brito MD at [facility] ENDO (4TH FLR)    CRYOSURGERY-EYE Right 12/11/2012    Performed by Felicia Tavarez MD at [facility] OR 1ST FLR    egd  N/A 6/4/2014    Performed by Hiwot Loyd [facility] at [facility] ENDO    ESOPHAGOGASTRODUODENOSCOPY (EGD) N/A 10/30/2017    Performed by Aaron Yañez MD at [facility] ENDO    ESOPHAGOGASTRODUODENOSCOPY (EGD) N/A 12/13/2016    Performed by Bala Robles MD at [facility] ENDO    INSERTION, IOL PROSTHESIS Right 12/11/2012    Performed by Felicia [facility] at [facility] OR 1ST FLR    INSERTION, IOL PROSTHESIS Right 12/5/2012    Performed by Itzel Monson MD at [facility] OR 1ST FLR    LASER ENDOSCOPIC Right 12/11/2012    Performed by Felicia [facility]arez [facility] at [facility] OR 1ST FLR    lump in back removed  2009    lump removal      head    PARS PLANA VITRECTOMY  12/11/12    OD w/ dr. tavarez    PHACOEMULSIFICATION, CATARACT Right 12/5/2012    Performed by Itzel Monson MD at [facility] OR 1ST FLR    REMOVAL, FOREIGN BODY, EYE Right 12/11/2012    Performed by Felicia [facility]arez MD at [facility] OR 1ST FLR    REPAIR, RETINAL DETACHMENT, WITH VITRECTOMY Right 3/26/2013    Performed by Felicia [facility]arez MD at [facility] OR 1ST FLR    REPAIR, RETINAL DETACHMENT, WITH VITRECTOMY Right 12/11/2012    Performed by Felicia [facility]arez MD at [facility] OR 1ST FLR    RETINAL DETACHMENT SURGERY  3/26/13    Right eye     TONSILLECTOMY         Family History  Family History   Problem Relation Age of Onset    Cancer Mother     Diabetes Mother     Hypertension Father     Stroke Father     Cancer Sister     Cancer Brother     Cataracts Paternal Grandmother     Glaucoma Paternal Grandmother     Hypertension Paternal Grandmother     Amblyopia Neg Hx     Blindness Neg Hx     Macular degeneration Neg Hx     Retinal detachment Neg Hx     Strabismus Neg Hx     Thyroid disease Neg Hx        Social History  Social History     Socioeconomic History    Marital status:      Spouse name: Not on file    Number of children: Not on file    Years of education: Not on file    Highest education level: Not on file   Social Needs    Financial resource strain: Not on file    Food insecurity - worry: Not on file    Food insecurity - inability: Not on file    Transportation needs - medical: Not on file    Transportation needs - non-medical: Not on file   Occupational History    Not on file   Tobacco Use    Smoking status: Former Smoker    Smokeless tobacco: Never Used   Substance and Sexual Activity    Alcohol use: Yes     Alcohol/week: 0.5 oz     Types: 1 Standard drinks or equivalent per week     Comment: soically    Drug use: No    Sexual activity: Not on file   Other Topics Concern    Not on file   Social History Narrative    Wife -Ada       Current Medications  Medications reviewed and updated.     Allergies   Review of patient's allergies indicates:   Allergen Reactions    Aleve  [naproxen sodium]      Other reaction(s): stomch bleeding    Naproxen      Other reaction(s): bleeding    Ticlid  [ticlopidine]      Other reaction(s): extreme bleeding       Review of Systems (Pertinent positives)  Review of Systems   Constitutional: Negative for chills and fever.   HENT: Positive for hearing loss.    Respiratory: Negative for shortness of breath.    Cardiovascular: Negative for chest pain and leg swelling.  "  Gastrointestinal: Positive for constipation (managed).   Genitourinary: Positive for frequency.   Musculoskeletal: Positive for back pain.   Neurological: Negative for dizziness and weakness.   Psychiatric/Behavioral: Positive for agitation, confusion and sleep disturbance. The patient is nervous/anxious.        /86   Pulse 79   Ht 5' 9.5" (1.765 m)   Wt 72.6 kg (160 lb 0.9 oz)   SpO2 98%   BMI 23.30 kg/m²     Physical Exam   Constitutional: He is oriented to person, place, and time. He appears well-developed and well-nourished. No distress.   Cardiovascular: Normal rate and regular rhythm.   Murmur heard.   Systolic murmur is present with a grade of 3/6.  Pulmonary/Chest: Effort normal and breath sounds normal.   Musculoskeletal: He exhibits no edema.   Neurological: He is alert and oriented to person, place, and time.   Patient is oriented to person, place, time.  He is unable to count backwards from 100 by serial sevens.  2 of 3 object recall at 5 min.  Significant confusion regarding his health history and current medication regimen.  He is severely hard of hearing and it is unclear how much this is contributing to his perceived confusion.   Psychiatric: He has a normal mood and affect. He is agitated. Cognition and memory are impaired. He expresses inappropriate judgment (poor insight into driving ability and ability to function independently ). He exhibits abnormal recent memory and abnormal remote memory.   Vitals reviewed.        Assessment/Plan:  Toby Green is a 88 y.o. male who presents today for :    Toby was seen today for annual exam.    Diagnoses and all orders for this visit:    Benign essential hypertension  Comments:  controlled on cardizem 240 mg daily  Orders:  -     Hemoglobin A1c; Future  -     Comprehensive metabolic panel; Future  -     Lipid panel; Future  -     CBC auto differential; Future  -     TSH; Future    Cognitive impairment  Comments:  suspect alzheimer's " dementia. starting Aricept, trial of Remeron to hopefully improve sleep/night time irritability, check labs/urine, formal MMSE at next visit  Orders:  -     donepezil (ARICEPT) 5 MG tablet; Take 1 tablet (5 mg total) by mouth every evening.  -     Urinalysis; Future  -     Urine culture; Future    Vitamin D deficiency    Anemia due to vitamin B12 deficiency, unspecified B12 deficiency type  -     CBC auto differential; Future  -     Vitamin B12; Future  -     Ferritin; Future  -     Iron and TIBC; Future    Thrombocytopenia  -     CBC auto differential; Future    SVT (supraventricular tachycardia)  Comments:  stable on cardizem, no symptomatic episodes  Orders:  -     Comprehensive metabolic panel; Future  -     CBC auto differential; Future  -     TSH; Future    Mixed hyperlipidemia  Comments:  continue pravastatin, check labs  Orders:  -     Comprehensive metabolic panel; Future  -     Lipid panel; Future    Nuclear sclerosis of both eyes    Iron deficiency anemia due to chronic blood loss  -     CBC auto differential; Future  -     Vitamin B12; Future  -     Ferritin; Future  -     Iron and TIBC; Future    History of retinal detachment    History of GI bleed    Gastroesophageal reflux disease with esophagitis  Comments:  stable on omeprazole     Duodenitis without bleeding    Constipation, unspecified constipation type  -     TSH; Future    Benign prostatic hyperplasia without lower urinary tract symptoms    History of alcohol abuse    Medication management  -     Hemoglobin A1c; Future  -     Comprehensive metabolic panel; Future  -     Lipid panel; Future  -     CBC auto differential; Future  -     TSH; Future  -     Vitamin D; Future  -     Vitamin B12; Future  -     Ferritin; Future  -     Iron and TIBC; Future    Needs flu shot  -     Influenza - High Dose (65+) (PF) (IM)    Insomnia, unspecified type  -     mirtazapine (REMERON) 15 MG tablet; Take 1 tablet (15 mg total) by mouth every evening. Start with  1/2 pill nightly then increase to 1 pill as needed/tolerated    Hearing loss, unspecified hearing loss type, unspecified laterality    Urinary frequency  -     Urinalysis; Future  -     Urine culture; Future            ICD-10-CM ICD-9-CM    1. Benign essential hypertension I10 401.1 Hemoglobin A1c      Comprehensive metabolic panel      Lipid panel      CBC auto differential      TSH    controlled on cardizem 240 mg daily   2. Cognitive impairment R41.89 294.9 donepezil (ARICEPT) 5 MG tablet      Urinalysis      Urine culture    suspect alzheimer's dementia. starting Aricept, trial of Remeron to hopefully improve sleep/night time irritability, check labs/urine, formal MMSE at next visit   3. Vitamin D deficiency E55.9 268.9    4. Anemia due to vitamin B12 deficiency, unspecified B12 deficiency type D51.9 281.1 CBC auto differential      Vitamin B12      Ferritin      Iron and TIBC   5. Thrombocytopenia D69.6 287.5 CBC auto differential   6. SVT (supraventricular tachycardia) I47.1 427.89 Comprehensive metabolic panel      CBC auto differential      TSH    stable on cardizem, no symptomatic episodes   7. Mixed hyperlipidemia E78.2 272.2 Comprehensive metabolic panel      Lipid panel    continue pravastatin, check labs   8. Nuclear sclerosis of both eyes H25.13 366.16    9. Iron deficiency anemia due to chronic blood loss D50.0 280.0 CBC auto differential      Vitamin B12      Ferritin      Iron and TIBC   10. History of retinal detachment Z86.69 V12.49    11. History of GI bleed Z87.19 V12.79    12. Gastroesophageal reflux disease with esophagitis K21.0 530.11     stable on omeprazole    13. Duodenitis without bleeding K29.80 535.60    14. Constipation, unspecified constipation type K59.00 564.00 TSH   15. Benign prostatic hyperplasia without lower urinary tract symptoms N40.0 600.00    16. History of alcohol abuse Z87.898 305.03    17. Medication management Z79.899 V58.69 Hemoglobin A1c      Comprehensive metabolic  panel      Lipid panel      CBC auto differential      TSH      Vitamin D      Vitamin B12      Ferritin      Iron and TIBC   18. Needs flu shot Z23 V04.81 Influenza - High Dose (65+) (PF) (IM)   19. Insomnia, unspecified type G47.00 780.52 mirtazapine (REMERON) 15 MG tablet   20. Hearing loss, unspecified hearing loss type, unspecified laterality H91.90 389.9    21. Urinary frequency R35.0 788.41 Urinalysis      Urine culture       There are no Patient Instructions on file for this visit.      Follow-up in about 3 months (around 1/22/2019) for to follow up on lab results, return as needed for new concerns.

## 2018-10-23 LAB
25(OH)D3+25(OH)D2 SERPL-MCNC: 43 NG/ML
VIT B12 SERPL-MCNC: 364 PG/ML

## 2018-10-26 ENCOUNTER — TELEPHONE (OUTPATIENT)
Dept: FAMILY MEDICINE | Facility: CLINIC | Age: 83
End: 2018-10-26

## 2018-10-26 ENCOUNTER — LAB VISIT (OUTPATIENT)
Dept: LAB | Facility: HOSPITAL | Age: 83
End: 2018-10-26
Attending: FAMILY MEDICINE
Payer: MEDICARE

## 2018-10-26 DIAGNOSIS — R35.0 URINARY FREQUENCY: ICD-10-CM

## 2018-10-26 DIAGNOSIS — R41.89 COGNITIVE IMPAIRMENT: ICD-10-CM

## 2018-10-26 LAB
BILIRUB UR QL STRIP: NEGATIVE
CLARITY UR REFRACT.AUTO: CLEAR
COLOR UR AUTO: YELLOW
GLUCOSE UR QL STRIP: NEGATIVE
HGB UR QL STRIP: NEGATIVE
KETONES UR QL STRIP: NEGATIVE
LEUKOCYTE ESTERASE UR QL STRIP: NEGATIVE
NITRITE UR QL STRIP: NEGATIVE
PH UR STRIP: 7 [PH] (ref 5–8)
PROT UR QL STRIP: NEGATIVE
SP GR UR STRIP: 1.01 (ref 1–1.03)
URN SPEC COLLECT METH UR: NORMAL
UROBILINOGEN UR STRIP-ACNC: NEGATIVE EU/DL

## 2018-10-26 PROCEDURE — 81003 URINALYSIS AUTO W/O SCOPE: CPT | Mod: PO

## 2018-10-26 PROCEDURE — 87086 URINE CULTURE/COLONY COUNT: CPT | Mod: PO

## 2018-10-28 LAB — BACTERIA UR CULT: NORMAL

## 2018-10-31 ENCOUNTER — EXTERNAL CHRONIC CARE MANAGEMENT (OUTPATIENT)
Dept: PRIMARY CARE CLINIC | Facility: CLINIC | Age: 83
End: 2018-10-31
Payer: MEDICARE

## 2018-10-31 PROCEDURE — 99490 CHRNC CARE MGMT STAFF 1ST 20: CPT | Mod: S$PBB,,, | Performed by: FAMILY MEDICINE

## 2018-10-31 PROCEDURE — 99490 CHRNC CARE MGMT STAFF 1ST 20: CPT | Mod: PBBFAC,PO | Performed by: FAMILY MEDICINE

## 2018-11-30 ENCOUNTER — EXTERNAL CHRONIC CARE MANAGEMENT (OUTPATIENT)
Dept: PRIMARY CARE CLINIC | Facility: CLINIC | Age: 83
End: 2018-11-30
Payer: MEDICARE

## 2018-11-30 PROCEDURE — 99490 CHRNC CARE MGMT STAFF 1ST 20: CPT | Mod: PBBFAC,PO | Performed by: FAMILY MEDICINE

## 2018-11-30 PROCEDURE — 99490 CHRNC CARE MGMT STAFF 1ST 20: CPT | Mod: S$PBB,,, | Performed by: FAMILY MEDICINE

## 2018-12-12 DIAGNOSIS — I10 BENIGN ESSENTIAL HYPERTENSION: ICD-10-CM

## 2018-12-12 RX ORDER — DILTIAZEM HYDROCHLORIDE 240 MG/1
240 CAPSULE, COATED, EXTENDED RELEASE ORAL DAILY
Qty: 90 CAPSULE | Refills: 3 | Status: SHIPPED | OUTPATIENT
Start: 2018-12-12 | End: 2019-12-09 | Stop reason: SDUPTHER

## 2018-12-31 ENCOUNTER — EXTERNAL CHRONIC CARE MANAGEMENT (OUTPATIENT)
Dept: PRIMARY CARE CLINIC | Facility: CLINIC | Age: 83
End: 2018-12-31
Payer: MEDICARE

## 2018-12-31 PROCEDURE — 99490 PR CHRONIC CARE MGMT, 1ST 20 MIN: ICD-10-PCS | Mod: S$PBB,,, | Performed by: FAMILY MEDICINE

## 2018-12-31 PROCEDURE — 99490 CHRNC CARE MGMT STAFF 1ST 20: CPT | Mod: S$PBB,,, | Performed by: FAMILY MEDICINE

## 2018-12-31 PROCEDURE — 99490 CHRNC CARE MGMT STAFF 1ST 20: CPT | Mod: PBBFAC,PO | Performed by: FAMILY MEDICINE

## 2019-01-21 ENCOUNTER — OFFICE VISIT (OUTPATIENT)
Dept: FAMILY MEDICINE | Facility: CLINIC | Age: 84
End: 2019-01-21
Payer: MEDICARE

## 2019-01-21 VITALS
SYSTOLIC BLOOD PRESSURE: 123 MMHG | BODY MASS INDEX: 23.04 KG/M2 | HEART RATE: 85 BPM | DIASTOLIC BLOOD PRESSURE: 79 MMHG | HEIGHT: 70 IN | WEIGHT: 160.94 LBS | OXYGEN SATURATION: 96 %

## 2019-01-21 DIAGNOSIS — Z87.19 HISTORY OF GI BLEED: ICD-10-CM

## 2019-01-21 DIAGNOSIS — I47.10 SVT (SUPRAVENTRICULAR TACHYCARDIA): ICD-10-CM

## 2019-01-21 DIAGNOSIS — D51.9 ANEMIA DUE TO VITAMIN B12 DEFICIENCY, UNSPECIFIED B12 DEFICIENCY TYPE: ICD-10-CM

## 2019-01-21 DIAGNOSIS — K59.00 CONSTIPATION, UNSPECIFIED CONSTIPATION TYPE: ICD-10-CM

## 2019-01-21 DIAGNOSIS — E78.2 MIXED HYPERLIPIDEMIA: ICD-10-CM

## 2019-01-21 DIAGNOSIS — F03.91 DEMENTIA WITH BEHAVIORAL DISTURBANCE, UNSPECIFIED DEMENTIA TYPE: Primary | ICD-10-CM

## 2019-01-21 DIAGNOSIS — E55.9 VITAMIN D DEFICIENCY: ICD-10-CM

## 2019-01-21 DIAGNOSIS — Z79.899 MEDICATION MANAGEMENT: ICD-10-CM

## 2019-01-21 DIAGNOSIS — F10.11 HISTORY OF ALCOHOL ABUSE: ICD-10-CM

## 2019-01-21 DIAGNOSIS — K21.00 GASTROESOPHAGEAL REFLUX DISEASE WITH ESOPHAGITIS: ICD-10-CM

## 2019-01-21 DIAGNOSIS — Z91.89 DRIVING SAFETY ISSUE: ICD-10-CM

## 2019-01-21 DIAGNOSIS — N40.0 BENIGN PROSTATIC HYPERPLASIA WITHOUT LOWER URINARY TRACT SYMPTOMS: ICD-10-CM

## 2019-01-21 DIAGNOSIS — I10 BENIGN ESSENTIAL HYPERTENSION: ICD-10-CM

## 2019-01-21 PROCEDURE — 99999 PR PBB SHADOW E&M-EST. PATIENT-LVL IV: CPT | Mod: PBBFAC,,, | Performed by: FAMILY MEDICINE

## 2019-01-21 PROCEDURE — 99214 PR OFFICE/OUTPT VISIT, EST, LEVL IV, 30-39 MIN: ICD-10-PCS | Mod: S$PBB,,, | Performed by: FAMILY MEDICINE

## 2019-01-21 PROCEDURE — 99214 OFFICE O/P EST MOD 30 MIN: CPT | Mod: S$PBB,,, | Performed by: FAMILY MEDICINE

## 2019-01-21 PROCEDURE — 99999 PR PBB SHADOW E&M-EST. PATIENT-LVL IV: ICD-10-PCS | Mod: PBBFAC,,, | Performed by: FAMILY MEDICINE

## 2019-01-21 PROCEDURE — 99214 OFFICE O/P EST MOD 30 MIN: CPT | Mod: PBBFAC,PO | Performed by: FAMILY MEDICINE

## 2019-01-21 NOTE — PROGRESS NOTES
Office Visit    Patient Name: Toby Green    : 1930  MRN: [id number]    Subjective:  Toby is a 88 y.o. male who presents today for:    Follow-up (3 month f/u)    Toby Green is an 89 yo patient of mine last seen for annual wellness exam 3/23/2018 and here for 3 month follow up of office visit 10/22/2018 where chonic conditions including: hypertension, hyperlipidemia, GERD with history of GI bleed and some resulting iron deficiency anemia along with history of B12 deficiency associated with anemia, and history of excessive alcohol consumption were addressed along with drawing up to date labs on 10/22/2018 overall unremarkable including vitamin-D, TSH, CBC, liver kidney function, electrolytes, B12.  He was started on Aricept 5 mg daily due to concern for Alzheimer's dementia given worsening short-term memory, poor judgment, and some irritability/acting out episodes.  He was started on Remeron 15 mg nightly to help with agitation and sleep.    Family reports that he has been resistant to starting new medications.  He is actually not started the Aricept or Remeron until couple of days ago.  No side effects have been appreciated, but it is too early to tell if they are helping.     Physically he in family reports that he is feeling well.  He has no complaints of pain and  his mobility is good.    Past Medical History  Past Medical History:   Diagnosis Date    Benign essential hypertension     Borderline glaucoma, open angle with borderline findings 10/19/2012    Constipation 2014    Erectile dysfunction     GERD (gastroesophageal reflux disease)     History of GI bleed 2016    Hyperlipidemia     Iron deficiency anemia 2016    Nuclear sclerosis 10/19/2012       Past Surgical History  Past Surgical History:   Procedure Laterality Date    CATARACT EXTRACTION W/  INTRAOCULAR LENS IMPLANT  12    OD w/     COLONOSCOPY N/A 10/31/2017    Performed by Oswaldo GOMES  MD [facility] at [facility]    COLONOSCOPY N/A 9/29/2014    Performed by David Brito MD at [facility] ENDO (4TH FLR)    CRYOSURGERY-EYE Right 12/11/2012    Performed by Felicia Tavarez MD at [facility] OR 1ST FLR    egd  N/A 6/4/2014    Performed by Hiwot Loyd MD at [facility] ENDO    ESOPHAGOGASTRODUODENOSCOPY (EGD) N/A 10/30/2017    Performed by Aaron Yañez MD at [facility] ENDO    ESOPHAGOGASTRODUODENOSCOPY (EGD) N/A 12/13/2016    Performed by Bala Robles MD at [facility] END[facility]    INSERTION, IOL PROSTHESIS Right 12/11/2012    Performed by Felicia [facility] at [facility] OR 1ST FLR    INSERTION, IOL PROSTHESIS Right 12/5/2012    Performed by Itzel Monson MD at [facility] OR 1ST FLR    LASER ENDOSCOPIC Right 12/11/2012    Performed by [facility]icia [facility] at [facility] OR 1ST FLR    lump in back removed  2009    lump removal      head    PARS PLANA VITRECTOMY  12/11/12    OD w/ dr. tavarez    PHACOEMULSIFICATION, CATARACT Right 12/5/2012    Performed by Itzel Monson MD at [facility] OR 1ST FLR    REMOVAL, FOREIGN BODY, EYE Right 12/11/2012    Performed by [facility] at [facility] OR 1ST FLR    REPAIR, RETINAL DETACHMENT, WITH VITRECTOMY Right 3/26/2013    Performed by Felicia [facility] at [facility] OR [facility]    REPAIR, RETINAL DETACHMENT, WITH VITRECTOMY Right 12/11/2012    Performed by Felicia [facility]arez [facility] at [facility] OR 1ST FLR    RETINAL DETACHMENT SURGERY  3/26/13    Right eye    TONSILLECTOMY         Family History  Family History   Problem Relation Age of Onset    Cancer Mother     Diabetes Mother     Hypertension Father     Stroke Father     Cancer Sister     Cancer Brother     Cataracts Paternal Grandmother     Glaucoma Paternal Grandmother     Hypertension Paternal Grandmother     Amblyopia Neg Hx     Blindness Neg Hx     Macular degeneration Neg Hx     Retinal detachment Neg Hx     Strabismus Neg Hx     Thyroid disease Neg Hx        Social History  Social History  "    Socioeconomic History    Marital status:      Spouse name: Not on file    Number of children: Not on file    Years of education: Not on file    Highest education level: Not on file   Social Needs    Financial resource strain: Not on file    Food insecurity - worry: Not on file    Food insecurity - inability: Not on file    Transportation needs - medical: Not on file    Transportation needs - non-medical: Not on file   Occupational History    Not on file   Tobacco Use    Smoking status: Former Smoker    Smokeless tobacco: Never Used   Substance and Sexual Activity    Alcohol use: Yes     Alcohol/week: 0.5 oz     Types: 1 Standard drinks or equivalent per week     Comment: soically    Drug use: No    Sexual activity: Not on file   Other Topics Concern    Not on file   Social History Narrative    Wife -Ada       Current Medications  Medications reviewed and updated.     Allergies   Review of patient's allergies indicates:   Allergen Reactions    Aleve  [naproxen sodium]      Other reaction(s): stomch bleeding    Naproxen      Other reaction(s): bleeding    Ticlid  [ticlopidine]      Other reaction(s): extreme bleeding       Review of Systems (Pertinent positives)  Review of Systems   Constitutional: Negative for unexpected weight change.   Respiratory: Negative for shortness of breath.    Cardiovascular: Negative for chest pain.   Gastrointestinal: Negative for abdominal pain, blood in stool, constipation and diarrhea.   Genitourinary: Positive for frequency.   Musculoskeletal: Negative for arthralgias.   Psychiatric/Behavioral: Positive for agitation, behavioral problems, confusion and sleep disturbance.       /79   Pulse 85   Ht 5' 9.5" (1.765 m)   Wt 73 kg (160 lb 15 oz)   SpO2 96%   BMI 23.43 kg/m²     Physical Exam   Constitutional: He appears well-developed and well-nourished. No distress.   Cardiovascular: Normal rate and regular rhythm.   Murmur heard.   Systolic murmur " is present with a grade of 3/6.  Pulmonary/Chest: Effort normal and breath sounds normal.   Musculoskeletal: He exhibits no edema.   Neurological: He is alert.   Patient is oriented to person and place-- less to time than previous (knew day of the week but initially did not know the month).  He is unable to count backwards from 100 by serial sevens.  2 of 3 object recall at 5 min.  Significant confusion regarding his health history and current medication regimen.  He is severely hard of hearing and it is unclear how much this is contributing to his perceived confusion. He declines to wear his hearing aid.   Psychiatric: He is agitated. Cognition and memory are impaired. He expresses inappropriate judgment (poor insight into driving ability and ability to function independently ). He exhibits abnormal recent memory and abnormal remote memory.   Vitals reviewed.        Assessment/Plan:  Toby Green is a 88 y.o. male who presents today for :    Toby was seen today for follow-up.    Diagnoses and all orders for this visit:    Dementia with behavioral disturbance, unspecified dementia type  Comments:  Needs to start Aricept 5 mg daily, advised that we can increase this if tolerating well.  Case management referral placed.  Remeron for help w/ agitation/sleep  Orders:  -     Cancel: Comprehensive metabolic panel; Future  -     Cancel: CBC auto differential; Future  -     Cancel: TSH; Future  -     Vitamin B12; Future  -     Comprehensive metabolic panel; Future  -     TSH; Future  -     CBC auto differential; Future  -     Ambulatory referral to Outpatient Case Management    Mixed hyperlipidemia  -     Cancel: Comprehensive metabolic panel; Future  -     Comprehensive metabolic panel; Future    Benign essential hypertension  Comments:  Heart rate and blood pressure well controlled on Cardizem 240 mg daily  Orders:  -     Cancel: Comprehensive metabolic panel; Future  -     TSH; Future  -     Ambulatory referral to  Outpatient Case Management    Gastroesophageal reflux disease with esophagitis  -     Ambulatory referral to Outpatient Case Management    Benign prostatic hyperplasia without lower urinary tract symptoms  -     Ambulatory referral to Outpatient Case Management    History of GI bleed  Comments:  No longer abusing alcohol though it sounds like he does still drink 1 beer on most days.  On omeprazole for stomach protection.  Not taking NSAIDs  Orders:  -     Cancel: CBC auto differential; Future  -     Ambulatory referral to Outpatient Case Management    History of alcohol abuse  Comments:  reports 1 beer at most daily for current alcohol consumption  Orders:  -     Ambulatory referral to Outpatient Case Management    Constipation, unspecified constipation type    Vitamin D deficiency    Anemia due to vitamin B12 deficiency, unspecified B12 deficiency type  Comments:  Monitoring levels periodically, they were normal as of last blood draw  Orders:  -     Cancel: CBC auto differential; Future  -     Vitamin B12; Future  -     CBC auto differential; Future    SVT (supraventricular tachycardia)  -     Cancel: Comprehensive metabolic panel; Future  -     Cancel: CBC auto differential; Future  -     Cancel: TSH; Future  -     Ambulatory referral to Outpatient Case Management    Medication management  -     Cancel: Comprehensive metabolic panel; Future  -     Cancel: CBC auto differential; Future  -     Cancel: TSH; Future  -     Vitamin B12; Future  -     Comprehensive metabolic panel; Future  -     TSH; Future  -     CBC auto differential; Future  -     Ambulatory referral to Outpatient Case Management    Driving safety issue  Comments:  Will fill out form for DMV expressing concerns regarding his ability to drive safely.  Case management referral for transportation support  Orders:  -     Ambulatory referral to Outpatient Case Management            ICD-10-CM ICD-9-CM    1. Dementia with behavioral disturbance, unspecified  dementia type F03.91 294.21 Vitamin B12      Comprehensive metabolic panel      TSH      CBC auto differential      Ambulatory referral to Outpatient Case Management      CANCELED: Comprehensive metabolic panel      CANCELED: CBC auto differential      CANCELED: TSH    Needs to start Aricept 5 mg daily, advised that we can increase this if tolerating well.  Case management referral placed.  Remeron for help w/ agitation/sleep   2. Mixed hyperlipidemia E78.2 272.2 Comprehensive metabolic panel      CANCELED: Comprehensive metabolic panel   3. Benign essential hypertension I10 401.1 TSH      Ambulatory referral to Outpatient Case Management      CANCELED: Comprehensive metabolic panel    Heart rate and blood pressure well controlled on Cardizem 240 mg daily   4. Gastroesophageal reflux disease with esophagitis K21.0 530.11 Ambulatory referral to Outpatient Case Management   5. Benign prostatic hyperplasia without lower urinary tract symptoms N40.0 600.00 Ambulatory referral to Outpatient Case Management   6. History of GI bleed Z87.19 V12.79 Ambulatory referral to Outpatient Case Management      CANCELED: CBC auto differential    No longer abusing alcohol though it sounds like he does still drink 1 beer on most days.  On omeprazole for stomach protection.  Not taking NSAIDs   7. History of alcohol abuse Z87.898 305.03 Ambulatory referral to Outpatient Case Management    reports 1 beer at most daily for current alcohol consumption   8. Constipation, unspecified constipation type K59.00 564.00    9. Vitamin D deficiency E55.9 268.9    10. Anemia due to vitamin B12 deficiency, unspecified B12 deficiency type D51.9 281.1 Vitamin B12      CBC auto differential      CANCELED: CBC auto differential    Monitoring levels periodically, they were normal as of last blood draw   11. SVT (supraventricular tachycardia) I47.1 427.89 Ambulatory referral to Outpatient Case Management      CANCELED: Comprehensive metabolic panel       CANCELED: CBC auto differential      CANCELED: TSH   12. Medication management Z79.899 V58.69 Vitamin B12      Comprehensive metabolic panel      TSH      CBC auto differential      Ambulatory referral to Outpatient Case Management      CANCELED: Comprehensive metabolic panel      CANCELED: CBC auto differential      CANCELED: TSH   13. Driving safety issue Z91.89 V15.89 Ambulatory referral to Outpatient Case Management    Will fill out form for DMV expressing concerns regarding his ability to drive safely.  Case management referral for transportation support       Patient Instructions   Continue current medications as prescribed.    Added Aricept 5 mg daily at any time of the day to hopefully help maintain memory.    Add Remeron 1/2 to 1 15 mg tablet at night to help with agitation and sleep.  Can ultimately add additional medications for help during the day depending on how he does with improved sleep on the Remeron.    Advised to avoid alcohol completely, especially late at night is this is interfering with sleep and leading to nocturnal urination.    Have placed a referral to case management for access to transportation services and support services for which he may qualify.    Will complete a form to fax to the DMV regarding concern for unsafe driving.        Follow-up in about 4 months (around 5/21/2019) for Annual physical with labs prior and about 4 months, follow up sooner if concerns.

## 2019-01-21 NOTE — PATIENT INSTRUCTIONS
Continue current medications as prescribed.    Added Aricept 5 mg daily at any time of the day to hopefully help maintain memory.    Add Remeron 1/2 to 1 15 mg tablet at night to help with agitation and sleep.  Can ultimately add additional medications for help during the day depending on how he does with improved sleep on the Remeron.    Advised to avoid alcohol completely, especially late at night is this is interfering with sleep and leading to nocturnal urination.    Have placed a referral to case management for access to transportation services and support services for which he may qualify.    Will complete a form to fax to the DMV regarding concern for unsafe driving.

## 2019-01-22 ENCOUNTER — OUTPATIENT CASE MANAGEMENT (OUTPATIENT)
Dept: ADMINISTRATIVE | Facility: OTHER | Age: 84
End: 2019-01-22

## 2019-01-22 NOTE — PROGRESS NOTES
Thank you for the referral.  Patient has been assigned to Roseanna Richey LMSW for low risk screening for Outpatient Case Management.     Reason for referral: Dementia with behavioral disturbance, unspecified dementia type [F03.91] (Needs to start Aricept 5 mg daily, advised that we can increase this if tolerating well. Case management referral placed. Remeron for help w/ agitation/sleep)  Benign essential hypertension [I10] (Heart rate and blood pressure well controlled on Cardizem 240 mg daily)  Gastroesophageal reflux disease with esophagitis [K21.0]  Benign prostatic hyperplasia without lower urinary tract symptoms [N40.0]  SVT (supraventricular tachycardia) [I47.1]  Medication management [Z79.899]  Driving safety issue [Z91.89] (Will fill out form for DMV expressing concerns regarding his ability to drive safely. Case management referral for transportation support)  History of GI bleed [Z87.19] (No longer abusing alcohol though it sounds like he does still drink 1 beer on most days. On omeprazole for stomach protection. Not taking NSAIDs)  History of alcohol abuse [Z87.898] (reports 1 beer at most daily for current alcohol consumption)    Please contact [facility] at [address]. [phone] with any questions.    Thank you,    Denae Ramirez, [facility] [place] [facility]  [phone]

## 2019-01-23 ENCOUNTER — OUTPATIENT CASE MANAGEMENT (OUTPATIENT)
Dept: ADMINISTRATIVE | Facility: OTHER | Age: 84
End: 2019-01-23

## 2019-01-24 ENCOUNTER — OUTPATIENT CASE MANAGEMENT (OUTPATIENT)
Dept: ADMINISTRATIVE | Facility: OTHER | Age: 84
End: 2019-01-24

## 2019-01-24 NOTE — PROGRESS NOTES
2nd attempt     This LMSW attempted to reach patient/caregiver to provide resource and left msg requesting a return call.  Letter with contact information was sent via Patient Portal  to patient/caregiver.  Referral source notified.

## 2019-01-28 ENCOUNTER — OUTPATIENT CASE MANAGEMENT (OUTPATIENT)
Dept: ADMINISTRATIVE | Facility: OTHER | Age: 84
End: 2019-01-28

## 2019-01-28 NOTE — PROGRESS NOTES
This LMSW received a referral on the above patient.   Reason for referral:Dementia with behavioral disturbance, unspecified dementia type [F03.91] (Needs to start Aricept 5 mg daily, advised that we can increase this if tolerating well. Case management referral placed. Remeron for help w/ agitation/sleep)   Benign essential hypertension [I10] (Heart rate and blood pressure well controlled on Cardizem 240 mg daily)   Gastroesophageal reflux disease with esophagitis [K21.0]   Benign prostatic hyperplasia without lower urinary tract symptoms [N40.0]   SVT (supraventricular tachycardia) [I47.1]   Medication management [Z79.899]   Driving safety issue [Z91.89] (Will fill out form for DMV expressing concerns regarding his ability to drive safely. Case management referral for transportation support)   History of GI bleed [Z87.19] (No longer abusing alcohol though it sounds like he does still drink 1 beer on most days. On omeprazole for stomach protection. Not taking NSAIDs)   History of alcohol abuse [Z87.898] (reports 1 beer at most daily for current alcohol consumption)   Name of the community resource that was provided: Senior Resource Guide ( Adult Day Care , [facility]) VET Attend ,   Resource given to: Patient Caregiver ( Kerri) via [employer] Mail and Telephone     LMSW received a return call from patient caregiver. LMSW reopened case to complete SW assessment. This LMSW completed assessment with patient caregiver/ Kerri. Caregiver reports patient and spouse resides together. Caregiver reports her, and son checks on patient daily. Caregiver reports patient can complete ADL's without assistance. Caregiver denied patient needs assistance with food, shelter, medication or medical. Caregiver reports she provides transportation to and from appointments. Caregivers reports having MPOA. Caregiver open to receiving additional resources for daytime care. LMSW provided caregiver with a senior resource guide to assist with Adult  Day Care and Senior Center. Caregiver reports NH placement will not be an option. Caregiver would like to keep patient at home. LMSW provided caregiver with information to apply for Vet Attend. Caregiver denied needing any support group information. LMSW mailed All resources information to Kerri Peter address. [id number] [place] [address]. This LMSW also provided  her contact information for any additional needs.

## 2019-03-10 DIAGNOSIS — N40.0 BENIGN NON-NODULAR PROSTATIC HYPERPLASIA WITHOUT LOWER URINARY TRACT SYMPTOMS: ICD-10-CM

## 2019-03-10 RX ORDER — TAMSULOSIN HYDROCHLORIDE 0.4 MG/1
CAPSULE ORAL
Qty: 90 CAPSULE | Refills: 3 | Status: SHIPPED | OUTPATIENT
Start: 2019-03-10 | End: 2020-03-13 | Stop reason: SDUPTHER

## 2019-03-10 RX ORDER — FINASTERIDE 5 MG/1
TABLET, FILM COATED ORAL
Qty: 90 TABLET | Refills: 3 | Status: SHIPPED | OUTPATIENT
Start: 2019-03-10 | End: 2020-03-13 | Stop reason: SDUPTHER

## 2019-03-31 ENCOUNTER — HOSPITAL ENCOUNTER (EMERGENCY)
Facility: HOSPITAL | Age: 84
Discharge: HOME | End: 2019-03-31
Attending: SURGERY
Payer: MEDICARE

## 2019-03-31 VITALS
HEIGHT: 69 IN | BODY MASS INDEX: 25.18 KG/M2 | OXYGEN SATURATION: 98 % | TEMPERATURE: 98 F | SYSTOLIC BLOOD PRESSURE: 154 MMHG | HEART RATE: 78 BPM | WEIGHT: 170 LBS | DIASTOLIC BLOOD PRESSURE: 72 MMHG | RESPIRATION RATE: 18 BRPM

## 2019-03-31 DIAGNOSIS — K57.33 DIVERTICULITIS OF LARGE INTESTINE WITHOUT PERFORATION OR ABSCESS WITH BLEEDING: Primary | ICD-10-CM

## 2019-03-31 DIAGNOSIS — K59.00 CONSTIPATION, UNSPECIFIED CONSTIPATION TYPE: ICD-10-CM

## 2019-03-31 LAB
ALBUMIN SERPL BCP-MCNC: 3.8 G/DL (ref 3.5–5.2)
ALP SERPL-CCNC: 78 U/L (ref 38–126)
ALT SERPL W/O P-5'-P-CCNC: 15 U/L (ref 10–44)
ANION GAP SERPL CALC-SCNC: 4 MMOL/L (ref 8–16)
AST SERPL-CCNC: 24 U/L (ref 15–46)
BASOPHILS # BLD AUTO: 0.05 K/UL (ref 0–0.2)
BASOPHILS NFR BLD: 1 % (ref 0–1.9)
BILIRUB SERPL-MCNC: 0.5 MG/DL (ref 0.1–1)
BILIRUB UR QL STRIP: NEGATIVE
BUN SERPL-MCNC: 15 MG/DL (ref 2–20)
CALCIUM SERPL-MCNC: 8.7 MG/DL (ref 8.7–10.5)
CHLORIDE SERPL-SCNC: 107 MMOL/L (ref 95–110)
CLARITY UR REFRACT.AUTO: CLEAR
CO2 SERPL-SCNC: 29 MMOL/L (ref 23–29)
COLOR UR AUTO: YELLOW
CREAT SERPL-MCNC: 1.45 MG/DL (ref 0.5–1.4)
DIFFERENTIAL METHOD: ABNORMAL
EOSINOPHIL # BLD AUTO: 0.2 K/UL (ref 0–0.5)
EOSINOPHIL NFR BLD: 4.7 % (ref 0–8)
ERYTHROCYTE [DISTWIDTH] IN BLOOD BY AUTOMATED COUNT: 14.1 % (ref 11.5–14.5)
EST. GFR  (AFRICAN AMERICAN): 49 ML/MIN/1.73 M^2
EST. GFR  (NON AFRICAN AMERICAN): 42.4 ML/MIN/1.73 M^2
GLUCOSE SERPL-MCNC: 101 MG/DL (ref 70–110)
GLUCOSE UR QL STRIP: NEGATIVE
HCT VFR BLD AUTO: 37.1 % (ref 40–54)
HGB BLD-MCNC: 11.7 G/DL (ref 14–18)
HGB UR QL STRIP: NEGATIVE
INR PPP: 1.1 (ref 0.8–1.2)
KETONES UR QL STRIP: NEGATIVE
LEUKOCYTE ESTERASE UR QL STRIP: NEGATIVE
LYMPHOCYTES # BLD AUTO: 1.2 K/UL (ref 1–4.8)
LYMPHOCYTES NFR BLD: 25.5 % (ref 18–48)
MCH RBC QN AUTO: 28.3 PG (ref 27–31)
MCHC RBC AUTO-ENTMCNC: 31.5 G/DL (ref 32–36)
MCV RBC AUTO: 90 FL (ref 82–98)
MONOCYTES # BLD AUTO: 0.5 K/UL (ref 0.3–1)
MONOCYTES NFR BLD: 9.7 % (ref 4–15)
NEUTROPHILS # BLD AUTO: 2.9 K/UL (ref 1.8–7.7)
NEUTROPHILS NFR BLD: 58.9 % (ref 38–73)
NITRITE UR QL STRIP: NEGATIVE
OB PNL STL: POSITIVE
PH UR STRIP: 6 [PH] (ref 5–8)
PLATELET # BLD AUTO: 135 K/UL (ref 150–350)
PMV BLD AUTO: 11 FL (ref 9.2–12.9)
POTASSIUM SERPL-SCNC: 3.7 MMOL/L (ref 3.5–5.1)
PROT SERPL-MCNC: 7.2 G/DL (ref 6–8.4)
PROT UR QL STRIP: NEGATIVE
PROTHROMBIN TIME: 11.5 SEC (ref 9–12.5)
RBC # BLD AUTO: 4.13 M/UL (ref 4.6–6.2)
SODIUM SERPL-SCNC: 140 MMOL/L (ref 136–145)
SP GR UR STRIP: 1.01 (ref 1–1.03)
URN SPEC COLLECT METH UR: NORMAL
UROBILINOGEN UR STRIP-ACNC: NEGATIVE EU/DL
WBC # BLD AUTO: 4.87 K/UL (ref 3.9–12.7)

## 2019-03-31 PROCEDURE — 81003 URINALYSIS AUTO W/O SCOPE: CPT | Mod: ER

## 2019-03-31 PROCEDURE — 25000003 PHARM REV CODE 250: Mod: ER | Performed by: SURGERY

## 2019-03-31 PROCEDURE — 96361 HYDRATE IV INFUSION ADD-ON: CPT | Mod: ER

## 2019-03-31 PROCEDURE — 82272 OCCULT BLD FECES 1-3 TESTS: CPT | Mod: ER

## 2019-03-31 PROCEDURE — 99284 EMERGENCY DEPT VISIT MOD MDM: CPT | Mod: 25,ER

## 2019-03-31 PROCEDURE — 85610 PROTHROMBIN TIME: CPT | Mod: ER

## 2019-03-31 PROCEDURE — 63600175 PHARM REV CODE 636 W HCPCS: Mod: ER | Performed by: SURGERY

## 2019-03-31 PROCEDURE — 80053 COMPREHEN METABOLIC PANEL: CPT | Mod: ER

## 2019-03-31 PROCEDURE — 96365 THER/PROPH/DIAG IV INF INIT: CPT | Mod: ER

## 2019-03-31 PROCEDURE — 85025 COMPLETE CBC W/AUTO DIFF WBC: CPT | Mod: ER

## 2019-03-31 RX ORDER — BISACODYL 10 MG
SUPPOSITORY, RECTAL RECTAL
Status: DISCONTINUED
Start: 2019-03-31 | End: 2019-03-31 | Stop reason: HOSPADM

## 2019-03-31 RX ORDER — METRONIDAZOLE 500 MG/1
500 TABLET ORAL 3 TIMES DAILY
Qty: 21 TABLET | Refills: 0 | Status: SHIPPED | OUTPATIENT
Start: 2019-03-31 | End: 2019-04-07

## 2019-03-31 RX ORDER — CIPROFLOXACIN 750 MG/1
750 TABLET, FILM COATED ORAL EVERY 12 HOURS
Qty: 14 TABLET | Refills: 0 | Status: SHIPPED | OUTPATIENT
Start: 2019-03-31 | End: 2020-05-15 | Stop reason: ALTCHOICE

## 2019-03-31 RX ORDER — BISACODYL 10 MG
10 SUPPOSITORY, RECTAL RECTAL DAILY PRN
Status: DISCONTINUED | OUTPATIENT
Start: 2019-03-31 | End: 2019-03-31 | Stop reason: HOSPADM

## 2019-03-31 RX ADMIN — SODIUM CHLORIDE 1000 ML: 0.9 INJECTION, SOLUTION INTRAVENOUS at 08:03

## 2019-03-31 RX ADMIN — PIPERACILLIN AND TAZOBACTAM 4.5 G: 4; .5 INJECTION, POWDER, LYOPHILIZED, FOR SOLUTION INTRAVENOUS; PARENTERAL at 09:03

## 2019-03-31 RX ADMIN — BISACODYL 10 MG: 10 SUPPOSITORY RECTAL at 09:03

## 2019-03-31 NOTE — ED NOTES
Ambulatory to exam room. C/o blood in stool last week and constipation today. Hx of rectal bleeding and alcoholism. Denies any abdominal pain. VSS.

## 2019-03-31 NOTE — ED PROVIDER NOTES
"Encounter Date: 3/31/2019       History     Chief Complaint   Patient presents with    Rectal Bleeding     "I had baseball sized blood logs coming out my rectum last Wednesday and now I can't have a bowel movement at all" pt AAOx3, daughter states her father is "a drinker and he sneaks it"     Patient was brought in by his daughter for evaluation.  Patient states on Wednesday he had large melanotic stools and since that time he has been constipated.  He has tried a laxative but thinks he is  impacted.  He does not have any signs of obstruction like vomiting. He had a similar episode 2017 where he was worked up in the hospital with a gastroscopy and colonoscopy did which did not show an acute bleeding source.  He has a history of alcohol intake    The history is provided by the patient and a relative.   Abdominal Cramping   The primary symptoms of the illness include abdominal pain and hematochezia. The current episode started several days ago. The onset of the illness was gradual.   The abdominal pain began more than 2 days ago. The pain came on gradually. The abdominal pain is located in the LLQ. The abdominal pain does not radiate. The severity of the abdominal pain is 3/10. The abdominal pain is relieved by bowel movement. The abdominal pain is exacerbated by bowel movements.   The hematochezia began 3 to 5 days ago.     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    Constipation 5/23/2014    Erectile dysfunction     GERD (gastroesophageal reflux disease)     History of GI bleed 12/12/2016    Hyperlipidemia     Iron deficiency anemia 12/13/2016    Nuclear " sclerosis 10/19/2012     Past Surgical History:   Procedure Laterality Date    CATARACT EXTRACTION W/  INTRAOCULAR LENS IMPLANT  12/5/12    OD w/     COLONOSCOPY N/A 10/31/2017    [facility] at [facility]    COLONOSCOPY N/A 9/29/2014    Performed by [facility] at [facility] ENDO (4TH FLR)    CRYOSURGERY-EYE Right 12/11/2012    [facility]formed by [facility] at [facility] 1ST FLR    egd  N/A 6/4/2014    [facility] by [facility]wot [facility]oyd [facility] at [facility] ENDO    ESOPHAGOGASTRODUODENOSCOPY (EGD) N/A 10/30/2017    [facility]formed by Aaron [facility] at [facility] END[facility]    ESOPHAGOGASTRODUODENOSCOPY (EGD) N/A 12/13/2016    Performed by Bala Robles [facility] at [facility] ENDO    INSERTION, IOL PROSTHESIS Right 12/11/2012    [facility] by [facility] at [facility] 1ST FLR    INSERTION, IOL PROSTHESIS Right 12/5/2012    [facility]formed by [facility]zel [facility]on [facility] at [facility] 1ST FLR    LASER ENDOSCOPIC Right 12/11/2012    Performed by [facility] at [facility] 1ST FLR    lump in back removed  2009    lump removal      head    PARS PLANA VITRECTOMY  12/11/12    OD w/ dr. tavarez    PHACOEMULSIFICATION, CATARACT Right 12/5/2012    Performed by [facility] at [facility] 1ST FLR    REMOVAL, FOREIGN BODY, EYE Right 12/11/2012    [facility]formed by [facility] at [facility] 1ST FLR    REPAIR, RETINAL DETACHMENT, WITH VITRECTOMY Right 3/26/2013    Performed by [facility] at [facility] 1ST FLR    REPAIR, RETINAL DETACHMENT, WITH VITRECTOMY Right 12/11/2012    Performed by [facility] at [facility]ST FLR    RETINAL DETACHMENT SURGERY  3/26/13    Right eye    TONSILLECTOMY       Family History   Problem Relation Age of Onset    Cancer Mother     Diabetes Mother     Hypertension Father     Stroke Father     Cancer Sister     Cancer Brother     Cataracts Paternal Grandmother     Glaucoma Paternal Grandmother     Hypertension Paternal Grandmother      Amblyopia Neg Hx     Blindness Neg Hx     Macular degeneration Neg Hx     Retinal detachment Neg Hx     Strabismus Neg Hx     Thyroid disease Neg Hx      Social History     Tobacco Use    Smoking status: Former Smoker    Smokeless tobacco: Never Used   Substance Use Topics    Alcohol use: Yes     Alcohol/week: 0.5 oz     Types: 1 Standard drinks or equivalent per week     Comment: soically    Drug use: No     Review of Systems   Constitutional: Negative.    HENT: Negative.    Eyes: Negative.    Respiratory: Negative.    Cardiovascular: Negative.    Gastrointestinal: Positive for abdominal pain and hematochezia.   Endocrine: Negative.    Genitourinary: Negative.    Musculoskeletal: Negative.    Skin: Negative.    Allergic/Immunologic: Negative.    Neurological: Negative.    Hematological: Negative.    Psychiatric/Behavioral: Negative.        Physical Exam     Initial Vitals [03/31/19 0809]   BP Pulse Resp Temp SpO2   (!) 160/76 74 17 98 °F (36.7 °C) 97 %      MAP       --         Physical Exam    Nursing note and vitals reviewed.  Constitutional: He appears well-developed and well-nourished.   HENT:   Head: Normocephalic.   Eyes: Conjunctivae are normal.   Neck: Normal range of motion. Neck supple.   Cardiovascular: Normal rate.   Pulmonary/Chest: Breath sounds normal.   Abdominal: Soft. He exhibits no distension. There is no tenderness. There is no rebound and no guarding.   Genitourinary:   Genitourinary Comments: Rectal exam done no impaction no bright red blood Hemoccult-positive   Musculoskeletal: Normal range of motion.   Neurological: He is alert and oriented to person, place, and time. GCS score is 15. GCS eye subscore is 4. GCS verbal subscore is 5. GCS motor subscore is 6.   Skin: Capillary refill takes less than 2 seconds.   Psychiatric: He has a normal mood and affect.         ED Course   Procedures  Labs Reviewed   COMPREHENSIVE METABOLIC PANEL - Abnormal; Notable for the following components:        Result Value    Creatinine 1.45 (*)     Anion Gap 4 (*)     eGFR if  49.0 (*)     eGFR if non  42.4 (*)     All other components within normal limits   CBC W/ AUTO DIFFERENTIAL - Abnormal; Notable for the following components:    RBC 4.13 (*)     Hemoglobin 11.7 (*)     Hematocrit 37.1 (*)     MCHC 31.5 (*)     Platelets 135 (*)     All other components within normal limits   OCCULT BLOOD X 1, STOOL - Abnormal; Notable for the following components:    Occult Blood Positive (*)     All other components within normal limits   URINALYSIS, REFLEX TO URINE CULTURE    Narrative:     Preferred Collection Type->Urine, Clean Catch   PROTIME-INR          Imaging Results          CT Renal Stone Study ABD Pelvis WO (Final result)  Result time 03/31/19 08:39:45    Final result by Petros Machado MD (03/31/19 08:39:45)                 Impression:      Diverticulosis seen throughout the descending and sigmoid colon with area of inflammation and mild mucosal thickening within the junction of the descending and sigmoid colon consistent with acute diverticulitis.  No evidence of perforation or abscess formation.    Moderate constipation.    See above for additional findings.    All CT scans at this facility use dose modulation, iterative reconstruction and/or weight based dosing when appropriate to reduce radiation dose to as low as reasonably achievable.      Electronically signed by: Petros Machado MD  Date:    03/31/2019  Time:    08:39             Narrative:    EXAMINATION:  CT RENAL STONE STUDY ABD PELVIS WO    CLINICAL HISTORY:  Abdominal pain, unspecified;constipation;    TECHNIQUE:  Routine 5 mm non-contrast images of the abdomen and pelvis were done.  Sagittal and coronal reformats were also submitted for interpretation.    COMPARISON:  10/30/2017    FINDINGS:  The lung bases are unremarkable.  Subpleural emphysematous changes are seen at the right lung base.  There is no pleural fluid  present.  The visualized portions of the heart appear normal.  Mild coronary artery disease.    The liver is normal in size and attenuation.  Multiple liver cysts are noted which were better characterized on recent ultrasound exam..  The gallbladder shows no evidence of stones or pericholecystic fluid.  There is no intra-or extrahepatic biliary ductal dilatation.    The spleen, pancreas, and adrenal glands are unremarkable.    Kidneys are small in size with right kidney slightly smaller than the left.  Hypodensity within the upper pole of the left kidney measuring 1.3 cm most consistent with a small cyst.  There is no evidence of hydronephrosis. Bladder is grossly unremarkable.  3.5 cm bladder diverticulum is seen within the left hemipelvis.  Prostate measures 5.2 cm.    Stomach is collapsed which limits evaluation.  Mild mucosal thickening could reflect mild gastritis.  The visualized loops of small bowel show no evidence of obstruction or inflammation. Large bowel demonstrates moderate constipation.  Diverticulosis seen throughout the descending and sigmoid colon with area of inflammation and mild mucosal thickening within the junction of the descending and sigmoid colon consistent with acute diverticulitis.  No evidence of appendicitis.  There is no ascites, free fluid, or intraperitoneal free air.    There is no evidence of lymph node enlargement in the abdomen or pelvis.    The abdominal aorta is normal in course and caliber with moderate atherosclerotic calcifications.    Bones demonstrate advanced degenerative changes.                                 Medical Decision Making:   Initial Assessment:   Stable GI bleed with evidence of acute diverticulitis  ED Management:  Patient has a history of GI bleed with no specific bleeding point identified in 2017.  His chief complaint today was constipation for 5 but he has clinical an x-ray evidence of a mild acute sigmoid diverticulitis without fever or white count or  toxicity.  His blood count is stable and his vital signs are stable and he shows no signs of active bleeding.  The bleeding complaint has been going on for 5 days I gave the patient and the daughter the option to come in the hospital but they want to manage this as an outpatient.  He was given antibiotics to treat diverticulitis He is return for any more bleeding or any more abdominal pain he will follow up with in the office in a week with his primary doctor if his condition does not get worse                      Clinical Impression:       ICD-10-CM ICD-9-CM   1. Diverticulitis of large intestine without perforation or abscess with bleeding K57.33 562.13   2. Constipation, unspecified constipation type K59.00 564.00         Disposition:   Disposition: Discharged  Condition: Stable                        MARY Gallegos III, MD  03/31/19 1111

## 2019-04-15 ENCOUNTER — PES CALL (OUTPATIENT)
Dept: ADMINISTRATIVE | Facility: CLINIC | Age: 84
End: 2019-04-15

## 2019-05-20 ENCOUNTER — LAB VISIT (OUTPATIENT)
Dept: LAB | Facility: HOSPITAL | Age: 84
End: 2019-05-20
Attending: FAMILY MEDICINE
Payer: MEDICARE

## 2019-05-20 DIAGNOSIS — D51.9 ANEMIA DUE TO VITAMIN B12 DEFICIENCY, UNSPECIFIED B12 DEFICIENCY TYPE: ICD-10-CM

## 2019-05-20 DIAGNOSIS — Z79.899 MEDICATION MANAGEMENT: ICD-10-CM

## 2019-05-20 DIAGNOSIS — I10 BENIGN ESSENTIAL HYPERTENSION: ICD-10-CM

## 2019-05-20 DIAGNOSIS — E78.2 MIXED HYPERLIPIDEMIA: ICD-10-CM

## 2019-05-20 DIAGNOSIS — F03.91 DEMENTIA WITH BEHAVIORAL DISTURBANCE, UNSPECIFIED DEMENTIA TYPE: ICD-10-CM

## 2019-05-20 LAB
ALBUMIN SERPL BCP-MCNC: 4.1 G/DL (ref 3.5–5.2)
ALP SERPL-CCNC: 88 U/L (ref 38–126)
ALT SERPL W/O P-5'-P-CCNC: 14 U/L (ref 10–44)
ANION GAP SERPL CALC-SCNC: 7 MMOL/L (ref 8–16)
AST SERPL-CCNC: 23 U/L (ref 15–46)
BASOPHILS # BLD AUTO: 0.05 K/UL (ref 0–0.2)
BASOPHILS NFR BLD: 0.9 % (ref 0–1.9)
BILIRUB SERPL-MCNC: 0.5 MG/DL (ref 0.1–1)
BUN SERPL-MCNC: 12 MG/DL (ref 2–20)
CALCIUM SERPL-MCNC: 9.5 MG/DL (ref 8.7–10.5)
CHLORIDE SERPL-SCNC: 104 MMOL/L (ref 95–110)
CO2 SERPL-SCNC: 32 MMOL/L (ref 23–29)
CREAT SERPL-MCNC: 1.47 MG/DL (ref 0.5–1.4)
DIFFERENTIAL METHOD: ABNORMAL
EOSINOPHIL # BLD AUTO: 0.2 K/UL (ref 0–0.5)
EOSINOPHIL NFR BLD: 4.5 % (ref 0–8)
ERYTHROCYTE [DISTWIDTH] IN BLOOD BY AUTOMATED COUNT: 14.3 % (ref 11.5–14.5)
EST. GFR  (AFRICAN AMERICAN): 48.2 ML/MIN/1.73 M^2
EST. GFR  (NON AFRICAN AMERICAN): 41.7 ML/MIN/1.73 M^2
GLUCOSE SERPL-MCNC: 108 MG/DL (ref 70–110)
HCT VFR BLD AUTO: 41.1 % (ref 40–54)
HGB BLD-MCNC: 12.5 G/DL (ref 14–18)
LYMPHOCYTES # BLD AUTO: 1.4 K/UL (ref 1–4.8)
LYMPHOCYTES NFR BLD: 26.8 % (ref 18–48)
MCH RBC QN AUTO: 26.1 PG (ref 27–31)
MCHC RBC AUTO-ENTMCNC: 30.4 G/DL (ref 32–36)
MCV RBC AUTO: 86 FL (ref 82–98)
MONOCYTES # BLD AUTO: 0.5 K/UL (ref 0.3–1)
MONOCYTES NFR BLD: 10 % (ref 4–15)
NEUTROPHILS # BLD AUTO: 3.1 K/UL (ref 1.8–7.7)
NEUTROPHILS NFR BLD: 57.8 % (ref 38–73)
PLATELET # BLD AUTO: 148 K/UL (ref 150–350)
PMV BLD AUTO: 11.9 FL (ref 9.2–12.9)
POTASSIUM SERPL-SCNC: 4.6 MMOL/L (ref 3.5–5.1)
PROT SERPL-MCNC: 8 G/DL (ref 6–8.4)
RBC # BLD AUTO: 4.79 M/UL (ref 4.6–6.2)
SODIUM SERPL-SCNC: 143 MMOL/L (ref 136–145)
TSH SERPL DL<=0.005 MIU/L-ACNC: 0.71 UIU/ML (ref 0.4–4)
VIT B12 SERPL-MCNC: 241 PG/ML (ref 210–950)
WBC # BLD AUTO: 5.29 K/UL (ref 3.9–12.7)

## 2019-05-20 PROCEDURE — 82607 VITAMIN B-12: CPT | Mod: PO

## 2019-05-20 PROCEDURE — 84443 ASSAY THYROID STIM HORMONE: CPT | Mod: PO

## 2019-05-20 PROCEDURE — 80053 COMPREHEN METABOLIC PANEL: CPT | Mod: PO

## 2019-05-20 PROCEDURE — 36415 COLL VENOUS BLD VENIPUNCTURE: CPT | Mod: PO

## 2019-05-20 PROCEDURE — 85025 COMPLETE CBC W/AUTO DIFF WBC: CPT | Mod: PO

## 2019-05-27 ENCOUNTER — TELEPHONE (OUTPATIENT)
Dept: FAMILY MEDICINE | Facility: CLINIC | Age: 84
End: 2019-05-27

## 2019-05-27 DIAGNOSIS — K59.00 CONSTIPATION, UNSPECIFIED CONSTIPATION TYPE: ICD-10-CM

## 2019-05-27 DIAGNOSIS — D69.6 THROMBOCYTOPENIA: ICD-10-CM

## 2019-05-27 DIAGNOSIS — I47.10 SVT (SUPRAVENTRICULAR TACHYCARDIA): ICD-10-CM

## 2019-05-27 DIAGNOSIS — E78.2 MIXED HYPERLIPIDEMIA: ICD-10-CM

## 2019-05-27 DIAGNOSIS — D62 ACUTE BLOOD LOSS ANEMIA: Primary | ICD-10-CM

## 2019-05-27 DIAGNOSIS — Z51.81 ENCOUNTER FOR MONITORING LONG-TERM PROTON PUMP INHIBITOR THERAPY: ICD-10-CM

## 2019-05-27 DIAGNOSIS — I10 BENIGN ESSENTIAL HYPERTENSION: ICD-10-CM

## 2019-05-27 DIAGNOSIS — D51.9 ANEMIA DUE TO VITAMIN B12 DEFICIENCY, UNSPECIFIED B12 DEFICIENCY TYPE: ICD-10-CM

## 2019-05-27 DIAGNOSIS — Z79.899 ENCOUNTER FOR MONITORING LONG-TERM PROTON PUMP INHIBITOR THERAPY: ICD-10-CM

## 2019-05-27 DIAGNOSIS — E55.9 VITAMIN D DEFICIENCY: ICD-10-CM

## 2019-05-27 DIAGNOSIS — K21.00 GASTROESOPHAGEAL REFLUX DISEASE WITH ESOPHAGITIS: ICD-10-CM

## 2019-06-05 ENCOUNTER — OUTPATIENT CASE MANAGEMENT (OUTPATIENT)
Dept: ADMINISTRATIVE | Facility: OTHER | Age: 84
End: 2019-06-05
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
## 2019-06-10 DIAGNOSIS — E78.5 HYPERLIPIDEMIA, UNSPECIFIED HYPERLIPIDEMIA TYPE: ICD-10-CM

## 2019-06-10 RX ORDER — PRAVASTATIN SODIUM 80 MG/1
TABLET ORAL
Qty: 90 TABLET | Refills: 3 | Status: SHIPPED | OUTPATIENT
Start: 2019-06-10 | End: 2020-03-13 | Stop reason: SDUPTHER

## 2019-06-26 ENCOUNTER — HOSPITAL ENCOUNTER (EMERGENCY)
Facility: HOSPITAL | Age: 84
Discharge: HOME | End: 2019-06-26
Attending: SURGERY
Payer: MEDICARE

## 2019-06-26 VITALS
OXYGEN SATURATION: 97 % | HEART RATE: 62 BPM | TEMPERATURE: 98 F | WEIGHT: 160 LBS | BODY MASS INDEX: 23.63 KG/M2 | DIASTOLIC BLOOD PRESSURE: 61 MMHG | RESPIRATION RATE: 17 BRPM | SYSTOLIC BLOOD PRESSURE: 136 MMHG

## 2019-06-26 DIAGNOSIS — S20.212A CHEST WALL CONTUSION, LEFT, INITIAL ENCOUNTER: Primary | ICD-10-CM
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
## 2019-06-26 DIAGNOSIS — R07.9 CHEST PAIN: ICD-10-CM

## 2019-06-26 LAB
ALBUMIN SERPL BCP-MCNC: 3.9 G/DL (ref 3.5–5.2)
ALP SERPL-CCNC: 84 U/L (ref 38–126)
ALT SERPL W/O P-5'-P-CCNC: 15 U/L (ref 10–44)
ANION GAP SERPL CALC-SCNC: 9 MMOL/L (ref 8–16)
AST SERPL-CCNC: 26 U/L (ref 15–46)
BASOPHILS # BLD AUTO: 0.03 K/UL (ref 0–0.2)
BASOPHILS NFR BLD: 0.4 % (ref 0–1.9)
BILIRUB SERPL-MCNC: 0.4 MG/DL (ref 0.1–1)
BUN SERPL-MCNC: 15 MG/DL (ref 2–20)
CALCIUM SERPL-MCNC: 8.9 MG/DL (ref 8.7–10.5)
CHLORIDE SERPL-SCNC: 107 MMOL/L (ref 95–110)
CO2 SERPL-SCNC: 25 MMOL/L (ref 23–29)
CREAT SERPL-MCNC: 1.26 MG/DL (ref 0.5–1.4)
DIFFERENTIAL METHOD: ABNORMAL
EOSINOPHIL # BLD AUTO: 0.3 K/UL (ref 0–0.5)
EOSINOPHIL NFR BLD: 3.6 % (ref 0–8)
ERYTHROCYTE [DISTWIDTH] IN BLOOD BY AUTOMATED COUNT: 15.2 % (ref 11.5–14.5)
EST. GFR  (AFRICAN AMERICAN): 58.1 ML/MIN/1.73 M^2
EST. GFR  (NON AFRICAN AMERICAN): 50.2 ML/MIN/1.73 M^2
GLUCOSE SERPL-MCNC: 114 MG/DL (ref 70–110)
HCT VFR BLD AUTO: 36.9 % (ref 40–54)
HGB BLD-MCNC: 11.1 G/DL (ref 14–18)
LYMPHOCYTES # BLD AUTO: 1.4 K/UL (ref 1–4.8)
LYMPHOCYTES NFR BLD: 18.8 % (ref 18–48)
MCH RBC QN AUTO: 24.4 PG (ref 27–31)
MCHC RBC AUTO-ENTMCNC: 30.1 G/DL (ref 32–36)
MCV RBC AUTO: 81 FL (ref 82–98)
MONOCYTES # BLD AUTO: 0.9 K/UL (ref 0.3–1)
MONOCYTES NFR BLD: 11.6 % (ref 4–15)
NEUTROPHILS # BLD AUTO: 4.9 K/UL (ref 1.8–7.7)
NEUTROPHILS NFR BLD: 65.6 % (ref 38–73)
PLATELET # BLD AUTO: 128 K/UL (ref 150–350)
PMV BLD AUTO: 11.2 FL (ref 9.2–12.9)
POTASSIUM SERPL-SCNC: 4.1 MMOL/L (ref 3.5–5.1)
PROT SERPL-MCNC: 7.6 G/DL (ref 6–8.4)
RBC # BLD AUTO: 4.54 M/UL (ref 4.6–6.2)
SODIUM SERPL-SCNC: 141 MMOL/L (ref 136–145)
TROPONIN I SERPL DL<=0.01 NG/ML-MCNC: <0.012 NG/ML (ref 0.01–0.03)
WBC # BLD AUTO: 7.5 K/UL (ref 3.9–12.7)

## 2019-06-26 PROCEDURE — 85025 COMPLETE CBC W/AUTO DIFF WBC: CPT | Mod: ER

## 2019-06-26 PROCEDURE — 93010 ELECTROCARDIOGRAM REPORT: CPT | Mod: ,,, | Performed by: INTERNAL MEDICINE

## 2019-06-26 PROCEDURE — 99285 EMERGENCY DEPT VISIT HI MDM: CPT | Mod: ER

## 2019-06-26 PROCEDURE — 80053 COMPREHEN METABOLIC PANEL: CPT | Mod: ER

## 2019-06-26 PROCEDURE — 93005 ELECTROCARDIOGRAM TRACING: CPT | Mod: ER

## 2019-06-26 PROCEDURE — 25000003 PHARM REV CODE 250: Mod: ER | Performed by: SURGERY

## 2019-06-26 PROCEDURE — 84484 ASSAY OF TROPONIN QUANT: CPT | Mod: ER

## 2019-06-26 PROCEDURE — 93010 EKG 12-LEAD: ICD-10-PCS | Mod: ,,, | Performed by: INTERNAL MEDICINE

## 2019-06-26 RX ORDER — BUTALBITAL, ACETAMINOPHEN AND CAFFEINE 50; 325; 40 MG/1; MG/1; MG/1
1 TABLET ORAL EVERY 6 HOURS PRN
Qty: 12 TABLET | Refills: 0 | Status: SHIPPED | OUTPATIENT
Start: 2019-06-26 | End: 2019-07-26

## 2019-06-26 RX ORDER — HYDROCODONE BITARTRATE AND ACETAMINOPHEN 5; 325 MG/1; MG/1
1 TABLET ORAL
Status: COMPLETED | OUTPATIENT
Start: 2019-06-26 | End: 2019-06-26

## 2019-06-26 RX ADMIN — HYDROCODONE BITARTRATE AND ACETAMINOPHEN 1 TABLET: 5; 325 TABLET ORAL at 07:06

## 2019-06-27 NOTE — ED PROVIDER NOTES
"Encounter Date: 6/26/2019       History     Chief Complaint   Patient presents with    Chest Pain     pt reports "my heart is hurting. I fell yesterday." Pain reported to left chest wall    Fall     Patient complains of chest wall pain over his heart after he fell yesterday.  He lost his balance.  Denies any loss of consciousness or neck pain.  Does not complain shortness of breath    The history is provided by the patient.   Fall   The accident occurred yesterday. The fall occurred while walking. He fell from a height of 3 to 5 ft. He landed on a hard floor. The pain is present in the chest. The pain is at a severity of 4/10. He was ambulatory at the scene. There was no entrapment after the fall. There was no drug use involved in the accident. There was no alcohol use involved in the accident. Pertinent negatives include no neck pain, no back pain, no paresthesias and no loss of consciousness.     Review of patient's allergies indicates:   Allergen Reactions    Naproxen      Other reaction(s): bleeding    Naproxen sodium Other (See Comments)     Other reaction(s): stomch bleeding    Ticlopidine Other (See Comments)     Other reaction(s): extreme bleeding     Past Medical History:   Diagnosis Date    Benign essential hypertension     Borderline glaucoma, open angle with borderline findings 10/19/2012    Constipation 5/23/2014    Erectile dysfunction     GERD (gastroesophageal reflux disease)     History of GI bleed 12/12/2016    Hyperlipidemia     Iron deficiency anemia 12/13/2016    Nuclear sclerosis 10/19/2012     Past Surgical History:   Procedure Laterality Date    CATARACT EXTRACTION W/  INTRAOCULAR LENS IMPLANT  12/5/12    OD w/     COLONOSCOPY N/A 10/31/2017    [facility]    COLONOSCOPY N/A 9/29/2014    Performed by [facility] at [facility] ENDO (4TH FLR)    CRYOSURGERY-EYE Right 12/11/2012    Performed by Felicia Riddle MD at [facility] OR " 1ST FLR    egd  N/A 6/4/2014    [facility] ENDO    ESOPHAGOGASTRODUODENOSCOPY (EGD) N/A 10/30/2017    [facility]formed by Aaron [facility]ez [facility] at [facility] ENDO    ESOPHAGOGASTRODUODENOSCOPY (EGD) N/A 12/13/2016    [facility]formed by Bala Robles MD at [facility] ENDO    INSERTION, IOL PROSTHESIS Right 12/11/2012    [facility]formed by Fle[facility] at [facility] 1ST FLR    INSERTION, IOL PROSTHESIS Right 12/5/2012    Performed by Itzel Monson MD at [facility] OR 1ST FLR    LASER ENDOSCOPIC Right 12/11/2012    Performed by [facility] at [facility] 1ST FLR    lump in back removed  2009    lump removal      head    PARS PLANA VITRECTOMY  12/11/12    OD w/ dr. tavarez    PHACOEMULSIFICATION, CATARACT Right 12/5/2012    Performed by [facility] at [facility]    REMOVAL, FOREIGN BODY, EYE Right 12/11/2012    Performed by [facility] at [facility] 1ST FLR    REPAIR, RETINAL DETACHMENT, WITH VITRECTOMY Right 3/26/2013    Performed by Fel[facility] at [facility] FLR    REPAIR, RETINAL DETACHMENT, WITH VITRECTOMY Right 12/11/2012    Performed by [facility] at [facility] 1ST FLR    RETINAL DETACHMENT SURGERY  3/26/13    Right eye    TONSILLECTOMY       Family History   Problem Relation Age of Onset    Cancer Mother     Diabetes Mother     Hypertension Father     Stroke Father     Cancer Sister     Cancer Brother     Cataracts Paternal Grandmother     Glaucoma Paternal Grandmother     Hypertension Paternal Grandmother     Amblyopia Neg Hx     Blindness Neg Hx     Macular degeneration Neg Hx     Retinal detachment Neg Hx     Strabismus Neg Hx     Thyroid disease Neg Hx      Social History     Tobacco Use    Smoking status: Former Smoker    Smokeless tobacco: Never Used   Substance Use Topics    Alcohol use: Yes     Alcohol/week: 0.5 oz     Types: 1 Standard drinks or equivalent per week     Comment: soically    Drug use:  No     Review of Systems   Constitutional: Negative.    HENT: Negative.    Eyes: Negative.    Respiratory: Negative.    Cardiovascular: Negative.    Gastrointestinal: Negative.    Endocrine: Negative.    Genitourinary: Negative.    Musculoskeletal: Negative.  Negative for back pain and neck pain.   Skin: Negative.    Allergic/Immunologic: Negative.    Neurological: Negative.  Negative for loss of consciousness and paresthesias.   Hematological: Negative.    Psychiatric/Behavioral: Negative.        Physical Exam     Initial Vitals   BP Pulse Resp Temp SpO2   06/26/19 1848 06/26/19 1840 06/26/19 1840 06/26/19 1840 06/26/19 1840   134/74 65 20 98.4 °F (36.9 °C) 98 %      MAP       --                Physical Exam    Nursing note and vitals reviewed.  Constitutional: He appears well-developed and well-nourished.   Eyes: EOM are normal.   Neck: Neck supple.   Cardiovascular: Normal rate.   Pulmonary/Chest: He exhibits tenderness.   Abdominal: Soft.   Musculoskeletal: Normal range of motion.        Arms:  Neurological: He is alert and oriented to person, place, and time. He has normal strength. GCS score is 15. GCS eye subscore is 4. GCS verbal subscore is 5. GCS motor subscore is 6.   Skin: Skin is warm.   Psychiatric: He has a normal mood and affect.         ED Course   Procedures  Labs Reviewed   CBC W/ AUTO DIFFERENTIAL - Abnormal; Notable for the following components:       Result Value    RBC 4.54 (*)     Hemoglobin 11.1 (*)     Hematocrit 36.9 (*)     Mean Corpuscular Volume 81 (*)     Mean Corpuscular Hemoglobin 24.4 (*)     Mean Corpuscular Hemoglobin Conc 30.1 (*)     RDW 15.2 (*)     Platelets 128 (*)     All other components within normal limits   COMPREHENSIVE METABOLIC PANEL - Abnormal; Notable for the following components:    Glucose 114 (*)     eGFR if  58.1 (*)     eGFR if non  50.2 (*)     All other components within normal limits   TROPONIN I     EKG Readings:  (Independently Interpreted)   Rhythm: Normal Sinus Rhythm. Ectopy: No Ectopy. Conduction: Normal. ST Segments: Normal ST Segments. T Waves: Normal. Clinical Impression: Normal Sinus Rhythm       Imaging Results          CT Chest Without Contrast (In process)                  Medical Decision Making:   Initial Assessment:   Rib contusion with no evidence of fracture  ED Management:  Physical exam shows localized chest wall pain.  X-rays are negative. EKG is normal                      Clinical Impression:       ICD-10-CM ICD-9-CM   1. Chest wall contusion, left, initial encounter S20.212A 922.1   2. Chest pain R07.9 786.50   3. Fall, initial encounter W19.XXXA E888.9         Disposition:   Disposition: Discharged  Condition: Stable                        MARY Gallegos III, MD  06/26/19 2018

## 2019-07-30 ENCOUNTER — PES CALL (OUTPATIENT)
Dept: ADMINISTRATIVE | Facility: CLINIC | Age: 84
End: 2019-07-30

## 2019-08-08 DIAGNOSIS — R41.89 COGNITIVE IMPAIRMENT: ICD-10-CM

## 2019-08-08 RX ORDER — DONEPEZIL HYDROCHLORIDE 5 MG/1
TABLET, FILM COATED ORAL
Qty: 90 TABLET | Refills: 3 | Status: SHIPPED | OUTPATIENT
Start: 2019-08-08 | End: 2020-07-27 | Stop reason: SDUPTHER

## 2019-08-22 ENCOUNTER — PES CALL (OUTPATIENT)
Dept: ADMINISTRATIVE | Facility: CLINIC | Age: 84
End: 2019-08-22

## 2019-11-08 ENCOUNTER — TELEPHONE (OUTPATIENT)
Dept: FAMILY MEDICINE | Facility: CLINIC | Age: 84
End: 2019-11-08

## 2019-11-08 DIAGNOSIS — I10 BENIGN ESSENTIAL HYPERTENSION: ICD-10-CM

## 2019-11-08 DIAGNOSIS — G30.1 LATE ONSET ALZHEIMER'S DISEASE WITH BEHAVIORAL DISTURBANCE: ICD-10-CM

## 2019-11-08 DIAGNOSIS — D51.9 ANEMIA DUE TO VITAMIN B12 DEFICIENCY, UNSPECIFIED B12 DEFICIENCY TYPE: ICD-10-CM

## 2019-11-08 DIAGNOSIS — I47.10 SVT (SUPRAVENTRICULAR TACHYCARDIA): ICD-10-CM

## 2019-11-08 DIAGNOSIS — Z87.19 HISTORY OF GI BLEED: ICD-10-CM

## 2019-11-08 DIAGNOSIS — F02.818 LATE ONSET ALZHEIMER'S DISEASE WITH BEHAVIORAL DISTURBANCE: ICD-10-CM

## 2019-11-08 DIAGNOSIS — F10.10 ALCOHOL ABUSE: Primary | ICD-10-CM

## 2019-11-08 NOTE — TELEPHONE ENCOUNTER
----- Message from Yamel Guevara MA sent at 11/8/2019  3:01 PM CST -----  Contact: pt Daughter Kerri   Would case management referral be needed for this?  ----- Message -----  From: Luisa Kidd  Sent: 11/8/2019   1:35 PM CST  To: [facility] Staff    Type:  Needs Medical Advice    Who Called:  Pts Daughter Kerri  Symptoms (please be specific):  /   How long has patient had these symptoms:  Pharmacy name and phone #:    Would the patient rather a call back or a response via [facility]?  Best Call Back Number: [phone]  Additional Information: would like a call back trying to located  to put Pt in Nursing Home

## 2019-11-08 NOTE — TELEPHONE ENCOUNTER
I placed a case management referral to address this, and I specified in the referral that the consult is to discuss long-term care placement due to worsening dementia.

## 2019-11-12 DIAGNOSIS — G47.00 INSOMNIA, UNSPECIFIED TYPE: ICD-10-CM

## 2019-11-12 RX ORDER — MIRTAZAPINE 15 MG/1
TABLET, FILM COATED ORAL
Qty: 90 TABLET | Refills: 3 | Status: SHIPPED | OUTPATIENT
Start: 2019-11-12 | End: 2020-07-27

## 2019-11-14 ENCOUNTER — OUTPATIENT CASE MANAGEMENT (OUTPATIENT)
Dept: ADMINISTRATIVE | Facility: OTHER | Age: 84
End: 2019-11-14

## 2019-11-14 NOTE — PROGRESS NOTES
Attempt #:  1  This LMSW attempted to reach patient/caregiver to provide resource and left a message requesting a return call.      LMSW received a return call from patient daughter. Daughter reports Daughter reports she would like to move forward with NH Placement for patient, however patient is not an agreement with it. Daughter reports its becoming extremely difficult caring for patient. LMSW explain to daughter if patient is not willing to be placed, she will need to get through the interdiction process where the court system will declare an individual not competent to take care of themselves. Daughter reports that's what she thought but PCP has made the recommendations patient needs to be a memory care unit. LMSW explained to daughter she will discuss case with her team as well as PCP for further recommendations.       Plan: Discuss patient case with team members to determine other resources  Ask PCP for recommendations.

## 2019-11-15 ENCOUNTER — OUTPATIENT CASE MANAGEMENT (OUTPATIENT)
Dept: ADMINISTRATIVE | Facility: OTHER | Age: 84
End: 2019-11-15
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
## 2019-11-26 ENCOUNTER — OUTPATIENT CASE MANAGEMENT (OUTPATIENT)
Dept: ADMINISTRATIVE | Facility: OTHER | Age: 84
End: 2019-11-26
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
## 2019-12-03 ENCOUNTER — TELEPHONE (OUTPATIENT)
Dept: FAMILY MEDICINE | Facility: CLINIC | Age: 84
End: 2019-12-03
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
## 2019-12-09 DIAGNOSIS — I10 BENIGN ESSENTIAL HYPERTENSION: ICD-10-CM

## 2019-12-09 RX ORDER — DILTIAZEM HYDROCHLORIDE 240 MG/1
240 CAPSULE, COATED, EXTENDED RELEASE ORAL DAILY
Qty: 90 CAPSULE | Refills: 3 | Status: SHIPPED | OUTPATIENT
Start: 2019-12-09 | End: 2020-03-13 | Stop reason: SDUPTHER

## 2019-12-09 RX ORDER — OMEPRAZOLE 40 MG/1
40 CAPSULE, DELAYED RELEASE ORAL DAILY
Qty: 90 CAPSULE | Refills: 3 | Status: SHIPPED | OUTPATIENT
Start: 2019-12-09 | End: 2020-07-27 | Stop reason: SDUPTHER

## 2019-12-16 ENCOUNTER — OUTPATIENT CASE MANAGEMENT (OUTPATIENT)
Dept: ADMINISTRATIVE | Facility: OTHER | Age: 84
End: 2019-12-16

## 2019-12-16 NOTE — PROGRESS NOTES
Follow Up  Reason for referral: NH Placement/ Additional resources       LMSW unable to reach some one In [facility]  to determine if assistance is provided with Int. LMSW resent referral to Preethi DELGADILLO with the [facility] ., LMSW will close case at this time.

## 2019-12-18 ENCOUNTER — TELEPHONE (OUTPATIENT)
Dept: RESEARCH | Facility: HOSPITAL | Age: 84
End: 2019-12-18

## 2020-01-13 ENCOUNTER — LAB VISIT (OUTPATIENT)
Dept: LAB | Facility: HOSPITAL | Age: 85
End: 2020-01-13
Attending: FAMILY MEDICINE
Payer: MEDICARE

## 2020-01-13 DIAGNOSIS — E78.2 MIXED HYPERLIPIDEMIA: ICD-10-CM

## 2020-01-13 DIAGNOSIS — Z79.899 ENCOUNTER FOR MONITORING LONG-TERM PROTON PUMP INHIBITOR THERAPY: ICD-10-CM

## 2020-01-13 DIAGNOSIS — E55.9 VITAMIN D DEFICIENCY: ICD-10-CM

## 2020-01-13 DIAGNOSIS — I10 BENIGN ESSENTIAL HYPERTENSION: ICD-10-CM

## 2020-01-13 DIAGNOSIS — Z51.81 ENCOUNTER FOR MONITORING LONG-TERM PROTON PUMP INHIBITOR THERAPY: ICD-10-CM

## 2020-01-13 DIAGNOSIS — I47.10 SVT (SUPRAVENTRICULAR TACHYCARDIA): ICD-10-CM

## 2020-01-13 DIAGNOSIS — D69.6 THROMBOCYTOPENIA: ICD-10-CM

## 2020-01-13 DIAGNOSIS — D51.9 ANEMIA DUE TO VITAMIN B12 DEFICIENCY, UNSPECIFIED B12 DEFICIENCY TYPE: ICD-10-CM

## 2020-01-13 DIAGNOSIS — D62 ACUTE BLOOD LOSS ANEMIA: ICD-10-CM

## 2020-01-13 DIAGNOSIS — K59.00 CONSTIPATION, UNSPECIFIED CONSTIPATION TYPE: ICD-10-CM

## 2020-01-13 LAB
ALBUMIN SERPL BCP-MCNC: 3.9 G/DL (ref 3.5–5.2)
ALP SERPL-CCNC: 94 U/L (ref 38–126)
ALT SERPL W/O P-5'-P-CCNC: 14 U/L (ref 10–44)
ANION GAP SERPL CALC-SCNC: 8 MMOL/L (ref 8–16)
AST SERPL-CCNC: 27 U/L (ref 15–46)
BASOPHILS # BLD AUTO: 0.04 K/UL (ref 0–0.2)
BASOPHILS NFR BLD: 0.8 % (ref 0–1.9)
BILIRUB SERPL-MCNC: 0.5 MG/DL (ref 0.1–1)
BUN SERPL-MCNC: 10 MG/DL (ref 2–20)
CALCIUM SERPL-MCNC: 9 MG/DL (ref 8.7–10.5)
CHLORIDE SERPL-SCNC: 104 MMOL/L (ref 95–110)
CHOLEST SERPL-MCNC: 167 MG/DL (ref 120–199)
CHOLEST/HDLC SERPL: 3 {RATIO} (ref 2–5)
CO2 SERPL-SCNC: 30 MMOL/L (ref 23–29)
CREAT SERPL-MCNC: 1.17 MG/DL (ref 0.5–1.4)
DIFFERENTIAL METHOD: ABNORMAL
EOSINOPHIL # BLD AUTO: 0.2 K/UL (ref 0–0.5)
EOSINOPHIL NFR BLD: 4.4 % (ref 0–8)
ERYTHROCYTE [DISTWIDTH] IN BLOOD BY AUTOMATED COUNT: 16.6 % (ref 11.5–14.5)
EST. GFR  (AFRICAN AMERICAN): >60 ML/MIN/1.73 M^2
EST. GFR  (NON AFRICAN AMERICAN): 54.9 ML/MIN/1.73 M^2
FERRITIN SERPL-MCNC: 25 NG/ML (ref 20–300)
GLUCOSE SERPL-MCNC: 97 MG/DL (ref 70–110)
HCT VFR BLD AUTO: 41.5 % (ref 40–54)
HDLC SERPL-MCNC: 55 MG/DL (ref 40–75)
HDLC SERPL: 32.9 % (ref 20–50)
HGB BLD-MCNC: 12.2 G/DL (ref 14–18)
IMM GRANULOCYTES # BLD AUTO: 0.01 K/UL (ref 0–0.04)
IMM GRANULOCYTES NFR BLD AUTO: 0.2 % (ref 0–0.5)
LDLC SERPL CALC-MCNC: 97.2 MG/DL (ref 63–159)
LYMPHOCYTES # BLD AUTO: 1.2 K/UL (ref 1–4.8)
LYMPHOCYTES NFR BLD: 22.9 % (ref 18–48)
MAGNESIUM SERPL-MCNC: 2.2 MG/DL (ref 1.6–2.6)
MCH RBC QN AUTO: 23.9 PG (ref 27–31)
MCHC RBC AUTO-ENTMCNC: 29.4 G/DL (ref 32–36)
MCV RBC AUTO: 81 FL (ref 82–98)
MONOCYTES # BLD AUTO: 0.4 K/UL (ref 0.3–1)
MONOCYTES NFR BLD: 7.8 % (ref 4–15)
NEUTROPHILS # BLD AUTO: 3.3 K/UL (ref 1.8–7.7)
NEUTROPHILS NFR BLD: 63.9 % (ref 38–73)
NONHDLC SERPL-MCNC: 112 MG/DL
NRBC BLD-RTO: 0 /100 WBC
PLATELET # BLD AUTO: 146 K/UL (ref 150–350)
PMV BLD AUTO: 11.4 FL (ref 9.2–12.9)
POTASSIUM SERPL-SCNC: 3.7 MMOL/L (ref 3.5–5.1)
PROT SERPL-MCNC: 8.1 G/DL (ref 6–8.4)
RBC # BLD AUTO: 5.1 M/UL (ref 4.6–6.2)
SODIUM SERPL-SCNC: 142 MMOL/L (ref 136–145)
T4 FREE SERPL-MCNC: 1.08 NG/DL (ref 0.71–1.51)
TRIGL SERPL-MCNC: 74 MG/DL (ref 30–150)
TSH SERPL DL<=0.005 MIU/L-ACNC: 0.23 UIU/ML (ref 0.4–4)
WBC # BLD AUTO: 5.23 K/UL (ref 3.9–12.7)

## 2020-01-13 PROCEDURE — 80061 LIPID PANEL: CPT

## 2020-01-13 PROCEDURE — 82728 ASSAY OF FERRITIN: CPT

## 2020-01-13 PROCEDURE — 36415 COLL VENOUS BLD VENIPUNCTURE: CPT | Mod: PO

## 2020-01-13 PROCEDURE — 84443 ASSAY THYROID STIM HORMONE: CPT | Mod: PO

## 2020-01-13 PROCEDURE — 83540 ASSAY OF IRON: CPT | Mod: PO

## 2020-01-13 PROCEDURE — 85025 COMPLETE CBC W/AUTO DIFF WBC: CPT | Mod: PO

## 2020-01-13 PROCEDURE — 80053 COMPREHEN METABOLIC PANEL: CPT | Mod: PO

## 2020-01-13 PROCEDURE — 82607 VITAMIN B-12: CPT | Mod: PO

## 2020-01-13 PROCEDURE — 84439 ASSAY OF FREE THYROXINE: CPT

## 2020-01-13 PROCEDURE — 82306 VITAMIN D 25 HYDROXY: CPT | Mod: PO

## 2020-01-13 PROCEDURE — 83735 ASSAY OF MAGNESIUM: CPT | Mod: PO

## 2020-01-14 LAB
25(OH)D3+25(OH)D2 SERPL-MCNC: 58 NG/ML (ref 30–96)
IRON SERPL-MCNC: 27 UG/DL (ref 45–160)
SATURATED IRON: 7 % (ref 20–50)
TOTAL IRON BINDING CAPACITY: 370 UG/DL (ref 250–450)
TRANSFERRIN SERPL-MCNC: 250 MG/DL (ref 200–375)
VIT B12 SERPL-MCNC: 332 PG/ML (ref 210–950)

## 2020-03-13 DIAGNOSIS — E78.5 HYPERLIPIDEMIA, UNSPECIFIED HYPERLIPIDEMIA TYPE: ICD-10-CM

## 2020-03-13 DIAGNOSIS — N40.0 BENIGN NON-NODULAR PROSTATIC HYPERPLASIA WITHOUT LOWER URINARY TRACT SYMPTOMS: ICD-10-CM

## 2020-03-13 DIAGNOSIS — I10 BENIGN ESSENTIAL HYPERTENSION: ICD-10-CM

## 2020-03-13 RX ORDER — DILTIAZEM HYDROCHLORIDE 240 MG/1
240 CAPSULE, COATED, EXTENDED RELEASE ORAL DAILY
Qty: 90 CAPSULE | Refills: 3 | Status: SHIPPED | OUTPATIENT
Start: 2020-03-13 | End: 2020-06-04 | Stop reason: SDUPTHER

## 2020-03-13 RX ORDER — TAMSULOSIN HYDROCHLORIDE 0.4 MG/1
1 CAPSULE ORAL NIGHTLY
Qty: 90 CAPSULE | Refills: 4 | Status: SHIPPED | OUTPATIENT
Start: 2020-03-13 | End: 2020-06-04 | Stop reason: SDUPTHER

## 2020-03-13 RX ORDER — FINASTERIDE 5 MG/1
5 TABLET, FILM COATED ORAL DAILY
Qty: 90 TABLET | Refills: 4 | Status: SHIPPED | OUTPATIENT
Start: 2020-03-13 | End: 2020-06-04 | Stop reason: SDUPTHER

## 2020-03-13 RX ORDER — PRAVASTATIN SODIUM 80 MG/1
TABLET ORAL
Qty: 90 TABLET | Refills: 4 | Status: SHIPPED | OUTPATIENT
Start: 2020-03-13 | End: 2020-06-04 | Stop reason: SDUPTHER

## 2020-03-13 NOTE — TELEPHONE ENCOUNTER
----- Message from Sima Du sent at 3/13/2020  9:29 AM CDT -----  Contact: Pt's daughter Kerri [phone]  Patient's daughter requesting to speak with you regarding getting all pt's refill additional. Please advise.

## 2020-05-08 ENCOUNTER — TELEPHONE (OUTPATIENT)
Dept: FAMILY MEDICINE | Facility: CLINIC | Age: 85
End: 2020-05-08

## 2020-05-08 NOTE — TELEPHONE ENCOUNTER
----- Message from Ronnie Knox sent at 5/8/2020  2:33 PM CDT -----  Contact: Kerri/daughter  [phone]  Patient daughter would like to change 5-15-20 appointment to a virtual visit  Please advise

## 2020-05-08 NOTE — TELEPHONE ENCOUNTER
I returned patient's daughter call (Kerri) in regards to changing her father's appointment on 05/15/2020 to a virtual visit. Appointment is changed to virtual visit.

## 2020-05-15 ENCOUNTER — OFFICE VISIT (OUTPATIENT)
Dept: FAMILY MEDICINE | Facility: CLINIC | Age: 85
End: 2020-05-15
Payer: MEDICARE

## 2020-05-15 DIAGNOSIS — D51.9 ANEMIA DUE TO VITAMIN B12 DEFICIENCY, UNSPECIFIED B12 DEFICIENCY TYPE: ICD-10-CM

## 2020-05-15 DIAGNOSIS — F02.818 LATE ONSET ALZHEIMER'S DISEASE WITH BEHAVIORAL DISTURBANCE: Primary | ICD-10-CM

## 2020-05-15 DIAGNOSIS — G30.1 LATE ONSET ALZHEIMER'S DISEASE WITH BEHAVIORAL DISTURBANCE: Primary | ICD-10-CM

## 2020-05-15 DIAGNOSIS — K59.09 CHRONIC CONSTIPATION: ICD-10-CM

## 2020-05-15 DIAGNOSIS — K21.00 GASTROESOPHAGEAL REFLUX DISEASE WITH ESOPHAGITIS: ICD-10-CM

## 2020-05-15 DIAGNOSIS — E78.2 MIXED HYPERLIPIDEMIA: ICD-10-CM

## 2020-05-15 DIAGNOSIS — Z79.899 ENCOUNTER FOR MONITORING LONG-TERM PROTON PUMP INHIBITOR THERAPY: ICD-10-CM

## 2020-05-15 DIAGNOSIS — N40.0 BENIGN PROSTATIC HYPERPLASIA WITHOUT LOWER URINARY TRACT SYMPTOMS: ICD-10-CM

## 2020-05-15 DIAGNOSIS — Z51.81 ENCOUNTER FOR MONITORING LONG-TERM PROTON PUMP INHIBITOR THERAPY: ICD-10-CM

## 2020-05-15 DIAGNOSIS — E55.9 VITAMIN D DEFICIENCY: ICD-10-CM

## 2020-05-15 DIAGNOSIS — F10.11 HISTORY OF ALCOHOL ABUSE: ICD-10-CM

## 2020-05-15 DIAGNOSIS — Z79.899 MEDICATION MANAGEMENT: ICD-10-CM

## 2020-05-15 DIAGNOSIS — Z87.19 HISTORY OF GI BLEED: ICD-10-CM

## 2020-05-15 DIAGNOSIS — I47.10 SVT (SUPRAVENTRICULAR TACHYCARDIA): ICD-10-CM

## 2020-05-15 DIAGNOSIS — D69.6 THROMBOCYTOPENIA: ICD-10-CM

## 2020-05-15 DIAGNOSIS — I10 BENIGN ESSENTIAL HYPERTENSION: ICD-10-CM

## 2020-05-15 PROCEDURE — 99214 OFFICE O/P EST MOD 30 MIN: CPT | Mod: 95,,, | Performed by: FAMILY MEDICINE

## 2020-05-15 PROCEDURE — 99211 OFF/OP EST MAY X REQ PHY/QHP: CPT | Mod: PO

## 2020-05-15 PROCEDURE — 99214 PR OFFICE/OUTPT VISIT, EST, LEVL IV, 30-39 MIN: ICD-10-PCS | Mod: 95,,, | Performed by: FAMILY MEDICINE

## 2020-05-15 PROCEDURE — G0463 HOSPITAL OUTPT CLINIC VISIT: HCPCS | Mod: PO

## 2020-05-15 RX ORDER — DOCUSATE SODIUM 100 MG/1
100 CAPSULE, LIQUID FILLED ORAL 2 TIMES DAILY
Qty: 180 CAPSULE | Refills: 4 | Status: ON HOLD | OUTPATIENT
Start: 2020-05-15 | End: 2020-10-22

## 2020-05-15 NOTE — PROGRESS NOTES
Office Visit    Patient Name: Toby Green    : 1930  MRN: [id number]    Subjective:  Toby is a 89 y.o. male who presents today for:    No chief complaint on file.    The patient location is: THEIR HOME  The chief complaint leading to consultation is: FOLLOW UP DEMENTIA/ GENERAL HEALTH CONCERNS  Visit type: audiovisual  Total time spent with patient: start 845- end 9 am, TOTAL 15 minutes  Each patient to whom he or she provides medical services by telemedicine is:  (1) informed of the relationship between the physician and patient and the respective role of any other health care provider with respect to management of the patient; and (2) notified that he or she may decline to receive medical services by telemedicine and may withdraw from such care at any time.    89 yo patient of mine with chronic conditions that include dementia-- suspected Alzheimer's with associated severe hearing impairment and behavior disturbance (paranoia, wandering, mild combativeness), hypertension w/ H/O SVT, hyperlipidemia, GERD with history of GI bleed with prior h/o resulting iron deficiency anemia along with history of B12 deficiency associated with anemia, prior history of excessive alcohol consumption, chronic constipation WHO PRESENTS WITH HIS DAUGHTER AND WIFE FOR VIRTUAL VISIT FOR GENERAL HEALTH REVIEW.     MOST RECENT LABS 20 SHOWED:  no evidence of significant anemia(hct 41.5, ferritin 25, tibc WNL), thyroid dysfunction or vitamin deficiency. Liver/kidney function and blood sugar all normal. PPI labs WNL (mg/vit d/b12)    He reports no acute physical complaints other than ongoing chronic constipation.  His diet is poor.  His daughter reports that even though she is now cooking healthy meals for her parents, her father is picky and eats lots of snack foods/junk foods.  He reports drinking fair amount of water but does not take his Colace stool softener every day as prescribed.  He is no longer drinking alcohol  as due to his dementia/health concerns he has had his driving privileges revoked and he does not leave the house unsupervised.  No concern for GI bleed/dark stools.  He continues to have a severe hearing impairment for which he is  totally resistant to treatment/hearing aids.    He is living in the home setting with his wife and intensive caregiver support from his daughter.  They report that this is actually going better.  At 1 point in the past I had written for a social work consult to discuss long-term care options and this was definitely on the table, however, they report that with his daughter changing her work schedule and being around more things are okay in the home setting at this time.  He is independent with ADLs and has generally good mobility.  He does not consistently take his Aricept or sleeping pill.  His daughter is considering a pill pack and pharmacy concept that should improve his compliance with medications and hopefully keep him sleeping on a more regular schedule and not wandering around the house.      Past Medical History  Past Medical History:   Diagnosis Date    Benign essential hypertension     Borderline glaucoma, open angle with borderline findings 10/19/2012    Constipation 5/23/2014    Erectile dysfunction     GERD (gastroesophageal reflux disease)     History of GI bleed 12/12/2016    Hyperlipidemia     Iron deficiency anemia 12/13/2016    Nuclear sclerosis 10/19/2012       Past Surgical History  Past Surgical History:   Procedure Laterality Date    CATARACT EXTRACTION W/  INTRAOCULAR LENS IMPLANT  12/5/12    OD w/     COLONOSCOPY N/A 10/31/2017    Procedure: COLONOSCOPY;  Surgeon: Oswaldo Zee MD;  Location: [facility];  Service: Endoscopy;  Laterality: N/A;    lump in back removed  2009    lump removal      head    PARS PLANA VITRECTOMY  12/11/12    OD w/ dr. tavarez    RETINAL DETACHMENT SURGERY  3/26/13    Right eye    TONSILLECTOMY         Family  History  Family History   Problem Relation Age of Onset    Cancer Mother     Diabetes Mother     Hypertension Father     Stroke Father     Cancer Sister     Cancer Brother     Cataracts Paternal Grandmother     Glaucoma Paternal Grandmother     Hypertension Paternal Grandmother     Amblyopia Neg Hx     Blindness Neg Hx     Macular degeneration Neg Hx     Retinal detachment Neg Hx     Strabismus Neg Hx     Thyroid disease Neg Hx        Social History  Social History     Socioeconomic History    Marital status:      Spouse name: Not on file    Number of children: Not on file    Years of education: Not on file    Highest education level: Not on file   Occupational History    Not on file   Social Needs    Financial resource strain: Not on file    Food insecurity:     Worry: Not on file     Inability: Not on file    Transportation needs:     Medical: Not on file     Non-medical: Not on file   Tobacco Use    Smoking status: Former Smoker    Smokeless tobacco: Never Used   Substance and Sexual Activity    Alcohol use: Yes     Alcohol/week: 0.8 standard drinks     Types: 1 Standard drinks or equivalent per week     Comment: soically    Drug use: No    Sexual activity: Not on file   Lifestyle    Physical activity:     Days per week: Not on file     Minutes per session: Not on file    Stress: Not on file   Relationships    Social connections:     Talks on phone: Not on file     Gets together: Not on file     Attends Orthodox service: Not on file     Active member of club or organization: Not on file     Attends meetings of clubs or organizations: Not on file     Relationship status: Not on file   Other Topics Concern    Not on file   Social History Narrative    Wife -Ada       Current Medications  Medications reviewed and updated.     Allergies   Review of patient's allergies indicates:   Allergen Reactions    Naproxen      Other reaction(s): bleeding    Naproxen sodium Other (See  Comments)     Other reaction(s): stomch bleeding    Ticlopidine Other (See Comments)     Other reaction(s): extreme bleeding       Review of Systems (Pertinent positives)  Review of Systems   Constitutional: Positive for activity change. Negative for unexpected weight change.   HENT: Positive for hearing loss, rhinorrhea and trouble swallowing.    Eyes: Positive for discharge. Negative for visual disturbance.   Respiratory: Negative for chest tightness and wheezing.    Cardiovascular: Negative for chest pain and palpitations.   Gastrointestinal: Positive for constipation. Negative for blood in stool, diarrhea and vomiting.   Endocrine: Negative for polydipsia and polyuria.   Genitourinary: Negative for difficulty urinating, hematuria and urgency.   Musculoskeletal: Negative for arthralgias, joint swelling and neck pain.   Neurological: Negative for weakness and headaches.   Psychiatric/Behavioral: Positive for confusion and dysphoric mood.       There were no vitals taken for this visit.    Physical Exam   Constitutional: He appears well-developed and well-nourished. No distress.   HENT:   Head: Normocephalic and atraumatic.   Eyes: Conjunctivae are normal.   Pulmonary/Chest: Effort normal.   Neurological: He is alert.   Psychiatric: He is withdrawn (LIKELY IN PART SECONDARY TO VERY POOR HEARING).         Assessment/Plan:  Toby Green is a 89 y.o. male who presents today for :    Diagnoses and all orders for this visit:    Late onset Alzheimer's disease with behavioral disturbance  Comments:  currently ok at home w/ intensive caregiver support from his dtr. continue Aricept, sleep aid prn, s/p social work eval for discussion of long term care options  Orders:  -     Comprehensive metabolic panel; Future  -     Lipid Panel; Future  -     CBC auto differential; Future  -     TSH; Future  -     Vitamin D; Future  -     Vitamin B12; Future  -     Magnesium; Future  -     Prealbumin; Future    Gastroesophageal  reflux disease with esophagitis  Comments:  continue daily omeprazole, monitor PPI labs    History of GI bleed  Comments:  on chronic PPI and has stopped drinking alcohol. blood counts monitored and stable/ WNL    Encounter for monitoring long-term proton pump inhibitor therapy  -     Comprehensive metabolic panel; Future  -     Vitamin D; Future  -     Vitamin B12; Future  -     Magnesium; Future    Vitamin D deficiency  -     Vitamin D; Future    Anemia due to vitamin B12 deficiency, unspecified B12 deficiency type  -     CBC auto differential; Future  -     Vitamin B12; Future    Chronic constipation  Comments:  poor diet, daughter trying to cook healthy meals but he is picky. advised on hydration and daily colace stool softener for improved mgmt  Orders:  -     docusate sodium (COLACE) 100 MG capsule; Take 1 capsule (100 mg total) by mouth 2 (two) times daily.  -     Comprehensive metabolic panel; Future  -     TSH; Future  -     Magnesium; Future    Benign prostatic hyperplasia without lower urinary tract symptoms  Comments:  stable on Flomax, Finasteride, no urinary incontinenece reported    SVT (supraventricular tachycardia)  Comments:  BP/ Heart rate is stable on Cardizem 240 daily. he has poor medication compliance but family ensures that he at least takes this every day  Orders:  -     Comprehensive metabolic panel; Future  -     CBC auto differential; Future  -     TSH; Future  -     Magnesium; Future    Benign essential hypertension    Thrombocytopenia  Comments:  now very mild, improved with alcohol cessation     Mixed hyperlipidemia  Comments:  continue daily Pravachol, check lipids w/ upcoming labs  Orders:  -     Comprehensive metabolic panel; Future  -     Lipid Panel; Future    History of alcohol abuse  Comments:  no longer driving/ getting out the house unsupervised, so he has lost his acess to alcohol    Medication management  -     docusate sodium (COLACE) 100 MG capsule; Take 1 capsule (100 mg  total) by mouth 2 (two) times daily.  -     Comprehensive metabolic panel; Future  -     Lipid Panel; Future  -     CBC auto differential; Future  -     TSH; Future  -     Vitamin D; Future  -     Vitamin B12; Future  -     Magnesium; Future  -     Prealbumin; Future            ICD-10-CM ICD-9-CM    1. Late onset Alzheimer's disease with behavioral disturbance G30.1 331.0 Comprehensive metabolic panel    F02.81 294.11 Lipid Panel      CBC auto differential      TSH      Vitamin D      Vitamin B12      Magnesium      Prealbumin    currently ok at home w/ intensive caregiver support from his dtr. continue Aricept, sleep aid prn, s/p social work eval for discussion of long term care options   2. Gastroesophageal reflux disease with esophagitis K21.0 530.11     continue daily omeprazole, monitor PPI labs   3. History of GI bleed Z87.19 V12.79     on chronic PPI and has stopped drinking alcohol. blood counts monitored and stable/ WNL   4. Encounter for monitoring long-term proton pump inhibitor therapy Z51.81 V58.83 Comprehensive metabolic panel    Z79.899 V58.69 Vitamin D      Vitamin B12      Magnesium   5. Vitamin D deficiency E55.9 268.9 Vitamin D   6. Anemia due to vitamin B12 deficiency, unspecified B12 deficiency type D51.9 281.1 CBC auto differential      Vitamin B12   7. Chronic constipation K59.09 564.00 docusate sodium (COLACE) 100 MG capsule      Comprehensive metabolic panel      TSH      Magnesium    poor diet, daughter trying to cook healthy meals but he is picky. advised on hydration and daily colace stool softener for improved mgmt   8. Benign prostatic hyperplasia without lower urinary tract symptoms N40.0 600.00     stable on Flomax, Finasteride, no urinary incontinenece reported   9. SVT (supraventricular tachycardia) I47.1 427.89 Comprehensive metabolic panel      CBC auto differential      TSH      Magnesium    BP/ Heart rate is stable on Cardizem 240 daily. he has poor medication compliance but  family ensures that he at least takes this every day   10. Benign essential hypertension I10 401.1    11. Thrombocytopenia D69.6 287.5     now very mild, improved with alcohol cessation    12. Mixed hyperlipidemia E78.2 272.2 Comprehensive metabolic panel      Lipid Panel    continue daily Pravachol, check lipids w/ upcoming labs   13. History of alcohol abuse F10.11 305.03     no longer driving/ getting out the house unsupervised, so he has lost his acess to alcohol   14. Medication management Z79.899 V58.69 docusate sodium (COLACE) 100 MG capsule      Comprehensive metabolic panel      Lipid Panel      CBC auto differential      TSH      Vitamin D      Vitamin B12      Magnesium      Prealbumin       There are no Patient Instructions on file for this visit.      Follow up in about 6 weeks (around 6/26/2020) for to follow up on lab results, return as needed for new concerns.

## 2020-06-04 DIAGNOSIS — N40.0 BENIGN NON-NODULAR PROSTATIC HYPERPLASIA WITHOUT LOWER URINARY TRACT SYMPTOMS: ICD-10-CM

## 2020-06-04 DIAGNOSIS — I10 BENIGN ESSENTIAL HYPERTENSION: ICD-10-CM

## 2020-06-04 DIAGNOSIS — E78.5 HYPERLIPIDEMIA, UNSPECIFIED HYPERLIPIDEMIA TYPE: ICD-10-CM

## 2020-06-04 RX ORDER — PRAVASTATIN SODIUM 80 MG/1
TABLET ORAL
Qty: 90 TABLET | Refills: 4 | Status: SHIPPED | OUTPATIENT
Start: 2020-06-04

## 2020-06-04 RX ORDER — FINASTERIDE 5 MG/1
5 TABLET, FILM COATED ORAL DAILY
Qty: 90 TABLET | Refills: 4 | Status: SHIPPED | OUTPATIENT
Start: 2020-06-04

## 2020-06-04 RX ORDER — TAMSULOSIN HYDROCHLORIDE 0.4 MG/1
1 CAPSULE ORAL NIGHTLY
Qty: 90 CAPSULE | Refills: 4 | Status: SHIPPED | OUTPATIENT
Start: 2020-06-04

## 2020-06-04 RX ORDER — DILTIAZEM HYDROCHLORIDE 240 MG/1
240 CAPSULE, COATED, EXTENDED RELEASE ORAL DAILY
Qty: 90 CAPSULE | Refills: 4 | Status: ON HOLD | OUTPATIENT
Start: 2020-06-04 | End: 2020-10-22 | Stop reason: HOSPADM

## 2020-06-04 NOTE — TELEPHONE ENCOUNTER
----- Message from Shari Price sent at 6/4/2020  8:18 AM CDT -----  Contact: Bhumika [phone] option 3  Pharmacy would like to receive an authorization on a refill for diltiaZEM (CARDIZEM CD) 240 MG 24 hr capsule, finasteride (PROSCAR) 5 mg tablet, pravastatin (PRAVACHOL) 80 MG tablet, tamsulosin (FLOMAX) 0.4 mg Cap. Please advise.

## 2020-06-09 NOTE — LETTER
Hpi Title: Evaluation of Skin Lesions January 24, 2019    Toby Green  [address] [address]             [facility] [address] [place] [address] Dear: Toby Green     I am writing from the Outpatient Complex Care Management Department at [facility].  I received a referral from Dr. Lindsey Du to contact you or your caregiver regarding any needs you may have. I have attempted to contact you or your cargeiver by phone two times unsuccessfully.  Please contact the Outpatient Complex Care Management Department at [phone] if you would like to discuss your needs.      Sincerely,         Roseanna Richey LMSW            How Severe Are Your Spot(S)?: moderate

## 2020-06-10 ENCOUNTER — HOSPITAL ENCOUNTER (EMERGENCY)
Facility: HOSPITAL | Age: 85
Discharge: HOME | End: 2020-06-10
Attending: EMERGENCY MEDICINE
Payer: MEDICARE

## 2020-06-10 VITALS
BODY MASS INDEX: 20.97 KG/M2 | RESPIRATION RATE: 18 BRPM | TEMPERATURE: 98 F | WEIGHT: 142 LBS | SYSTOLIC BLOOD PRESSURE: 130 MMHG | DIASTOLIC BLOOD PRESSURE: 70 MMHG | OXYGEN SATURATION: 99 % | HEART RATE: 70 BPM

## 2020-06-10 DIAGNOSIS — M25.552 LEFT HIP PAIN: Primary | ICD-10-CM

## 2020-06-10 DIAGNOSIS — M79.18 BUTTOCK PAIN: ICD-10-CM

## 2020-06-10 PROCEDURE — 99283 EMERGENCY DEPT VISIT LOW MDM: CPT | Mod: 25,ER

## 2020-06-10 NOTE — ED PROVIDER NOTES
"Encounter Date: 6/10/2020       History     Chief Complaint   Patient presents with    Hip Pain     c/o left hip and buttock pain after falling last night while walking dog. Daughter states pt fell onto concrete. Pt did not hit head. No LOC. No shortening or rotation noted. Pt able to bear weight.      Pt is a 89 yo AAM who presents to the ED with the complaint of L hip and L buttock pain s/p a mechanical fall yesterday. The patient (and the daughter at bedside) stated to me that the patient was walking his dog last evening when the "dog got behind me" causing the patient to fall on his L hip and buttock. He denies hitting his head or any LOC. The patient now complains of L hip and L buttock pain. The patient was ambulatory at the scene and has been ambulatory today. The patient took PO Mobic this AM with improvement of his symptoms.         Review of patient's allergies indicates:   Allergen Reactions    Naproxen      Other reaction(s): bleeding    Naproxen sodium Other (See Comments)     Other reaction(s): stomch bleeding    Ticlopidine Other (See Comments)     Other reaction(s): extreme bleeding     Past Medical History:   Diagnosis Date    Benign essential hypertension     Borderline glaucoma, open angle with borderline findings 10/19/2012    Constipation 5/23/2014    Erectile dysfunction     GERD (gastroesophageal reflux disease)     History of GI bleed 12/12/2016    Hyperlipidemia     Iron deficiency anemia 12/13/2016    Nuclear sclerosis 10/19/2012     Past Surgical History:   Procedure Laterality Date    CATARACT EXTRACTION W/  INTRAOCULAR LENS IMPLANT  12/5/12    OD w/     COLONOSCOPY N/A 10/31/2017    Procedure: COLONOSCOPY;  Surgeon: Oswaldo Zee MD;  Location: [facility];  Service: Endoscopy;  Laterality: N/A;    lump in back removed  2009    lump removal      head    PARS PLANA VITRECTOMY  12/11/12    OD w/ dr. tavarez    RETINAL DETACHMENT SURGERY  3/26/13    Right eye "    TONSILLECTOMY       Family History   Problem Relation Age of Onset    Cancer Mother     Diabetes Mother     Hypertension Father     Stroke Father     Cancer Sister     Cancer Brother     Cataracts Paternal Grandmother     Glaucoma Paternal Grandmother     Hypertension Paternal Grandmother     Amblyopia Neg Hx     Blindness Neg Hx     Macular degeneration Neg Hx     Retinal detachment Neg Hx     Strabismus Neg Hx     Thyroid disease Neg Hx      Social History     Tobacco Use    Smoking status: Former Smoker    Smokeless tobacco: Never Used   Substance Use Topics    Alcohol use: Yes     Alcohol/week: 0.8 standard drinks     Types: 1 Standard drinks or equivalent per week     Comment: soically    Drug use: No     Review of Systems   Constitutional: Negative for chills and fatigue.   HENT: Negative for congestion and sinus pain.    Respiratory: Negative for cough and shortness of breath.    Cardiovascular: Negative for chest pain, palpitations and leg swelling.   Gastrointestinal: Negative for nausea and vomiting.   Genitourinary: Negative for dysuria and flank pain.   Musculoskeletal: Positive for arthralgias. Negative for back pain, gait problem, neck pain and neck stiffness.   Neurological: Negative for dizziness, tremors, facial asymmetry, weakness and headaches.   Psychiatric/Behavioral: Negative for agitation and confusion.       Physical Exam     Initial Vitals [06/10/20 1013]   BP Pulse Resp Temp SpO2   (!) 140/68 78 18 98.2 °F (36.8 °C) 98 %      MAP       --         Physical Exam    Constitutional: He appears well-developed and well-nourished.   Well-appearing, elderly male in no acute distress.    HENT:   Head: Normocephalic and atraumatic.   No signs of head trauma.    Cardiovascular: Normal rate, regular rhythm, normal heart sounds and intact distal pulses.   Pulmonary/Chest: Breath sounds normal.   Abdominal: Soft. Bowel sounds are normal.   Musculoskeletal:        Right hip: He  exhibits normal range of motion, normal strength, no tenderness and no bony tenderness.        Left hip: He exhibits bony tenderness. He exhibits normal range of motion, normal strength, no tenderness, no swelling, no crepitus, no deformity and no laceration.        Right knee: Normal.        Left knee: Normal.        Lumbar back: He exhibits normal range of motion, no tenderness, no bony tenderness and no swelling.        Left upper leg: He exhibits no tenderness, no bony tenderness, no swelling and no edema.   Tenderness to L hip and coccyx region; LLE is neither shortened, abducted or adducted; LLE is neurovascularly in tact; distal pulse is 2+; sensation WNL; strength WNL; pelvis stable   Neurological: He is alert and oriented to person, place, and time. He has normal strength and normal reflexes.         ED Course   Procedures  Labs Reviewed - No data to display       Imaging Results          X-Ray Sacrum And Coccyx (Final result)  Result time 06/10/20 11:44:45    Final result by Matteo Sagastume MD (06/10/20 11:44:45)                 Impression:      No acute findings.  Severe degenerative disc disease at L4-5 and L5-S1.      Electronically signed by: Matteo Sagastume MD  Date:    06/10/2020  Time:    11:44             Narrative:    EXAMINATION:  XR SACRUM AND COCCYX    CLINICAL HISTORY:  - Myalgia, other site.  Sacrococcygeal pain    COMPARISON:  None    FINDINGS:  Severe degenerative disc disease at L4-5 and L5-S1 with facet DJD.  The sacroiliac joints are within normal limits for age.  No acute fracture detected.                               X-Ray Hips Bilateral 2 View Incl AP Pelvis (Final result)  Result time 06/10/20 11:09:38    Final result by Jerry Espitia MD (06/10/20 11:09:38)                 Impression:      Minor bilateral hip DJD.  No acute findings.    Lumbar degenerative disc disease.      Electronically signed by: Jerry Espitia MD  Date:    06/10/2020  Time:    11:09             Narrative:     EXAMINATION:  XR HIPS BILATERAL 2 VIEW INCL AP PELVIS    CLINICAL HISTORY:  Pain in left hip,    TECHNIQUE:  Standard radiography performed.    COMPARISON:  None    FINDINGS:  Lumbar degenerative disc disease is noted.    The right and left hips reveal mild joint space narrowing with mild marginal acetabular spurring.    No acute findings.                                 Medical Decision Making:   Clinical Tests:   Radiological Study: Ordered and Reviewed  ED Management:  - plain radiograph of hip/pelvis negative for acute fracture, dislocation or other osseous abnormality per my interpretation, final radiology read  - plain radiograph of coccyx and sacrum negative for acute fracture, dislocation or other osseous abnormality per my interpretation, final radiology read  - will avoid use of NSAIDs in pt with hx of GI ulcers; will recommend APAP prn per dosage instructions  - pt stable for discharge at this time  - No further intervention is indicated at this time after having taken into account the patient's history, physical exam findings, and empirical and objective data obtained during the patient's emergency department workup.   - The patient is at low risk for an emergent medical condition at this time, and I am of the belief that that it is safe to discharge the patient from the emergency department.   - The patient is instructed to follow up as outpatient as indicated on the discharge paperwork.    - I have discussed the specifics of the workup with the patient and the patient has verbalized understanding of the details of the workup, the diagnosis, the treatment plan, and the need for outpatient follow-up.    - Although the patient has no emergent etiology today this does not preclude the development of an emergent condition so, in addition, I have advised the patient that they can return to the ED and/or activate EMS at any time with worsening of their symptoms, change of their symptoms, or with any other  medical complaint.    - The patient remained comfortable and stable during their visit in the ED.    - Discharge and follow-up instructions discussed with the patient who expressed understanding and willingness to comply with my recommendations.  - Results of all emergency department tests  discussed thoroughly with patient; all patient questions answered; pt in agreement with plan  - Pt instructed to follow up with PCP in 2-3 days for recheck of today's complaints  - Pt given strict emergency department return precautions for any new or worsening of symptoms  - Pt discharged from the emergency department in stable condition, in no acute distress                                   Clinical Impression:       ICD-10-CM ICD-9-CM   1. Left hip pain M25.552 719.45   2. Buttock pain M79.18 729.1             ED Disposition Condition    Discharge Stable        ED Prescriptions     None        Follow-up Information     Follow up With Specialties Details Why Contact Info    [facility] Emergency Medicine  If symptoms worsen [address] [facility] [facility] [id number] [phone]    Lindsey Du MD Family Medicine  Follow up with your primary care physician in the next 2-3 days [address]  [address]  [facility] [address]  [phone]                                       Sebastian Cox MD  06/10/20 0707

## 2020-06-10 NOTE — ED NOTES
Family stated , patient went for a walk yesterday evening at 7:30pm outside and he was tripped by a dog. Family put ice on left hip. He has decreased mobility and pain to left hip.

## 2020-06-12 ENCOUNTER — TELEPHONE (OUTPATIENT)
Dept: FAMILY MEDICINE | Facility: CLINIC | Age: 85
End: 2020-06-12

## 2020-06-12 DIAGNOSIS — N40.0 BENIGN NON-NODULAR PROSTATIC HYPERPLASIA WITHOUT LOWER URINARY TRACT SYMPTOMS: ICD-10-CM

## 2020-06-12 DIAGNOSIS — E78.5 HYPERLIPIDEMIA, UNSPECIFIED HYPERLIPIDEMIA TYPE: ICD-10-CM

## 2020-06-12 DIAGNOSIS — I10 BENIGN ESSENTIAL HYPERTENSION: ICD-10-CM

## 2020-06-27 ENCOUNTER — HOSPITAL ENCOUNTER (EMERGENCY)
Facility: HOSPITAL | Age: 85
Discharge: HOME | End: 2020-06-27
Attending: EMERGENCY MEDICINE
Payer: MEDICARE

## 2020-06-27 VITALS
BODY MASS INDEX: 19.79 KG/M2 | SYSTOLIC BLOOD PRESSURE: 117 MMHG | TEMPERATURE: 99 F | HEART RATE: 61 BPM | WEIGHT: 138.25 LBS | RESPIRATION RATE: 18 BRPM | HEIGHT: 70 IN | DIASTOLIC BLOOD PRESSURE: 64 MMHG | OXYGEN SATURATION: 96 %

## 2020-06-27 DIAGNOSIS — S76.211A GROIN STRAIN, RIGHT, INITIAL ENCOUNTER: Primary | ICD-10-CM

## 2020-06-27 PROCEDURE — 99282 EMERGENCY DEPT VISIT SF MDM: CPT | Mod: ER

## 2020-06-28 NOTE — ED PROVIDER NOTES
Encounter Date: 6/27/2020       History     Chief Complaint   Patient presents with    Leg Pain     Pt reports right leg pain X 1-2 weeks. Pts grandson states pt fell about a month ago. Deneis cough, chest pain, SOB or edema.      Patient currently presents with a chief complaint of injury to the RIGHT groin.  This was acquired about a week ago as a result of getting out of bed and abducting the hip quickly.  Patient notes associated symptoms of tenderness and intermittent shooting pains.  Denies associated loss of ROM or sensation.  He remains ambulatory.  Notes he has minimal pain at present.        Review of patient's allergies indicates:   Allergen Reactions    Naproxen      Other reaction(s): bleeding    Naproxen sodium Other (See Comments)     Other reaction(s): stomch bleeding    Ticlopidine Other (See Comments)     Other reaction(s): extreme bleeding     Past Medical History:   Diagnosis Date    Benign essential hypertension     Borderline glaucoma, open angle with borderline findings 10/19/2012    Constipation 5/23/2014    Erectile dysfunction     GERD (gastroesophageal reflux disease)     History of GI bleed 12/12/2016    Hyperlipidemia     Iron deficiency anemia 12/13/2016    Nuclear sclerosis 10/19/2012     Past Surgical History:   Procedure Laterality Date    CATARACT EXTRACTION W/  INTRAOCULAR LENS IMPLANT  12/5/12    OD w/     COLONOSCOPY N/A 10/31/2017    Procedure: COLONOSCOPY;  Surgeon: Oswaldo Zee MD;  Location[facility];  Service: Endoscopy;  Laterality: N/A;    lump in back removed  2009    lump removal      head    PARS PLANA VITRECTOMY  12/11/12    OD w/ dr. tavarez    RETINAL DETACHMENT SURGERY  3/26/13    Right eye    TONSILLECTOMY       Family History   Problem Relation Age of Onset    Cancer Mother     Diabetes Mother     Hypertension Father     Stroke Father     Cancer Sister     Cancer Brother     Cataracts Paternal Grandmother     Glaucoma  Paternal Grandmother     Hypertension Paternal Grandmother     Amblyopia Neg Hx     Blindness Neg Hx     Macular degeneration Neg Hx     Retinal detachment Neg Hx     Strabismus Neg Hx     Thyroid disease Neg Hx      Social History     Tobacco Use    Smoking status: Former Smoker    Smokeless tobacco: Never Used   Substance Use Topics    Alcohol use: Yes     Alcohol/week: 0.8 standard drinks     Types: 1 Standard drinks or equivalent per week     Comment: soically    Drug use: No     Review of Systems   Constitutional: Negative for chills and fever.   HENT: Negative for congestion.    Respiratory: Negative for chest tightness and shortness of breath.    Cardiovascular: Negative for chest pain and leg swelling.   Gastrointestinal: Negative for abdominal pain, constipation, diarrhea, nausea and vomiting.   Genitourinary: Negative for dysuria, frequency and urgency.   Skin: Negative for color change and rash.   Allergic/Immunologic: Negative for immunocompromised state.   Neurological: Negative for weakness and numbness.   Hematological: Negative for adenopathy. Does not bruise/bleed easily.   All other systems reviewed and are negative.      Physical Exam     Initial Vitals [06/27/20 1947]   BP Pulse Resp Temp SpO2   117/64 66 18 98.5 °F (36.9 °C) 96 %      MAP       --         Physical Exam    Constitutional: He appears well-developed and well-nourished. He is not diaphoretic. No distress.   HENT:   Head: Normocephalic and atraumatic.   Cardiovascular: Normal rate, regular rhythm, normal heart sounds and intact distal pulses.   Pulmonary/Chest: Breath sounds normal. No respiratory distress.   Abdominal: Hernia confirmed negative in the right inguinal area and confirmed negative in the left inguinal area.   Musculoskeletal:      Right upper leg: He exhibits tenderness. He exhibits no bony tenderness, no swelling, no edema and no deformity.        Legs:    Neurological: He is alert and oriented to person,  place, and time.   Skin: Skin is warm and dry.   Psychiatric: He has a normal mood and affect. His behavior is normal.         ED Course   Procedures  Labs Reviewed - No data to display       Imaging Results    None          Medical Decision Making:   ED Management:  All findings were reviewed with the patient/family in detail.  I see no indication of an emergent process beyond that addressed during our encounter but have duly counseled the patient/family regarding the need for prompt follow-up as well as the indications that should prompt immediate return to the emergency room should new or worrisome developments occur.  The patient has additionally been provided with printed information regarding diagnosis as well as instructions regarding follow up and any medications intended to manage the patient's aforementioned conditions.  The patient/family communicates understanding of all this information and all remaining questions and concerns were addressed at this time.                                       Clinical Impression:       ICD-10-CM ICD-9-CM   1. Groin strain, right, initial encounter  S76.211A 848.8                                Misael Jin MD  06/27/20 2007

## 2020-06-28 NOTE — ED NOTES
Patient reports a month ago a dog was jumping on left leg and he had xrays of left leg. Two days ago feeling pain right leg while getting out of bed.

## 2020-06-29 NOTE — ANESTHESIA RELEASE NOTE
"Anesthesia Release from PACU Note    Patient: Toby Green    Procedure(s) Performed: Procedure(s) (LRB):  ESOPHAGOGASTRODUODENOSCOPY (EGD) (N/A)    Anesthesia type: MAC    Post pain: Adequate analgesia    Post assessment: no apparent anesthetic complications    Last Vitals:   Visit Vitals  BP (!) 170/83   Pulse 99   Temp 35.7 °C (96.3 °F) (Oral)   Resp 20   Ht 5' 9" (1.753 m)   Wt 69.5 kg (153 lb 3.5 oz)   SpO2 95%   BMI 22.63 kg/m²       Post vital signs: stable    Level of consciousness: awake    Nausea/Vomiting: no nausea/no vomiting    Complications: none    Airway Patency: patent    Respiratory: unassisted, spontaneous ventilation    Cardiovascular: stable and blood pressure at baseline    Hydration: euvolemic  "
No

## 2020-07-17 DIAGNOSIS — Z71.89 COMPLEX CARE COORDINATION: ICD-10-CM

## 2020-07-27 ENCOUNTER — LAB VISIT (OUTPATIENT)
Dept: LAB | Facility: HOSPITAL | Age: 85
End: 2020-07-27
Attending: FAMILY MEDICINE
Payer: MEDICARE

## 2020-07-27 ENCOUNTER — OFFICE VISIT (OUTPATIENT)
Dept: FAMILY MEDICINE | Facility: CLINIC | Age: 85
End: 2020-07-27
Payer: MEDICARE

## 2020-07-27 VITALS
BODY MASS INDEX: 19.63 KG/M2 | OXYGEN SATURATION: 97 % | DIASTOLIC BLOOD PRESSURE: 83 MMHG | SYSTOLIC BLOOD PRESSURE: 142 MMHG | HEART RATE: 108 BPM | HEIGHT: 70 IN | WEIGHT: 137.13 LBS

## 2020-07-27 DIAGNOSIS — E55.9 VITAMIN D DEFICIENCY: ICD-10-CM

## 2020-07-27 DIAGNOSIS — N40.0 BENIGN PROSTATIC HYPERPLASIA WITHOUT LOWER URINARY TRACT SYMPTOMS: ICD-10-CM

## 2020-07-27 DIAGNOSIS — I47.10 SVT (SUPRAVENTRICULAR TACHYCARDIA): ICD-10-CM

## 2020-07-27 DIAGNOSIS — K59.09 CHRONIC CONSTIPATION: ICD-10-CM

## 2020-07-27 DIAGNOSIS — Z79.899 MEDICATION MANAGEMENT: ICD-10-CM

## 2020-07-27 DIAGNOSIS — D51.9 ANEMIA DUE TO VITAMIN B12 DEFICIENCY, UNSPECIFIED B12 DEFICIENCY TYPE: ICD-10-CM

## 2020-07-27 DIAGNOSIS — Z79.899 ENCOUNTER FOR MONITORING LONG-TERM PROTON PUMP INHIBITOR THERAPY: ICD-10-CM

## 2020-07-27 DIAGNOSIS — D69.6 THROMBOCYTOPENIA: ICD-10-CM

## 2020-07-27 DIAGNOSIS — H25.13 NUCLEAR SCLEROSIS OF BOTH EYES: ICD-10-CM

## 2020-07-27 DIAGNOSIS — I10 BENIGN ESSENTIAL HYPERTENSION: ICD-10-CM

## 2020-07-27 DIAGNOSIS — R41.89 COGNITIVE IMPAIRMENT: ICD-10-CM

## 2020-07-27 DIAGNOSIS — F10.11 HISTORY OF ALCOHOL ABUSE: ICD-10-CM

## 2020-07-27 DIAGNOSIS — K21.00 GASTROESOPHAGEAL REFLUX DISEASE WITH ESOPHAGITIS: ICD-10-CM

## 2020-07-27 DIAGNOSIS — F02.818 LATE ONSET ALZHEIMER'S DISEASE WITH BEHAVIORAL DISTURBANCE: ICD-10-CM

## 2020-07-27 DIAGNOSIS — K59.00 CONSTIPATION, UNSPECIFIED CONSTIPATION TYPE: ICD-10-CM

## 2020-07-27 DIAGNOSIS — E78.2 MIXED HYPERLIPIDEMIA: ICD-10-CM

## 2020-07-27 DIAGNOSIS — H91.90 HEARING LOSS, UNSPECIFIED HEARING LOSS TYPE, UNSPECIFIED LATERALITY: ICD-10-CM

## 2020-07-27 DIAGNOSIS — G47.00 INSOMNIA, UNSPECIFIED TYPE: ICD-10-CM

## 2020-07-27 DIAGNOSIS — G30.1 LATE ONSET ALZHEIMER'S DISEASE WITH BEHAVIORAL DISTURBANCE: Primary | ICD-10-CM

## 2020-07-27 DIAGNOSIS — Z87.19 HISTORY OF GI BLEED: ICD-10-CM

## 2020-07-27 DIAGNOSIS — G30.1 LATE ONSET ALZHEIMER'S DISEASE WITH BEHAVIORAL DISTURBANCE: ICD-10-CM

## 2020-07-27 DIAGNOSIS — F02.818 LATE ONSET ALZHEIMER'S DISEASE WITH BEHAVIORAL DISTURBANCE: Primary | ICD-10-CM

## 2020-07-27 DIAGNOSIS — Z51.81 ENCOUNTER FOR MONITORING LONG-TERM PROTON PUMP INHIBITOR THERAPY: ICD-10-CM

## 2020-07-27 LAB
25(OH)D3+25(OH)D2 SERPL-MCNC: 50 NG/ML (ref 30–96)
ALBUMIN SERPL BCP-MCNC: 3.6 G/DL (ref 3.5–5.2)
ALP SERPL-CCNC: 65 U/L (ref 55–135)
ALT SERPL W/O P-5'-P-CCNC: 10 U/L (ref 10–44)
ANION GAP SERPL CALC-SCNC: 8 MMOL/L (ref 8–16)
AST SERPL-CCNC: 17 U/L (ref 10–40)
BASOPHILS # BLD AUTO: 0.06 K/UL (ref 0–0.2)
BASOPHILS NFR BLD: 1 % (ref 0–1.9)
BILIRUB SERPL-MCNC: 0.5 MG/DL (ref 0.1–1)
BUN SERPL-MCNC: 13 MG/DL (ref 8–23)
CALCIUM SERPL-MCNC: 9.7 MG/DL (ref 8.7–10.5)
CHLORIDE SERPL-SCNC: 107 MMOL/L (ref 95–110)
CHOLEST SERPL-MCNC: 170 MG/DL (ref 120–199)
CHOLEST/HDLC SERPL: 2.8 {RATIO} (ref 2–5)
CO2 SERPL-SCNC: 27 MMOL/L (ref 23–29)
CREAT SERPL-MCNC: 1.4 MG/DL (ref 0.5–1.4)
DIFFERENTIAL METHOD: ABNORMAL
EOSINOPHIL # BLD AUTO: 0.1 K/UL (ref 0–0.5)
EOSINOPHIL NFR BLD: 2.1 % (ref 0–8)
ERYTHROCYTE [DISTWIDTH] IN BLOOD BY AUTOMATED COUNT: 14.2 % (ref 11.5–14.5)
EST. GFR  (AFRICAN AMERICAN): 51 ML/MIN/1.73 M^2
EST. GFR  (NON AFRICAN AMERICAN): 44 ML/MIN/1.73 M^2
GLUCOSE SERPL-MCNC: 95 MG/DL (ref 70–110)
HCT VFR BLD AUTO: 44.2 % (ref 40–54)
HDLC SERPL-MCNC: 61 MG/DL (ref 40–75)
HDLC SERPL: 35.9 % (ref 20–50)
HGB BLD-MCNC: 14.1 G/DL (ref 14–18)
IMM GRANULOCYTES # BLD AUTO: 0.01 K/UL (ref 0–0.04)
IMM GRANULOCYTES NFR BLD AUTO: 0.2 % (ref 0–0.5)
LDLC SERPL CALC-MCNC: 96.2 MG/DL (ref 63–159)
LYMPHOCYTES # BLD AUTO: 1 K/UL (ref 1–4.8)
LYMPHOCYTES NFR BLD: 16.6 % (ref 18–48)
MAGNESIUM SERPL-MCNC: 2.3 MG/DL (ref 1.6–2.6)
MCH RBC QN AUTO: 28.5 PG (ref 27–31)
MCHC RBC AUTO-ENTMCNC: 31.9 G/DL (ref 32–36)
MCV RBC AUTO: 90 FL (ref 82–98)
MONOCYTES # BLD AUTO: 0.5 K/UL (ref 0.3–1)
MONOCYTES NFR BLD: 7.8 % (ref 4–15)
NEUTROPHILS # BLD AUTO: 4.2 K/UL (ref 1.8–7.7)
NEUTROPHILS NFR BLD: 72.3 % (ref 38–73)
NONHDLC SERPL-MCNC: 109 MG/DL
NRBC BLD-RTO: 0 /100 WBC
PLATELET # BLD AUTO: 133 K/UL (ref 150–350)
PMV BLD AUTO: 12.1 FL (ref 9.2–12.9)
POTASSIUM SERPL-SCNC: 3.9 MMOL/L (ref 3.5–5.1)
PREALB SERPL-MCNC: 19 MG/DL (ref 20–43)
PROT SERPL-MCNC: 8 G/DL (ref 6–8.4)
RBC # BLD AUTO: 4.94 M/UL (ref 4.6–6.2)
SODIUM SERPL-SCNC: 142 MMOL/L (ref 136–145)
TRIGL SERPL-MCNC: 64 MG/DL (ref 30–150)
TSH SERPL DL<=0.005 MIU/L-ACNC: 0.52 UIU/ML (ref 0.4–4)
VIT B12 SERPL-MCNC: 271 PG/ML (ref 210–950)
WBC # BLD AUTO: 5.79 K/UL (ref 3.9–12.7)

## 2020-07-27 PROCEDURE — 85025 COMPLETE CBC W/AUTO DIFF WBC: CPT

## 2020-07-27 PROCEDURE — 80061 LIPID PANEL: CPT

## 2020-07-27 PROCEDURE — 82607 VITAMIN B-12: CPT

## 2020-07-27 PROCEDURE — 84443 ASSAY THYROID STIM HORMONE: CPT

## 2020-07-27 PROCEDURE — 83735 ASSAY OF MAGNESIUM: CPT

## 2020-07-27 PROCEDURE — 80053 COMPREHEN METABOLIC PANEL: CPT

## 2020-07-27 PROCEDURE — 84134 ASSAY OF PREALBUMIN: CPT

## 2020-07-27 PROCEDURE — 99999 PR PBB SHADOW E&M-EST. PATIENT-LVL III: ICD-10-PCS | Mod: PBBFAC,,, | Performed by: FAMILY MEDICINE

## 2020-07-27 PROCEDURE — 99214 PR OFFICE/OUTPT VISIT, EST, LEVL IV, 30-39 MIN: ICD-10-PCS | Mod: S$PBB,,, | Performed by: FAMILY MEDICINE

## 2020-07-27 PROCEDURE — 99213 OFFICE O/P EST LOW 20 MIN: CPT | Mod: PBBFAC,PO | Performed by: FAMILY MEDICINE

## 2020-07-27 PROCEDURE — 99999 PR PBB SHADOW E&M-EST. PATIENT-LVL III: CPT | Mod: PBBFAC,,, | Performed by: FAMILY MEDICINE

## 2020-07-27 PROCEDURE — 82306 VITAMIN D 25 HYDROXY: CPT

## 2020-07-27 PROCEDURE — 99214 OFFICE O/P EST MOD 30 MIN: CPT | Mod: S$PBB,,, | Performed by: FAMILY MEDICINE

## 2020-07-27 PROCEDURE — 36415 COLL VENOUS BLD VENIPUNCTURE: CPT

## 2020-07-27 RX ORDER — OMEPRAZOLE 40 MG/1
40 CAPSULE, DELAYED RELEASE ORAL DAILY
Qty: 90 CAPSULE | Refills: 3 | Status: ON HOLD | OUTPATIENT
Start: 2020-07-27 | End: 2020-10-22

## 2020-07-27 RX ORDER — QUETIAPINE FUMARATE 25 MG/1
25 TABLET, FILM COATED ORAL NIGHTLY
Qty: 30 TABLET | Refills: 11 | Status: ON HOLD | OUTPATIENT
Start: 2020-07-27 | End: 2020-10-22

## 2020-07-27 RX ORDER — DONEPEZIL HYDROCHLORIDE 5 MG/1
5 TABLET, FILM COATED ORAL NIGHTLY
Qty: 90 TABLET | Refills: 3 | Status: ON HOLD | OUTPATIENT
Start: 2020-07-27 | End: 2020-10-22

## 2020-07-27 RX ORDER — QUETIAPINE FUMARATE 25 MG/1
TABLET, FILM COATED ORAL
Qty: 60 TABLET | Refills: 11 | Status: SHIPPED | OUTPATIENT
Start: 2020-07-27 | End: 2020-07-27 | Stop reason: ALTCHOICE
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
## 2020-07-31 ENCOUNTER — TELEPHONE (OUTPATIENT)
Dept: FAMILY MEDICINE | Facility: CLINIC | Age: 85
End: 2020-07-31

## 2020-07-31 DIAGNOSIS — E53.8 LOW SERUM VITAMIN B12: Primary | ICD-10-CM

## 2020-07-31 RX ORDER — ASCORBIC ACID
1 CRYSTALS ORAL DAILY
Qty: 100 LOZENGE | Refills: 4 | Status: SHIPPED | OUTPATIENT
Start: 2020-07-31

## 2020-08-04 ENCOUNTER — HOSPITAL ENCOUNTER (EMERGENCY)
Facility: HOSPITAL | Age: 85
Discharge: HOME | End: 2020-08-04
Attending: EMERGENCY MEDICINE
Payer: MEDICARE

## 2020-08-04 VITALS
SYSTOLIC BLOOD PRESSURE: 138 MMHG | WEIGHT: 136.63 LBS | TEMPERATURE: 99 F | OXYGEN SATURATION: 97 % | DIASTOLIC BLOOD PRESSURE: 62 MMHG | HEIGHT: 69 IN | RESPIRATION RATE: 14 BRPM | HEART RATE: 49 BPM | BODY MASS INDEX: 20.24 KG/M2

## 2020-08-04 DIAGNOSIS — I47.10 SVT (SUPRAVENTRICULAR TACHYCARDIA): ICD-10-CM

## 2020-08-04 DIAGNOSIS — F03.91 DEMENTIA WITH BEHAVIORAL DISTURBANCE, UNSPECIFIED DEMENTIA TYPE: Primary | ICD-10-CM

## 2020-08-04 DIAGNOSIS — K21.9 GASTROESOPHAGEAL REFLUX DISEASE WITHOUT ESOPHAGITIS: ICD-10-CM

## 2020-08-04 DIAGNOSIS — I10 BENIGN ESSENTIAL HYPERTENSION: ICD-10-CM

## 2020-08-04 DIAGNOSIS — F02.818 LATE ONSET ALZHEIMER'S DISEASE WITH BEHAVIORAL DISTURBANCE: ICD-10-CM

## 2020-08-04 DIAGNOSIS — G30.1 LATE ONSET ALZHEIMER'S DISEASE WITH BEHAVIORAL DISTURBANCE: ICD-10-CM

## 2020-08-04 DIAGNOSIS — R41.82 ALTERED MENTAL STATUS: ICD-10-CM

## 2020-08-04 LAB
ALBUMIN SERPL BCP-MCNC: 3.8 G/DL (ref 3.5–5.2)
ALP SERPL-CCNC: 62 U/L (ref 38–126)
ALT SERPL W/O P-5'-P-CCNC: 10 U/L (ref 10–44)
AMPHET+METHAMPHET UR QL: NEGATIVE
ANION GAP SERPL CALC-SCNC: 5 MMOL/L (ref 8–16)
AST SERPL-CCNC: 21 U/L (ref 15–46)
BARBITURATES UR QL SCN>200 NG/ML: NEGATIVE
BASOPHILS # BLD AUTO: 0.03 K/UL (ref 0–0.2)
BASOPHILS NFR BLD: 0.6 % (ref 0–1.9)
BENZODIAZ UR QL SCN>200 NG/ML: NEGATIVE
BILIRUB SERPL-MCNC: 0.5 MG/DL (ref 0.1–1)
BILIRUB UR QL STRIP: NEGATIVE
BUN SERPL-MCNC: 13 MG/DL (ref 2–20)
BZE UR QL SCN: NEGATIVE
CALCIUM SERPL-MCNC: 9.3 MG/DL (ref 8.7–10.5)
CANNABINOIDS UR QL SCN: NEGATIVE
CHLORIDE SERPL-SCNC: 108 MMOL/L (ref 95–110)
CLARITY UR REFRACT.AUTO: CLEAR
CO2 SERPL-SCNC: 29 MMOL/L (ref 23–29)
COLOR UR AUTO: YELLOW
CREAT SERPL-MCNC: 1.2 MG/DL (ref 0.5–1.4)
CREAT UR-MCNC: 132.9 MG/DL (ref 23–375)
DIFFERENTIAL METHOD: ABNORMAL
EOSINOPHIL # BLD AUTO: 0.1 K/UL (ref 0–0.5)
EOSINOPHIL NFR BLD: 1.7 % (ref 0–8)
ERYTHROCYTE [DISTWIDTH] IN BLOOD BY AUTOMATED COUNT: 13.8 % (ref 11.5–14.5)
EST. GFR  (AFRICAN AMERICAN): >60 ML/MIN/1.73 M^2
EST. GFR  (NON AFRICAN AMERICAN): 52.9 ML/MIN/1.73 M^2
ETHANOL SERPL-MCNC: <10 MG/DL
GLUCOSE SERPL-MCNC: 119 MG/DL (ref 70–110)
GLUCOSE UR QL STRIP: NEGATIVE
HCT VFR BLD AUTO: 39.7 % (ref 40–54)
HGB BLD-MCNC: 12.6 G/DL (ref 14–18)
HGB UR QL STRIP: NEGATIVE
IMM GRANULOCYTES # BLD AUTO: 0.02 K/UL (ref 0–0.04)
IMM GRANULOCYTES NFR BLD AUTO: 0.4 % (ref 0–0.5)
KETONES UR QL STRIP: ABNORMAL
LEUKOCYTE ESTERASE UR QL STRIP: NEGATIVE
LYMPHOCYTES # BLD AUTO: 0.7 K/UL (ref 1–4.8)
LYMPHOCYTES NFR BLD: 12.2 % (ref 18–48)
MCH RBC QN AUTO: 28.3 PG (ref 27–31)
MCHC RBC AUTO-ENTMCNC: 31.7 G/DL (ref 32–36)
MCV RBC AUTO: 89 FL (ref 82–98)
METHADONE UR QL SCN>300 NG/ML: NEGATIVE
MONOCYTES # BLD AUTO: 0.4 K/UL (ref 0.3–1)
MONOCYTES NFR BLD: 7.8 % (ref 4–15)
NEUTROPHILS # BLD AUTO: 4.2 K/UL (ref 1.8–7.7)
NEUTROPHILS NFR BLD: 77.3 % (ref 38–73)
NITRITE UR QL STRIP: NEGATIVE
NRBC BLD-RTO: 0 /100 WBC
OPIATES UR QL SCN: NEGATIVE
PCP UR QL SCN>25 NG/ML: NEGATIVE
PH UR STRIP: 6 [PH] (ref 5–8)
PLATELET # BLD AUTO: 123 K/UL (ref 150–350)
PMV BLD AUTO: 10.8 FL (ref 9.2–12.9)
POTASSIUM SERPL-SCNC: 4 MMOL/L (ref 3.5–5.1)
PROT SERPL-MCNC: 7.4 G/DL (ref 6–8.4)
PROT UR QL STRIP: NEGATIVE
RBC # BLD AUTO: 4.45 M/UL (ref 4.6–6.2)
SODIUM SERPL-SCNC: 142 MMOL/L (ref 136–145)
SP GR UR STRIP: 1.01 (ref 1–1.03)
TOXICOLOGY INFORMATION: NORMAL
URN SPEC COLLECT METH UR: ABNORMAL
UROBILINOGEN UR STRIP-ACNC: NEGATIVE EU/DL
WBC # BLD AUTO: 5.41 K/UL (ref 3.9–12.7)

## 2020-08-04 PROCEDURE — 93010 ELECTROCARDIOGRAM REPORT: CPT | Mod: ,,, | Performed by: INTERNAL MEDICINE

## 2020-08-04 PROCEDURE — 93010 EKG 12-LEAD: ICD-10-PCS | Mod: ,,, | Performed by: INTERNAL MEDICINE

## 2020-08-04 PROCEDURE — 85025 COMPLETE CBC W/AUTO DIFF WBC: CPT | Mod: ER

## 2020-08-04 PROCEDURE — 80053 COMPREHEN METABOLIC PANEL: CPT | Mod: ER

## 2020-08-04 PROCEDURE — 93005 ELECTROCARDIOGRAM TRACING: CPT | Mod: ER

## 2020-08-04 PROCEDURE — 80320 DRUG SCREEN QUANTALCOHOLS: CPT | Mod: ER

## 2020-08-04 PROCEDURE — 80307 DRUG TEST PRSMV CHEM ANLYZR: CPT | Mod: ER

## 2020-08-04 PROCEDURE — 81003 URINALYSIS AUTO W/O SCOPE: CPT | Mod: ER,59

## 2020-08-04 PROCEDURE — 99284 EMERGENCY DEPT VISIT MOD MDM: CPT | Mod: 25,ER
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
## 2020-08-04 NOTE — DISCHARGE INSTRUCTIONS
Follow-up with his PCP tomorrow.  Home health orders have been placed and they will be contacting you.  Return to the emergency department if worse in any way.
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
## 2020-08-05 ENCOUNTER — TELEPHONE (OUTPATIENT)
Dept: FAMILY MEDICINE | Facility: CLINIC | Age: 85
End: 2020-08-05
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
## 2020-08-05 NOTE — ED PROVIDER NOTES
"Encounter Date: 8/4/2020       History     Chief Complaint   Patient presents with    Altered Mental Status     Pt to ED per EMS, states pt family c/o pt "wandering".  Report that pt family states he has dementia and will "wander" and they are "not able to control him."  Pt alert on arrival, pleasant on arrival.  Pt states "my daughter said I can't come there no more, they want my money."  Pt reports to live with daughter.      Patient is a 89-year-old male brought to the emergency department by EMS after being found wandering by the police.  The daughter was driving home and saw him sitting on the ground by the police and told them she cannot handle him anymore.  His daughter states that he takes money out of the bank or takes money from the house and today had $4000 cash in his pocket which she grabbed from his pocket in front of police and EMS before he was brought to the hospital. She reports that he wanders and she has been having difficulty keeping him at home.  They also apparently argue frequently about his money.  He states he thinks she is trying to take his pension.  I did speak with the daughter and the patient's wife.  They both stated that he frequently wanders but that they do not feel like he is a threat to them or threat to himself.  They have expressed wanting to get him into a nursing home and have started the process but then decided to hold off that now have decided that that is the best option.  He will go home tonight and I will order home health.  The , Jazmine also spoke with the daughter and explained the options to her.        Review of patient's allergies indicates:   Allergen Reactions    Naproxen      Other reaction(s): bleeding    Naproxen sodium Other (See Comments)     Other reaction(s): stomch bleeding    Ticlopidine Other (See Comments)     Other reaction(s): extreme bleeding     Past Medical History:   Diagnosis Date    Benign essential hypertension     " Borderline glaucoma, open angle with borderline findings 10/19/2012    Constipation 5/23/2014    Erectile dysfunction     GERD (gastroesophageal reflux disease)     History of GI bleed 12/12/2016    Hyperlipidemia     Iron deficiency anemia 12/13/2016    Nuclear sclerosis 10/19/2012     Past Surgical History:   Procedure Laterality Date    CATARACT EXTRACTION W/  INTRAOCULAR LENS IMPLANT  12/5/12    OD w/     COLONOSCOPY N/A 10/31/2017    Procedure: COLONOSCOPY;  Surgeon: Oswaldo Zee MD;  [facility];  Service: Endoscopy;  Laterality: N/A;    lump in back removed  2009    lump removal      head    PARS PLANA VITRECTOMY  12/11/12    OD w/ dr. tavarez    RETINAL DETACHMENT SURGERY  3/26/13    Right eye    TONSILLECTOMY       Family History   Problem Relation Age of Onset    Cancer Mother     Diabetes Mother     Hypertension Father     Stroke Father     Cancer Sister     Cancer Brother     Cataracts Paternal Grandmother     Glaucoma Paternal Grandmother     Hypertension Paternal Grandmother     Amblyopia Neg Hx     Blindness Neg Hx     Macular degeneration Neg Hx     Retinal detachment Neg Hx     Strabismus Neg Hx     Thyroid disease Neg Hx      Social History     Tobacco Use    Smoking status: Former Smoker    Smokeless tobacco: Never Used   Substance Use Topics    Alcohol use: Yes     Alcohol/week: 0.8 standard drinks     Types: 1 Standard drinks or equivalent per week     Comment: soically    Drug use: No     Review of Systems   Constitutional: Negative for appetite change, chills, fatigue and fever.   HENT: Negative for congestion, ear pain, rhinorrhea, sinus pressure and sore throat.    Respiratory: Negative for cough, shortness of breath and wheezing.    Cardiovascular: Negative for chest pain and palpitations.   Gastrointestinal: Negative for abdominal pain, blood in stool, constipation, diarrhea, nausea and vomiting.   Genitourinary: Negative for dysuria,  frequency and hematuria.   Musculoskeletal: Negative for back pain, neck pain and neck stiffness.   Skin: Negative for rash.   Neurological: Negative for dizziness, weakness, numbness and headaches.   All other systems reviewed and are negative.      Physical Exam     Initial Vitals [08/04/20 1541]   BP Pulse Resp Temp SpO2   127/74 69 16 98.6 °F (37 °C) 99 %      MAP       --         Physical Exam    Nursing note and vitals reviewed.  Constitutional: He appears well-developed and well-nourished. He appears distressed.   HENT:   Head: Normocephalic and atraumatic.   Nose: Nose normal.   Mouth/Throat: Oropharynx is clear and moist.   Eyes: Conjunctivae and EOM are normal. Pupils are equal, round, and reactive to light.   Neck: Normal range of motion. Neck supple.   Cardiovascular: Normal rate, regular rhythm, normal heart sounds and intact distal pulses.   Pulmonary/Chest: Breath sounds normal. No respiratory distress.   Abdominal: Soft. Bowel sounds are normal. There is no abdominal tenderness.   Lymphadenopathy:     He has no cervical adenopathy.   Neurological: He is alert and oriented to person, place, and time. He has normal strength. No sensory deficit.   Skin: Skin is warm and dry. No rash noted.   Psychiatric: He has a normal mood and affect. His behavior is normal. Judgment and thought content normal.   Patient states he does not like living with his daughter any longer and he feels that she is trying to take his pension.  He does plan to find somewhere else to live but feels safe going home tonight.         ED Course   Procedures  Labs Reviewed   CBC W/ AUTO DIFFERENTIAL - Abnormal; Notable for the following components:       Result Value    RBC 4.45 (*)     Hemoglobin 12.6 (*)     Hematocrit 39.7 (*)     Mean Corpuscular Hemoglobin Conc 31.7 (*)     Platelets 123 (*)     Lymph # 0.7 (*)     Gran% 77.3 (*)     Lymph% 12.2 (*)     All other components within normal limits   COMPREHENSIVE METABOLIC PANEL -  Abnormal; Notable for the following components:    Glucose 119 (*)     Anion Gap 5 (*)     eGFR if non  52.9 (*)     All other components within normal limits   URINALYSIS, REFLEX TO URINE CULTURE - Abnormal; Notable for the following components:    Ketones, UA 1+ (*)     All other components within normal limits    Narrative:     Specimen Source->Urine   ALCOHOL,MEDICAL (ETHANOL)   DRUG SCREEN PANEL, URINE EMERGENCY    Narrative:     Specimen Source->Urine          Imaging Results          CT Head Without Contrast (Final result)  Result time 08/04/20 17:00:48    Final result by Petros Low III, MD (08/04/20 17:00:48)                 Impression:      1.  Atrophy.  Bilateral white matter changes with scattered lacuna, most likely related to microvascular disease.  No bleed or other gross acute intracranial event suggested.    All CT scans at this facility are performed  using dose modulation techniques as appropriate to performed exam including the following:  automated exposure control; adjustment of mA and/or kV according to the patients size (this includes techniques or standardized protocols for targeted exams where dose is matched to indication/reason for exam: i.e. extremities or head);  iterative reconstruction technique.      Electronically signed by: Petros Low MD  Date:    08/04/2020  Time:    17:00             Narrative:    EXAMINATION:  CT HEAD WITHOUT CONTRAST    CLINICAL HISTORY:  Altered mental status;    TECHNIQUE:  Standard non contrast CT scan of the brain.    COMPARISON:  Two thousand ten    FINDINGS:  There are again noted generalized changes of atrophy, both supra and infratentorial.  Moderate changes of low attenuation are again noted in the periventricular and subcortical white matter.  Right moderately extensive lacuna again noted.  There is no hemorrhage, mass lesion, hydrocephalus, or midline shift.  No acute/depressed skull fracture.                                  Medical Decision Making:   Clinical Tests:   Lab Tests: Ordered and Reviewed  The following lab test(s) were unremarkable: CBC, CMP and Urinalysis  Radiological Study: Ordered and Reviewed  Medical Tests: Ordered and Reviewed  Elderly protective Services were contacted but we are awaiting a call back.  Patient states he does feel comfortable going home tonight but expressed interest in finding somewhere else to stay.  I did order ambulatory home health services until they can find him placement in a facility.  He is oriented in the ED and appears to be showing good judgment here however his daughter and wife state that he is usually able to keep his behavior in line while seeing providers but at home acts out.                                  Clinical Impression:       ICD-10-CM ICD-9-CM   1. Dementia with behavioral disturbance, unspecified dementia type  F03.91 294.21   2. Altered mental status  R41.82 780.97   3. Late onset Alzheimer's disease with behavioral disturbance  G30.1 331.0    F02.81 294.11   4. SVT (supraventricular tachycardia)  I47.1 427.89   5. Benign essential hypertension  I10 401.1   6. Gastroesophageal reflux disease without esophagitis  K21.9 530.81         Disposition:   Disposition: Discharged     ED Disposition Condition    Discharge Stable        ED Prescriptions     None        Follow-up Information     Follow up With Specialties Details Why Contact Info    Lindsey Du MD Family Medicine In 1 day  [address]  [address]  [facility] [address] [phone]

## 2020-08-05 NOTE — NURSING
CASE MANAGEMENT NOTE  Spoke to Esther with Elderly Protective Services and she will open a case on this patient. Orders for home health noted and will try to get someone to service this patient.

## 2020-08-05 NOTE — TELEPHONE ENCOUNTER
----- Message from Lito Sosa sent at 8/5/2020  8:28 AM CDT -----  Contact: Ofelia, daughter / [phone]  Patient's daughter would like a call back from your office regarding the patient having a mental disturbance last night. Please Advise.

## 2020-08-06 ENCOUNTER — OUTPATIENT CASE MANAGEMENT (OUTPATIENT)
Dept: ADMINISTRATIVE | Facility: OTHER | Age: 85
End: 2020-08-06

## 2020-08-06 NOTE — PROGRESS NOTES
"[facility]CM received referral for pt via email.  OPCM RN placed call to pt/cg  to introduce, offer, and enroll pt into [facility] program. OPCM contacted Cg Kerri Green explained program and its benefits. Cg declined participation, expressed frustration to being offered help but "no can actually help, unless you will come stay with him I do not need your help. Dgt was offered [facility]PCM contact information should support be needed but Ms. Green refused. Closing case.     "

## 2020-08-10 PROCEDURE — G0180 MD CERTIFICATION HHA PATIENT: HCPCS | Mod: ,,, | Performed by: FAMILY MEDICINE

## 2020-08-10 PROCEDURE — G0180 PR HOME HEALTH MD CERTIFICATION: ICD-10-PCS | Mod: ,,, | Performed by: FAMILY MEDICINE

## 2020-08-12 ENCOUNTER — TELEPHONE (OUTPATIENT)
Dept: FAMILY MEDICINE | Facility: CLINIC | Age: 85
End: 2020-08-12

## 2020-08-12 NOTE — TELEPHONE ENCOUNTER
----- Message from Mona Muniz sent at 8/12/2020 12:14 PM CDT -----  Contact: [phone]  Gabe juarez/ [facility] is calling in regards to Toby Green needing to speak with the Office.  Gabe stated that the pt is not Home Health appropriate and the pt was discharged from the Home Health     Please call and advise

## 2020-08-24 ENCOUNTER — TELEPHONE (OUTPATIENT)
Dept: FAMILY MEDICINE | Facility: CLINIC | Age: 85
End: 2020-08-24

## 2020-08-24 DIAGNOSIS — Z11.1 TUBERCULOSIS SCREENING: Primary | ICD-10-CM

## 2020-08-24 NOTE — TELEPHONE ENCOUNTER
Please contact to schedule quatiferon gold TB screening--- non-fasting and requird TB screening for paperwork provided, thanks, LFM

## 2020-08-27 ENCOUNTER — EXTERNAL HOME HEALTH (OUTPATIENT)
Dept: HOME HEALTH SERVICES | Facility: HOSPITAL | Age: 85
End: 2020-08-27

## 2020-08-28 ENCOUNTER — TELEPHONE (OUTPATIENT)
Dept: INTERNAL MEDICINE | Facility: CLINIC | Age: 85
End: 2020-08-28

## 2020-08-28 NOTE — TELEPHONE ENCOUNTER
----- Message from Mary Gomes RN sent at 8/28/2020  2:26 PM CDT -----  Good afternoon,    FYI    I accidentally created an External Hospital Admission yesterday for this patient ([facility] [id number]).  Epic has made the encounter erroneous, but I wanted to let you know to prevent any kind of confusion.    Have a great weekend!    Respectfully,    Mary MOSER, RN  [facility]  [email or web address]

## 2020-09-01 ENCOUNTER — LAB VISIT (OUTPATIENT)
Dept: LAB | Facility: HOSPITAL | Age: 85
End: 2020-09-01
Attending: FAMILY MEDICINE
Payer: MEDICARE

## 2020-09-01 DIAGNOSIS — Z11.1 TUBERCULOSIS SCREENING: ICD-10-CM

## 2020-09-01 PROCEDURE — 86480 TB TEST CELL IMMUN MEASURE: CPT | Mod: PO

## 2020-09-01 PROCEDURE — 36415 COLL VENOUS BLD VENIPUNCTURE: CPT | Mod: PO

## 2020-09-03 LAB
GAMMA INTERFERON BACKGROUND BLD IA-ACNC: 0.03 IU/ML
M TB IFN-G CD4+ BCKGRND COR BLD-ACNC: 7.1 IU/ML
MITOGEN IGNF BCKGRD COR BLD-ACNC: >10 IU/ML
TB GOLD PLUS: POSITIVE
TB2 - NIL: 7.02 IU/ML

## 2020-09-05 ENCOUNTER — PATIENT MESSAGE (OUTPATIENT)
Dept: FAMILY MEDICINE | Facility: CLINIC | Age: 85
End: 2020-09-05

## 2020-09-05 DIAGNOSIS — R76.12 POSITIVE QUANTIFERON-TB GOLD TEST: Primary | ICD-10-CM

## 2020-09-05 NOTE — TELEPHONE ENCOUNTER
Covering for Dr. Du.  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  As he having any symptoms of fevers or chills or sweats or chronic cough?.  The next step would be to get a chest x-ray to check for any abnormalities.  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  I believe this test was ordered because of nursing home placement issues, not sure with their protocol is on a patient with a positive QuantiFERON if they demand that he be treated with isoniazid as this may cause more harm than benefit.  For now will get the chest x-ray ordered to check for any active disease.

## 2020-09-08 NOTE — TELEPHONE ENCOUNTER
Spoke with pt. Daughter to schedule chest x-ray . Patient daughter was willing to do chest x-ray but did ask about nursing home paperwork . 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 I informed her I will contact the covering provider .

## 2020-09-14 ENCOUNTER — EXTERNAL HOME HEALTH (OUTPATIENT)
Dept: HOME HEALTH SERVICES | Facility: HOSPITAL | Age: 85
End: 2020-09-14

## 2020-09-16 ENCOUNTER — HOSPITAL ENCOUNTER (EMERGENCY)
Facility: HOSPITAL | Age: 85
Discharge: PSYCHIATRIC OR SUBSTANCE USE FACILITY | End: 2020-09-16
Attending: EMERGENCY MEDICINE
Payer: MEDICARE

## 2020-09-16 ENCOUNTER — TELEPHONE (OUTPATIENT)
Dept: FAMILY MEDICINE | Facility: CLINIC | Age: 85
End: 2020-09-16

## 2020-09-16 VITALS
TEMPERATURE: 98 F | RESPIRATION RATE: 18 BRPM | OXYGEN SATURATION: 99 % | SYSTOLIC BLOOD PRESSURE: 167 MMHG | DIASTOLIC BLOOD PRESSURE: 84 MMHG | HEART RATE: 64 BPM

## 2020-09-16 DIAGNOSIS — F03.91 DEMENTIA WITH BEHAVIORAL DISTURBANCE, UNSPECIFIED DEMENTIA TYPE: Primary | ICD-10-CM

## 2020-09-16 DIAGNOSIS — Z00.8 MEDICAL CLEARANCE FOR PSYCHIATRIC ADMISSION: ICD-10-CM

## 2020-09-16 LAB
ALBUMIN SERPL BCP-MCNC: 3.5 G/DL (ref 3.5–5.2)
ALP SERPL-CCNC: 71 U/L (ref 55–135)
ALT SERPL W/O P-5'-P-CCNC: 7 U/L (ref 10–44)
AMPHET+METHAMPHET UR QL: NEGATIVE
ANION GAP SERPL CALC-SCNC: 8 MMOL/L (ref 8–16)
APAP SERPL-MCNC: <3 UG/ML (ref 10–20)
AST SERPL-CCNC: 15 U/L (ref 10–40)
BARBITURATES UR QL SCN>200 NG/ML: NEGATIVE
BASOPHILS # BLD AUTO: 0.04 K/UL (ref 0–0.2)
BASOPHILS NFR BLD: 0.8 % (ref 0–1.9)
BENZODIAZ UR QL SCN>200 NG/ML: NEGATIVE
BILIRUB SERPL-MCNC: 0.5 MG/DL (ref 0.1–1)
BILIRUB UR QL STRIP: NEGATIVE
BUN SERPL-MCNC: 15 MG/DL (ref 8–23)
BZE UR QL SCN: NEGATIVE
CALCIUM SERPL-MCNC: 8.7 MG/DL (ref 8.7–10.5)
CANNABINOIDS UR QL SCN: NEGATIVE
CHLORIDE SERPL-SCNC: 106 MMOL/L (ref 95–110)
CLARITY UR: CLEAR
CO2 SERPL-SCNC: 26 MMOL/L (ref 23–29)
COLOR UR: YELLOW
CREAT SERPL-MCNC: 1.1 MG/DL (ref 0.5–1.4)
CREAT UR-MCNC: 131 MG/DL (ref 23–375)
DIFFERENTIAL METHOD: ABNORMAL
EOSINOPHIL # BLD AUTO: 0.1 K/UL (ref 0–0.5)
EOSINOPHIL NFR BLD: 2.3 % (ref 0–8)
ERYTHROCYTE [DISTWIDTH] IN BLOOD BY AUTOMATED COUNT: 13.4 % (ref 11.5–14.5)
EST. GFR  (AFRICAN AMERICAN): >60 ML/MIN/1.73 M^2
EST. GFR  (NON AFRICAN AMERICAN): 59 ML/MIN/1.73 M^2
ETHANOL SERPL-MCNC: <10 MG/DL
GLUCOSE SERPL-MCNC: 157 MG/DL (ref 70–110)
GLUCOSE UR QL STRIP: NEGATIVE
HCT VFR BLD AUTO: 41.3 % (ref 40–54)
HGB BLD-MCNC: 13.1 G/DL (ref 14–18)
HGB UR QL STRIP: NEGATIVE
IMM GRANULOCYTES # BLD AUTO: 0.02 K/UL (ref 0–0.04)
IMM GRANULOCYTES NFR BLD AUTO: 0.4 % (ref 0–0.5)
KETONES UR QL STRIP: NEGATIVE
LEUKOCYTE ESTERASE UR QL STRIP: NEGATIVE
LYMPHOCYTES # BLD AUTO: 0.6 K/UL (ref 1–4.8)
LYMPHOCYTES NFR BLD: 12.6 % (ref 18–48)
MCH RBC QN AUTO: 27.8 PG (ref 27–31)
MCHC RBC AUTO-ENTMCNC: 31.7 G/DL (ref 32–36)
MCV RBC AUTO: 88 FL (ref 82–98)
METHADONE UR QL SCN>300 NG/ML: NEGATIVE
MONOCYTES # BLD AUTO: 0.4 K/UL (ref 0.3–1)
MONOCYTES NFR BLD: 8.1 % (ref 4–15)
NEUTROPHILS # BLD AUTO: 3.7 K/UL (ref 1.8–7.7)
NEUTROPHILS NFR BLD: 75.8 % (ref 38–73)
NITRITE UR QL STRIP: NEGATIVE
NRBC BLD-RTO: 0 /100 WBC
OPIATES UR QL SCN: NEGATIVE
PCP UR QL SCN>25 NG/ML: NEGATIVE
PH UR STRIP: 7 [PH] (ref 5–8)
PLATELET # BLD AUTO: 120 K/UL (ref 150–350)
PMV BLD AUTO: 12.2 FL (ref 9.2–12.9)
POTASSIUM SERPL-SCNC: 3.9 MMOL/L (ref 3.5–5.1)
PROT SERPL-MCNC: 6.9 G/DL (ref 6–8.4)
PROT UR QL STRIP: NEGATIVE
RBC # BLD AUTO: 4.72 M/UL (ref 4.6–6.2)
SARS-COV-2 RDRP RESP QL NAA+PROBE: NEGATIVE
SODIUM SERPL-SCNC: 140 MMOL/L (ref 136–145)
SP GR UR STRIP: 1.02 (ref 1–1.03)
T4 FREE SERPL-MCNC: 0.96 NG/DL (ref 0.71–1.51)
TOXICOLOGY INFORMATION: NORMAL
TROPONIN I SERPL DL<=0.01 NG/ML-MCNC: <0.006 NG/ML (ref 0–0.03)
TSH SERPL DL<=0.005 MIU/L-ACNC: 0.39 UIU/ML (ref 0.4–4)
URN SPEC COLLECT METH UR: NORMAL
UROBILINOGEN UR STRIP-ACNC: NEGATIVE EU/DL
WBC # BLD AUTO: 4.83 K/UL (ref 3.9–12.7)

## 2020-09-16 PROCEDURE — 80053 COMPREHEN METABOLIC PANEL: CPT

## 2020-09-16 PROCEDURE — 80320 DRUG SCREEN QUANTALCOHOLS: CPT

## 2020-09-16 PROCEDURE — 93005 ELECTROCARDIOGRAM TRACING: CPT

## 2020-09-16 PROCEDURE — 84439 ASSAY OF FREE THYROXINE: CPT

## 2020-09-16 PROCEDURE — 85025 COMPLETE CBC W/AUTO DIFF WBC: CPT

## 2020-09-16 PROCEDURE — 84443 ASSAY THYROID STIM HORMONE: CPT

## 2020-09-16 PROCEDURE — 80307 DRUG TEST PRSMV CHEM ANLYZR: CPT

## 2020-09-16 PROCEDURE — 99285 EMERGENCY DEPT VISIT HI MDM: CPT | Mod: 25

## 2020-09-16 PROCEDURE — 80329 ANALGESICS NON-OPIOID 1 OR 2: CPT

## 2020-09-16 PROCEDURE — G0427 PR INPT TELEHEALTH CON 70/>M: ICD-10-PCS | Mod: 95,,, | Performed by: PSYCHIATRY & NEUROLOGY

## 2020-09-16 PROCEDURE — 81003 URINALYSIS AUTO W/O SCOPE: CPT | Mod: 59

## 2020-09-16 PROCEDURE — 84484 ASSAY OF TROPONIN QUANT: CPT

## 2020-09-16 PROCEDURE — G0427 INPT/ED TELECONSULT70: HCPCS | Mod: 95,,, | Performed by: PSYCHIATRY & NEUROLOGY

## 2020-09-16 PROCEDURE — U0002 COVID-19 LAB TEST NON-CDC: HCPCS
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
## 2020-09-16 NOTE — TELEPHONE ENCOUNTER
Spoke with pt. Daughter who has questions about  Her father's paperwork I informed her I will contact her once paperwork is filled out. She had to contact the police today to take her father to the hospital today due to his dementia. Patient daughter would like to place her father into a nursing home
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
## 2020-09-16 NOTE — TELEPHONE ENCOUNTER
----- Message from Ronnie Knox sent at 9/16/2020 10:48 AM CDT -----  Contact: patient daughter/ Kerri [phone]  Toby Green DAUGHTER calling to speak with you regarding the patient dementia getting out of control and the patient is walking off without permission.   She would like to speak with the nurse regarding nursing home orders .   Please advise
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
## 2020-09-16 NOTE — ED NOTES
Security at bedside to [facility] pt. Pt has $459, wallet, and life alert type electronic locked up with security. Pt clothing and shoes in belongings bag in closet.

## 2020-09-17 NOTE — ED NOTES
SPD arrived to transport pt. Pt valuables given to [employer] staff including wallet, electronic life alert device, bag of clothing, and suitcase from family. $459 cash was counted twice with security and provided to [employer] to pass on to staff at receiving facility. Pt has wedding band on his ring finger. Pt escorted to transport vehicle with security and ER staff.

## 2020-09-28 ENCOUNTER — TELEPHONE (OUTPATIENT)
Dept: FAMILY MEDICINE | Facility: CLINIC | Age: 85
End: 2020-09-28

## 2020-09-30 ENCOUNTER — TELEPHONE (OUTPATIENT)
Dept: FAMILY MEDICINE | Facility: CLINIC | Age: 85
End: 2020-09-30

## 2020-10-19 ENCOUNTER — EXTERNAL HOME HEALTH (OUTPATIENT)
Dept: HOME HEALTH SERVICES | Facility: HOSPITAL | Age: 85
End: 2020-10-19
Payer: MEDICARE

## 2020-10-21 ENCOUNTER — HOSPITAL ENCOUNTER (INPATIENT)
Facility: HOSPITAL | Age: 85
LOS: 5 days | DRG: 281 | End: 2020-10-26
Attending: EMERGENCY MEDICINE | Admitting: FAMILY MEDICINE
Payer: MEDICARE

## 2020-10-21 DIAGNOSIS — I21.4 NSTEMI (NON-ST ELEVATED MYOCARDIAL INFARCTION): Primary | ICD-10-CM

## 2020-10-21 DIAGNOSIS — I47.10 SVT (SUPRAVENTRICULAR TACHYCARDIA): ICD-10-CM

## 2020-10-21 DIAGNOSIS — R07.9 CHEST PAIN: ICD-10-CM

## 2020-10-21 PROBLEM — N17.9 AKI (ACUTE KIDNEY INJURY): Status: ACTIVE | Noted: 2020-10-21

## 2020-10-21 LAB
ALBUMIN SERPL BCP-MCNC: 3.2 G/DL (ref 3.5–5.2)
ALP SERPL-CCNC: 80 U/L (ref 55–135)
ALT SERPL W/O P-5'-P-CCNC: 20 U/L (ref 10–44)
ANION GAP SERPL CALC-SCNC: 8 MMOL/L (ref 8–16)
AORTIC ROOT ANNULUS: 3.91 CM
APTT BLDCRRT: 29 SEC (ref 21–32)
APTT BLDCRRT: 53.1 SEC (ref 21–32)
AST SERPL-CCNC: 24 U/L (ref 10–40)
AV INDEX (PROSTH): 0.53
AV MEAN GRADIENT: 13 MMHG
AV PEAK GRADIENT: 19 MMHG
AV VALVE AREA: 1.78 CM2
AV VELOCITY RATIO: 0.5
BASOPHILS # BLD AUTO: 0.05 K/UL (ref 0–0.2)
BASOPHILS NFR BLD: 0.7 % (ref 0–1.9)
BILIRUB SERPL-MCNC: 0.7 MG/DL (ref 0.1–1)
BSA FOR ECHO PROCEDURE: 1.8 M2
BUN SERPL-MCNC: 31 MG/DL (ref 8–23)
CALCIUM SERPL-MCNC: 8.6 MG/DL (ref 8.7–10.5)
CHLORIDE SERPL-SCNC: 106 MMOL/L (ref 95–110)
CO2 SERPL-SCNC: 24 MMOL/L (ref 23–29)
CREAT SERPL-MCNC: 1.6 MG/DL (ref 0.5–1.4)
CV ECHO LV RWT: 0.46 CM
DIFFERENTIAL METHOD: ABNORMAL
DOP CALC AO PEAK VEL: 2.16 M/S
DOP CALC AO VTI: 37.1 CM
DOP CALC LVOT AREA: 3.4 CM2
DOP CALC LVOT DIAMETER: 2.07 CM
DOP CALC LVOT PEAK VEL: 1.07 M/S
DOP CALC LVOT STROKE VOLUME: 66.16 CM3
DOP CALCLVOT PEAK VEL VTI: 19.67 CM
ECHO LV POSTERIOR WALL: 0.94 CM (ref 0.6–1.1)
EOSINOPHIL # BLD AUTO: 0.1 K/UL (ref 0–0.5)
EOSINOPHIL NFR BLD: 1 % (ref 0–8)
ERYTHROCYTE [DISTWIDTH] IN BLOOD BY AUTOMATED COUNT: 14.9 % (ref 11.5–14.5)
EST. GFR  (AFRICAN AMERICAN): 43 ML/MIN/1.73 M^2
EST. GFR  (NON AFRICAN AMERICAN): 37 ML/MIN/1.73 M^2
FRACTIONAL SHORTENING: 25 % (ref 28–44)
GLUCOSE SERPL-MCNC: 108 MG/DL (ref 70–110)
HCT VFR BLD AUTO: 42.5 % (ref 40–54)
HGB BLD-MCNC: 13.6 G/DL (ref 14–18)
IMM GRANULOCYTES # BLD AUTO: 0.03 K/UL (ref 0–0.04)
IMM GRANULOCYTES NFR BLD AUTO: 0.4 % (ref 0–0.5)
INR PPP: 1 (ref 0.8–1.2)
INTERVENTRICULAR SEPTUM: 0.99 CM (ref 0.6–1.1)
LA MAJOR: 4.74 CM
LA WIDTH: 3.61 CM
LEFT ATRIUM SIZE: 4.38 CM
LEFT INTERNAL DIMENSION IN SYSTOLE: 3.04 CM (ref 2.1–4)
LEFT VENTRICLE DIASTOLIC VOLUME INDEX: 39.58 ML/M2
LEFT VENTRICLE DIASTOLIC VOLUME: 72.68 ML
LEFT VENTRICLE MASS INDEX: 67 G/M2
LEFT VENTRICLE SYSTOLIC VOLUME INDEX: 19.7 ML/M2
LEFT VENTRICLE SYSTOLIC VOLUME: 36.17 ML
LEFT VENTRICULAR INTERNAL DIMENSION IN DIASTOLE: 4.06 CM (ref 3.5–6)
LEFT VENTRICULAR MASS: 123.73 G
LYMPHOCYTES # BLD AUTO: 1 K/UL (ref 1–4.8)
LYMPHOCYTES NFR BLD: 13.8 % (ref 18–48)
MCH RBC QN AUTO: 28.6 PG (ref 27–31)
MCHC RBC AUTO-ENTMCNC: 32 G/DL (ref 32–36)
MCV RBC AUTO: 89 FL (ref 82–98)
MONOCYTES # BLD AUTO: 0.7 K/UL (ref 0.3–1)
MONOCYTES NFR BLD: 10.2 % (ref 4–15)
NEUTROPHILS # BLD AUTO: 5.1 K/UL (ref 1.8–7.7)
NEUTROPHILS NFR BLD: 73.9 % (ref 38–73)
NRBC BLD-RTO: 0 /100 WBC
PISA TR MAX VEL: 2.18 M/S
PLATELET # BLD AUTO: 142 K/UL (ref 150–350)
PMV BLD AUTO: 12 FL (ref 9.2–12.9)
POTASSIUM SERPL-SCNC: 4.4 MMOL/L (ref 3.5–5.1)
PROT SERPL-MCNC: 7 G/DL (ref 6–8.4)
PROTHROMBIN TIME: 10.9 SEC (ref 9–12.5)
RA MAJOR: 4.71 CM
RA WIDTH: 3.28 CM
RBC # BLD AUTO: 4.76 M/UL (ref 4.6–6.2)
RIGHT VENTRICULAR END-DIASTOLIC DIMENSION: 2.78 CM
SARS-COV-2 RDRP RESP QL NAA+PROBE: NEGATIVE
SODIUM SERPL-SCNC: 138 MMOL/L (ref 136–145)
STJ: 3.75 CM
TDI LATERAL: 0.06 M/S
TDI SEPTAL: 0.06 M/S
TDI: 0.06 M/S
TR MAX PG: 19 MMHG
TRICUSPID ANNULAR PLANE SYSTOLIC EXCURSION: 1.75 CM
TROPONIN I SERPL DL<=0.01 NG/ML-MCNC: 1.44 NG/ML (ref 0–0.03)
TROPONIN I SERPL DL<=0.01 NG/ML-MCNC: 1.61 NG/ML (ref 0–0.03)
TROPONIN I SERPL DL<=0.01 NG/ML-MCNC: 1.64 NG/ML (ref 0–0.03)
TSH SERPL DL<=0.005 MIU/L-ACNC: 0.55 UIU/ML (ref 0.4–4)
WBC # BLD AUTO: 6.89 K/UL (ref 3.9–12.7)

## 2020-10-21 PROCEDURE — 93005 ELECTROCARDIOGRAM TRACING: CPT

## 2020-10-21 PROCEDURE — 99291 CRITICAL CARE FIRST HOUR: CPT

## 2020-10-21 PROCEDURE — 80053 COMPREHEN METABOLIC PANEL: CPT

## 2020-10-21 PROCEDURE — 85610 PROTHROMBIN TIME: CPT

## 2020-10-21 PROCEDURE — 93010 ELECTROCARDIOGRAM REPORT: CPT | Mod: ,,, | Performed by: INTERNAL MEDICINE

## 2020-10-21 PROCEDURE — 63600175 PHARM REV CODE 636 W HCPCS: Performed by: EMERGENCY MEDICINE

## 2020-10-21 PROCEDURE — 85730 THROMBOPLASTIN TIME PARTIAL: CPT | Mod: 91

## 2020-10-21 PROCEDURE — 84443 ASSAY THYROID STIM HORMONE: CPT

## 2020-10-21 PROCEDURE — 85025 COMPLETE CBC W/AUTO DIFF WBC: CPT

## 2020-10-21 PROCEDURE — 84484 ASSAY OF TROPONIN QUANT: CPT | Mod: 91

## 2020-10-21 PROCEDURE — 84484 ASSAY OF TROPONIN QUANT: CPT

## 2020-10-21 PROCEDURE — 11000001 HC ACUTE MED/SURG PRIVATE ROOM

## 2020-10-21 PROCEDURE — 36415 COLL VENOUS BLD VENIPUNCTURE: CPT

## 2020-10-21 PROCEDURE — 25000003 PHARM REV CODE 250: Performed by: FAMILY MEDICINE

## 2020-10-21 PROCEDURE — 93010 EKG 12-LEAD: ICD-10-PCS | Mod: ,,, | Performed by: INTERNAL MEDICINE

## 2020-10-21 PROCEDURE — U0002 COVID-19 LAB TEST NON-CDC: HCPCS

## 2020-10-21 PROCEDURE — 25000003 PHARM REV CODE 250: Performed by: EMERGENCY MEDICINE

## 2020-10-21 RX ORDER — FINASTERIDE 5 MG/1
5 TABLET, FILM COATED ORAL DAILY
Status: DISCONTINUED | OUTPATIENT
Start: 2020-10-22 | End: 2020-10-26 | Stop reason: HOSPADM

## 2020-10-21 RX ORDER — HEPARIN SODIUM,PORCINE/D5W 25000/250
12 INTRAVENOUS SOLUTION INTRAVENOUS CONTINUOUS
Status: DISCONTINUED | OUTPATIENT
Start: 2020-10-21 | End: 2020-10-22

## 2020-10-21 RX ORDER — PRAVASTATIN SODIUM 40 MG/1
80 TABLET ORAL DAILY
Status: DISCONTINUED | OUTPATIENT
Start: 2020-10-22 | End: 2020-10-26 | Stop reason: HOSPADM

## 2020-10-21 RX ORDER — DONEPEZIL HYDROCHLORIDE 5 MG/1
5 TABLET, FILM COATED ORAL NIGHTLY
Status: DISCONTINUED | OUTPATIENT
Start: 2020-10-21 | End: 2020-10-26 | Stop reason: HOSPADM

## 2020-10-21 RX ORDER — ASPIRIN 325 MG
325 TABLET ORAL
Status: COMPLETED | OUTPATIENT
Start: 2020-10-21 | End: 2020-10-21

## 2020-10-21 RX ORDER — SODIUM CHLORIDE 0.9 % (FLUSH) 0.9 %
10 SYRINGE (ML) INJECTION
Status: DISCONTINUED | OUTPATIENT
Start: 2020-10-21 | End: 2020-10-26 | Stop reason: HOSPADM

## 2020-10-21 RX ORDER — IBUPROFEN 200 MG
24 TABLET ORAL
Status: DISCONTINUED | OUTPATIENT
Start: 2020-10-21 | End: 2020-10-26 | Stop reason: HOSPADM

## 2020-10-21 RX ORDER — IBUPROFEN 200 MG
16 TABLET ORAL
Status: DISCONTINUED | OUTPATIENT
Start: 2020-10-21 | End: 2020-10-26 | Stop reason: HOSPADM

## 2020-10-21 RX ORDER — GLUCAGON 1 MG
1 KIT INJECTION
Status: DISCONTINUED | OUTPATIENT
Start: 2020-10-21 | End: 2020-10-26 | Stop reason: HOSPADM

## 2020-10-21 RX ORDER — DOCUSATE SODIUM 100 MG/1
100 CAPSULE, LIQUID FILLED ORAL 2 TIMES DAILY
Status: DISCONTINUED | OUTPATIENT
Start: 2020-10-21 | End: 2020-10-26 | Stop reason: HOSPADM

## 2020-10-21 RX ORDER — INSULIN ASPART 100 [IU]/ML
0-5 INJECTION, SOLUTION INTRAVENOUS; SUBCUTANEOUS
Status: DISCONTINUED | OUTPATIENT
Start: 2020-10-21 | End: 2020-10-26 | Stop reason: HOSPADM

## 2020-10-21 RX ORDER — TAMSULOSIN HYDROCHLORIDE 0.4 MG/1
1 CAPSULE ORAL NIGHTLY
Status: DISCONTINUED | OUTPATIENT
Start: 2020-10-21 | End: 2020-10-26 | Stop reason: HOSPADM

## 2020-10-21 RX ORDER — PANTOPRAZOLE SODIUM 40 MG/1
40 TABLET, DELAYED RELEASE ORAL DAILY
Status: DISCONTINUED | OUTPATIENT
Start: 2020-10-22 | End: 2020-10-26 | Stop reason: HOSPADM

## 2020-10-21 RX ORDER — QUETIAPINE FUMARATE 25 MG/1
25 TABLET, FILM COATED ORAL NIGHTLY
Status: DISCONTINUED | OUTPATIENT
Start: 2020-10-21 | End: 2020-10-26 | Stop reason: HOSPADM

## 2020-10-21 RX ADMIN — DOCUSATE SODIUM 100 MG: 100 CAPSULE, LIQUID FILLED ORAL at 08:10

## 2020-10-21 RX ADMIN — QUETIAPINE FUMARATE 25 MG: 25 TABLET ORAL at 08:10

## 2020-10-21 RX ADMIN — TAMSULOSIN HYDROCHLORIDE 0.4 MG: 0.4 CAPSULE ORAL at 08:10

## 2020-10-21 RX ADMIN — ASPIRIN 325 MG ORAL TABLET 325 MG: 325 PILL ORAL at 12:10

## 2020-10-21 RX ADMIN — DONEPEZIL HYDROCHLORIDE 5 MG: 5 TABLET, FILM COATED ORAL at 08:10

## 2020-10-21 RX ADMIN — HEPARIN SODIUM AND DEXTROSE 12 UNITS/KG/HR: 10000; 5 INJECTION INTRAVENOUS at 12:10

## 2020-10-21 NOTE — ASSESSMENT & PLAN NOTE
SVT (supraventricular tachycardia)  EKG  Consult [facility] cardiology  Heparin gtt  Loaded with ASA in ED  Troponin elevated trend

## 2020-10-21 NOTE — NURSING
Patient arrived to room in stretcher from ED. Patient alert and oriented to self only. Patient able to bridge from bed to stretcher. VSS. Tele monitor placed on patient reading NSR. Yellow fall risk band and nonskid socks in placed. Bed alarm on, bed locked and low, side rails up x2, and call bell in reach. Telesitter in place.

## 2020-10-21 NOTE — ED NOTES
Pt reports he is cold, provided 2 warm blankets. Pt sitting up in bed watching tv, call light in hand.

## 2020-10-21 NOTE — ED PROVIDER NOTES
Encounter Date: 10/21/2020    SCRIBE #1 NOTE: I, Ruperto Montero, am scribing for, and in the presence of, Harpreet Cramer MD.       History     Chief Complaint   Patient presents with    Tachycardia     Patient found to have increased heartrate by nursing home staff. Presents awake, alert, oriented with no c/o pain or discomfort. Skin w/d. Resp unlabored.      This is a 89 y.o. 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     The patient presents to the Emergency Department via EMS with tachycardia.   Patient was found to have elevated heart rate by NH staff prior to arrival.  Symptoms are associated with nothing.  States he did not feel anything.  Pt denies chest pain, SOB, palpitations, abd pain, N/V, headache or dizziness.  No aggravating or alleviating factors.     History limited secondary to patient dementia    The history is provided by the patient.     Review of patient's allergies indicates:   Allergen Reactions    Naproxen      Other reaction(s): bleeding    Naproxen sodium Other (See Comments)     Other reaction(s): stomch bleeding    Ticlopidine Other (See Comments)     Other reaction(s): extreme bleeding     Past Medical History:   Diagnosis Date    Benign essential hypertension     Borderline glaucoma, open angle with borderline findings 10/19/2012    Constipation 5/23/2014    Erectile dysfunction     GERD (gastroesophageal reflux disease)     History of GI bleed 12/12/2016    Hyperlipidemia     Iron deficiency anemia 12/13/2016    Nuclear sclerosis 10/19/2012     Past Surgical History:   Procedure Laterality Date    CATARACT EXTRACTION W/  INTRAOCULAR LENS IMPLANT  12/5/12    OD w/      COLONOSCOPY N/A 10/31/2017    Procedure: COLONOSCOPY;  Surgeon: Oswaldo Zee MD;  Location: [facility] ENDO;  Service: Endoscopy;  Laterality: N/A;    lump in back removed  2009    lump removal      head    PARS PLANA VITRECTOMY  12/11/12    OD w/ dr. tavarez    RETINAL DETACHMENT SURGERY  3/26/13    Right eye    TONSILLECTOMY       Family History   Problem Relation Age of Onset    Cancer Mother     Diabetes Mother     Hypertension Father     Stroke Father     Cancer Sister     Cancer Brother     Cataracts Paternal Grandmother     Glaucoma Paternal Grandmother     Hypertension Paternal Grandmother     Amblyopia Neg Hx     Blindness Neg Hx     Macular degeneration Neg Hx     Retinal detachment Neg Hx     Strabismus Neg Hx     Thyroid disease Neg Hx      Social History     Tobacco Use    Smoking status: Former Smoker    Smokeless tobacco: Never Used   Substance Use Topics    Alcohol use: Yes     Alcohol/week: 0.8 standard drinks     Types: 1 Standard drinks or equivalent per week     Comment: soically    Drug use: No     Review of Systems   Constitutional: Negative for fever.   HENT: Negative for sore throat.    Eyes: Negative for visual disturbance.   Respiratory: Negative for shortness of breath.    Cardiovascular: Negative for chest pain and palpitations.   Gastrointestinal: Negative for abdominal pain, nausea and vomiting.   Genitourinary: Negative for dysuria.   Musculoskeletal: Negative for back pain.   Skin: Negative for rash.   Neurological: Negative for dizziness, weakness and headaches.   All other systems reviewed and are negative.      Physical Exam     Initial Vitals [10/21/20 0942]   BP Pulse Resp Temp SpO2   104/63 (!) 164 20 98.7 °F (37.1 °C) 97 %      MAP       --         Physical Exam    Nursing note and vitals reviewed.  Constitutional: He appears well-developed and well-nourished.   HENT:   Head: Normocephalic and atraumatic.   Eyes: Conjunctivae and EOM are normal. Pupils  are equal, round, and reactive to light.   Neck: Normal range of motion. Neck supple.   Cardiovascular: Normal rate, regular rhythm and intact distal pulses.   Murmur heard.   Systolic murmur is present.  Occasional irregularity.  Systolic murmur at the left sternal border    Pulmonary/Chest: Breath sounds normal. No respiratory distress. He has no wheezes.   Abdominal: Soft. He exhibits no distension. There is no abdominal tenderness.   Musculoskeletal: Normal range of motion. No edema.   Neurological: He is alert and oriented to person, place, and time.   Skin: Skin is warm and dry.         ED Course   Critical Care    Date/Time: 10/21/2020 11:58 AM  Performed by: Harpreet Cramer MD  Authorized by: Harpreet Cramer MD   Total critical care time (exclusive of procedural time) : 0 minutes  Comments: Critical Care time:  30 minutes inclusive of direct patient care, review of previous records, interpretation of labs, imaging and ekg, as well as discussion of my impression and plan of care with the patient, family and other clinicians/consultants. This time is exclusive of any separate billable procedures and of treating other patients. Critical care was required for this patient who presented with NSTEMI, paroxysmal SVT requiring my emergent evaluation and management in the emergency department.           Labs Reviewed   CBC W/ AUTO DIFFERENTIAL - Abnormal; Notable for the following components:       Result Value    Hemoglobin 13.6 (*)     RDW 14.9 (*)     Platelets 142 (*)     Gran% 73.9 (*)     Lymph% 13.8 (*)     All other components within normal limits   COMPREHENSIVE METABOLIC PANEL - Abnormal; Notable for the following components:    BUN, Bld 31 (*)     Creatinine 1.6 (*)     Calcium 8.6 (*)     Albumin 3.2 (*)     eGFR if  43 (*)     eGFR if non  37 (*)     All other components within normal limits   TROPONIN I - Abnormal; Notable for the following components:    Troponin I 1.437  (*)     All other components within normal limits   TSH   SARS-COV-2 RNA AMPLIFICATION, QUAL   PROTIME-INR   APTT     EKG Readings: (Independently Interpreted)   09:51am EKG: SVT, rate 165 bpm, non-specific ST/T wave abnormality   Other EKG Interpretations: 09:58am Repeat EKG: normal sinus rhythm at 85 bpm, normal EKG.     ECG Results          EKG 12-lead (Final result)  Result time 10/21/20 11:34:52    Final result by Interface, Lab In [facility] (10/21/20 11:34:52)                 Narrative:    Test Reason : R07.9,    Vent. Rate : 085 BPM     Atrial Rate : 085 BPM     P-R Int : 168 ms          QRS Dur : 076 ms      QT Int : 330 ms       P-R-T Axes : 052 -14 069 degrees     QTc Int : 392 ms    Normal sinus rhythm  Nonspecific T wave abnormality  Abnormal ECG  When compared with ECG of 21-OCT-2020 09:51,  Vent. rate has decreased BY  80 BPM  ST no longer depressed in Inferior leads  Nonspecific T wave abnormality no longer evident in Inferior leads  Confirmed by El Dennis III, MD ([id number]) on 10/21/2020 11:34:38 AM    Referred By: [email or web address]   SELF           Confirmed By:El Dennis III, MD                             EKG 12-lead (Final result)  Result time 10/21/20 11:34:48    Final result by Interface, Lab In [facility] (10/21/20 11:34:48)                 Narrative:    Test Reason : I47.1,    Vent. Rate : 165 BPM     Atrial Rate : 081 BPM     P-R Int : 000 ms          QRS Dur : 080 ms      QT Int : 256 ms       P-R-T Axes : 000 035 137 degrees     QTc Int : 424 ms    Supraventricular tachycardia  Nonspecific ST and T wave abnormality  Abnormal ECG  When compared with ECG of 16-SEP-2020 12:57,  Vent. rate has increased  BPM  Non-specific change in ST segment in Lateral leads  Nonspecific T wave abnormality now evident in Inferior leads  Confirmed by El Dennis III, MD ([id number]) on 10/21/2020 11:34:35 AM    Referred By: [email or web address]   SELF           Confirmed By:El Dennis III, MD                             Imaging Results          X-Ray Chest AP Portable (Final result)  Result time 10/21/20 11:02:53    Final result by Genoveva Schrader MD (10/21/20 11:02:53)                 Impression:      As above      Electronically signed by: Genoveva Schrader MD  Date:    10/21/2020  Time:    11:02             Narrative:    EXAMINATION:  XR CHEST AP PORTABLE    CLINICAL HISTORY:  Supraventricular tachycardia    TECHNIQUE:  Single frontal view of the chest was performed.    COMPARISON:  Most recent prior, September 16, 2020 and more remote imaging.  Chest CT June 2019    FINDINGS:  There is overall a diminished inspiration.  The cardiac silhouette is not enlarged.  Calcification is present along the wall of the aorta.  There is no pleural effusion.  Osseous structures show degenerative changes.  Soft tissue prominence of the upper mediastinum correlates to tortuous vascular structures.  Lung bases demonstrate mild prominence of interstitial markings with some mild superimposed airspace opacity on the left, airspace opacity representing a change compared to prior imaging.                                 Medical Decision Making:   Clinical Tests:   Lab Tests: Reviewed  Radiological Study: Reviewed  Medical Tests: Reviewed                   ED Course as of Oct 22 1305   Wed Oct 21, 2020   1021 I, Dr. Harpreet Cramer, personally performed the services described in this documentation. All medical record entries made by the scribe were at my direction and in my presence. I have reviewed the chart and agree that the record is accurate and complete.   Harpreet Cramer MD.      [NP]   1021 This is an emergent evaluation of a 89 y.o.male patient with presentation of tachycardia, SVT seen on initial EKG, now spontaneously converted to normal sinus rhythm..     Initial differentials include but are not limited to:  SVT, structural heart disease, electrolyte abnormality, lung process such as COPD or CHF, thyroid disease    Plan:  Basic labs,  troponin, TSH, EKG, cardiac monitoring,     [NP]   1150 Case discussed with Dr. Portillo, [facility], who states to admit to Hospital Medicine for observation, trend troponins, echocardiogram.  Agrees with heparin bolus/drip and aspirin    [NP]   1200 Case discussed with Dr. Westbrook, [facility], who will accept to their service.    [NP]   1205 Dr. Khan in ED, reviewed EKG. States can see if  needs, but nothing to do right now.    [NP]      ED Course User Index  [NP] Harpreet Cramer MD            Clinical Impression:       ICD-10-CM ICD-9-CM   1. NSTEMI (non-ST elevated myocardial infarction)  I21.4 410.70   2. SVT (supraventricular tachycardia)  I47.1 427.89   3. Chest pain  R07.9 786.50                          ED Disposition Condition    Admit                           Harpreet Cramer MD  10/22/20 [id number]

## 2020-10-21 NOTE — ED NOTES
Care assumed. Report received from Alok. Pt was transferred to ED from [facility] for SVT. Pt converted en route without medication. Pt noted to be on monitor with a rate of 70s. VS WNL.   Pt is alert, age appropriate and in no acute distress. Respirations are even and unlabored. Bilateral breath sounds are clear throughout chest. abd is soft, not tender and not distended. pt denies change in feeding, bowel or bladder habits. Skin is warm and color is appropriate for ethnicity.  No edema noted to bilateral lower extremities. Pt moves all extremities well. Conjunctivae normal. Pt is dressed appropriately and well groomed.     Pt had 18g to Left FA, saline locked.

## 2020-10-21 NOTE — SUBJECTIVE & OBJECTIVE
Past Medical History:   Diagnosis Date    Benign essential hypertension     Borderline glaucoma, open angle with borderline findings 10/19/2012    Constipation 5/23/2014    Erectile dysfunction     GERD (gastroesophageal reflux disease)     History of GI bleed 12/12/2016    Hyperlipidemia     Iron deficiency anemia 12/13/2016    Nuclear sclerosis 10/19/2012       Past Surgical History:   Procedure Laterality Date    CATARACT EXTRACTION W/  INTRAOCULAR LENS IMPLANT  12/5/12    OD w/     COLONOSCOPY N/A 10/31/2017    Procedure: COLONOSCOPY;  Surgeon: Owsaldo Zee MD;  Location: [facility];  Service: Endoscopy;  Laterality: N/A;    lump in back removed  2009    lump removal      head    PARS PLANA VITRECTOMY  12/11/12    OD w/ dr. tavarez    RETINAL DETACHMENT SURGERY  3/26/13    Right eye    TONSILLECTOMY         Review of patient's allergies indicates:   Allergen Reactions    Naproxen      Other reaction(s): bleeding    Naproxen sodium Other (See Comments)     Other reaction(s): stomch bleeding    Ticlopidine Other (See Comments)     Other reaction(s): extreme bleeding       No current facility-administered medications on file prior to encounter.      Current Outpatient Medications on File Prior to Encounter   Medication Sig    cyanocobalamin, vitamin B-12, 250 mcg Lozg Place 1 lozenge under the tongue once daily.    diltiaZEM (CARDIZEM CD) 240 MG 24 hr capsule Take 1 capsule (240 mg total) by mouth once daily.    docusate sodium (COLACE) 100 MG capsule Take 1 capsule (100 mg total) by mouth 2 (two) times daily.    donepeziL (ARICEPT) 5 MG tablet Take 1 tablet (5 mg total) by mouth every evening.    finasteride (PROSCAR) 5 mg tablet Take 1 tablet (5 mg total) by mouth once daily.    LOTEMAX 0.5 % DrpG INSTILL 1 DROP IN RIGHT EYE DAILY    omeprazole (PRILOSEC) 40 MG capsule Take 1 capsule (40 mg total) by mouth once daily.    pravastatin (PRAVACHOL) 80 MG tablet TAKE 1 TABLET  (80 MG TOTAL) BY MOUTH ONCE DAILY.    QUEtiapine (SEROQUEL) 25 MG Tab Take 1 tablet (25 mg total) by mouth every evening.    tamsulosin (FLOMAX) 0.4 mg Cap Take 1 capsule (0.4 mg total) by mouth nightly.     Family History     Problem Relation (Age of Onset)    Cancer Mother, Sister, Brother    Cataracts Paternal Grandmother    Diabetes Mother    Glaucoma Paternal Grandmother    Hypertension Father, Paternal Grandmother    Stroke Father        Tobacco Use    Smoking status: Former Smoker    Smokeless tobacco: Never Used   Substance and Sexual Activity    Alcohol use: Yes     Alcohol/week: 0.8 standard drinks     Types: 1 Standard drinks or equivalent per week     Comment: soically    Drug use: No    Sexual activity: Not on file     Review of Systems   Constitutional: Negative for diaphoresis.   Respiratory: Negative for cough and shortness of breath.    Cardiovascular: Negative for chest pain, palpitations and leg swelling.   Gastrointestinal: Negative for nausea and vomiting.   Neurological: Negative for dizziness and weakness.     Objective:     Vital Signs (Most Recent):  Temp: 98.1 °F (36.7 °C) (10/21/20 0951)  Pulse: 82 (10/21/20 1026)  Resp: 15 (10/21/20 1026)  BP: 109/74 (10/21/20 0951)  SpO2: 97 % (10/21/20 1026) Vital Signs (24h Range):  Temp:  [98.1 °F (36.7 °C)-98.7 °F (37.1 °C)] 98.1 °F (36.7 °C)  Pulse:  [] 82  Resp:  [15-20] 15  SpO2:  [94 %-97 %] 97 %  BP: (104-109)/(63-74) 109/74     Weight: 64 kg (141 lb 1.5 oz)  Body mass index is 19.14 kg/m².    Physical Exam  Constitutional:       Appearance: He is well-developed.   HENT:      Head: Normocephalic and atraumatic.   Neck:      Musculoskeletal: Neck supple.   Cardiovascular:      Rate and Rhythm: Normal rate and regular rhythm.      Heart sounds: Normal heart sounds. No murmur. No friction rub. No gallop.    Pulmonary:      Effort: Pulmonary effort is normal.      Breath sounds: Normal breath sounds.   Chest:      Chest wall: No  tenderness.   Abdominal:      General: There is no distension.      Tenderness: There is no abdominal tenderness.   Musculoskeletal: Normal range of motion.   Skin:     General: Skin is warm.   Neurological:      Mental Status: He is alert and oriented to person, place, and time.   Psychiatric:         Mood and Affect: Mood normal.             Significant Labs:   ABGs: No results for input(s): PH, PCO2, HCO3, POCSATURATED, BE, TOTALHB, COHB, METHB, O2HB, POCFIO2 in the last 48 hours.  CBC:   Recent Labs   Lab 10/21/20  1029   WBC 6.89   HGB 13.6*   HCT 42.5   *     CMP:   Recent Labs   Lab 10/21/20  1029      K 4.4      CO2 24      BUN 31*   CREATININE 1.6*   CALCIUM 8.6*   PROT 7.0   ALBUMIN 3.2*   BILITOT 0.7   ALKPHOS 80   AST 24   ALT 20   ANIONGAP 8   EGFRNONAA 37*     Cardiac Markers: No results for input(s): CKMB, MYOGLOBIN, BNP, TROPISTAT in the last 48 hours.  Coagulation: No results for input(s): PT, INR, APTT in the last 48 hours.  Lactic Acid: No results for input(s): LACTATE in the last 48 hours.  Lipid Panel: No results for input(s): CHOL, HDL, LDLCALC, TRIG, CHOLHDL in the last 48 hours.  Magnesium: No results for input(s): MG in the last 48 hours.  POCT Glucose: No results for input(s): POCTGLUCOSE in the last 48 hours.  Prealbumin: No results for input(s): PREALBUMIN in the last 48 hours.  TSH:   Recent Labs   Lab 10/21/20  1029   TSH 0.550     Urine Culture: No results for input(s): LABURIN in the last 48 hours.  Urine Studies: No results for input(s): COLORU, APPEARANCEUA, PHUR, SPECGRAV, PROTEINUA, GLUCUA, KETONESU, BILIRUBINUA, OCCULTUA, NITRITE, UROBILINOGEN, LEUKOCYTESUR, RBCUA, WBCUA, BACTERIA, SQUAMEPITHEL, HYALINECASTS in the last 48 hours.    Invalid input(s): WRIGHTSUR    Significant Imaging: I have reviewed all pertinent imaging results/findings within the past 24 hours.

## 2020-10-21 NOTE — HPI
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 He also has a past surgical history of tonsillectomy, retinal detachment, pars plana vitrectomy, colonoscopy, and cataract implants. He does not drink alcohol, use illicit drugs, or smoke cigarettes. His PCP is Dr. Lindsey Du. He presents to [facility] ED via ambulance with increased heart rate by nursing home staff. Patient is confused, unable to obtain assessment. No complaints of pain currently. ED workup noted for hemoglobin 13.6, BUN--31, creatinine--1.6, calcium--8.6, elevated troponin, negative COVID. CXR-- There is overall a diminished inspiration.  The cardiac silhouette is not enlarged.  Calcification is present along the wall of the aorta.  There is no pleural effusion.  Osseous structures show degenerative changes.  Soft tissue prominence of the upper mediastinum correlates to tortuous vascular structures.  Lung bases demonstrate mild prominence of interstitial markings with some mild superimposed airspace opacity on the left, airspace opacity representing a change compared to prior imaging. He was admitted to the [facility] medicine department for further care.

## 2020-10-21 NOTE — H&P
[facility] Medicine  History & Physical    Patient Name: Toby Green  MRN: [id number]  Admission Date: 10/21/2020  Attending Physician: Urszula Barros*   Primary Care Provider: Lindsey Du MD         Patient information was obtained from patient, past medical records and ER records.     Subjective:     Principal Problem:NSTEMI (non-ST elevated myocardial infarction)    Chief Complaint:   Chief Complaint   Patient presents with    Tachycardia     Patient found to have increased heartrate by nursing home staff. Presents awake, alert, oriented with no c/o pain or discomfort. Skin w/d. Resp unlabored.         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 He also has a past surgical history of tonsillectomy, retinal detachment, pars plana vitrectomy, colonoscopy, and cataract implants. He does not drink alcohol, use illicit drugs, or smoke cigarettes. His PCP is Dr. Lindsey Du. He presents to [facility] ED via ambulance with increased heart rate by nursing home staff. Patient is confused, unable to obtain assessment. No complaints of pain currently. ED workup noted for hemoglobin 13.6, BUN--31, creatinine--1.6, calcium--8.6, elevated troponin, negative COVID. CXR-- There is overall a diminished inspiration.  The cardiac silhouette is not enlarged.  Calcification is present along the wall of the aorta.  There is no pleural effusion.  Osseous structures show degenerative changes.  Soft tissue prominence of the upper mediastinum correlates to tortuous vascular structures.  Lung bases demonstrate mild prominence of interstitial markings with some mild  superimposed airspace opacity on the left, airspace opacity representing a change compared to prior imaging. He was admitted to the [facility] medicine department for further care.     Past Medical History:   Diagnosis Date    Benign essential hypertension     Borderline glaucoma, open angle with borderline findings 10/19/2012    Constipation 5/23/2014    Erectile dysfunction     GERD (gastroesophageal reflux disease)     History of GI bleed 12/12/2016    Hyperlipidemia     Iron deficiency anemia 12/13/2016    Nuclear sclerosis 10/19/2012       Past Surgical History:   Procedure Laterality Date    CATARACT EXTRACTION W/  INTRAOCULAR LENS IMPLANT  12/5/12    OD w/     COLONOSCOPY N/A 10/31/2017    Procedure: COLONOSCOPY;  Surgeon: Oswaldo Zee MD;  Location: [facility] ENDO;  Service: Endoscopy;  Laterality: N/A;    lump in back removed  2009    lump removal      head    PARS PLANA VITRECTOMY  12/11/12    OD w/ dr. tavarez    RETINAL DETACHMENT SURGERY  3/26/13    Right eye    TONSILLECTOMY         Review of patient's allergies indicates:   Allergen Reactions    Naproxen      Other reaction(s): bleeding    Naproxen sodium Other (See Comments)     Other reaction(s): stomch bleeding    Ticlopidine Other (See Comments)     Other reaction(s): extreme bleeding       No current facility-administered medications on file prior to encounter.      Current Outpatient Medications on File Prior to Encounter   Medication Sig    cyanocobalamin, vitamin B-12, 250 mcg Lozg Place 1 lozenge under the tongue once daily.    diltiaZEM (CARDIZEM CD) 240 MG 24 hr capsule Take 1 capsule (240 mg total) by mouth once daily.    docusate sodium (COLACE) 100 MG capsule Take 1 capsule (100 mg total) by mouth 2 (two) times daily.    donepeziL (ARICEPT) 5 MG tablet Take 1 tablet (5 mg total) by mouth every evening.    finasteride (PROSCAR) 5 mg tablet Take 1 tablet (5 mg total) by mouth once daily.     LOTEMAX 0.5 % DrpG INSTILL 1 DROP IN RIGHT EYE DAILY    omeprazole (PRILOSEC) 40 MG capsule Take 1 capsule (40 mg total) by mouth once daily.    pravastatin (PRAVACHOL) 80 MG tablet TAKE 1 TABLET (80 MG TOTAL) BY MOUTH ONCE DAILY.    QUEtiapine (SEROQUEL) 25 MG Tab Take 1 tablet (25 mg total) by mouth every evening.    tamsulosin (FLOMAX) 0.4 mg Cap Take 1 capsule (0.4 mg total) by mouth nightly.     Family History     Problem Relation (Age of Onset)    Cancer Mother, Sister, Brother    Cataracts Paternal Grandmother    Diabetes Mother    Glaucoma Paternal Grandmother    Hypertension Father, Paternal Grandmother    Stroke Father        Tobacco Use    Smoking status: Former Smoker    Smokeless tobacco: Never Used   Substance and Sexual Activity    Alcohol use: Yes     Alcohol/week: 0.8 standard drinks     Types: 1 Standard drinks or equivalent per week     Comment: soically    Drug use: No    Sexual activity: Not on file     Review of Systems   Constitutional: Negative for diaphoresis.   Respiratory: Negative for cough and shortness of breath.    Cardiovascular: Negative for chest pain, palpitations and leg swelling.   Gastrointestinal: Negative for nausea and vomiting.   Neurological: Negative for dizziness and weakness.     Objective:     Vital Signs (Most Recent):  Temp: 98.1 °F (36.7 °C) (10/21/20 0951)  Pulse: 82 (10/21/20 1026)  Resp: 15 (10/21/20 1026)  BP: 109/74 (10/21/20 0951)  SpO2: 97 % (10/21/20 1026) Vital Signs (24h Range):  Temp:  [98.1 °F (36.7 °C)-98.7 °F (37.1 °C)] 98.1 °F (36.7 °C)  Pulse:  [] 82  Resp:  [15-20] 15  SpO2:  [94 %-97 %] 97 %  BP: (104-109)/(63-74) 109/74     Weight: 64 kg (141 lb 1.5 oz)  Body mass index is 19.14 kg/m².    Physical Exam  Constitutional:       Appearance: He is well-developed.   HENT:      Head: Normocephalic and atraumatic.   Neck:      Musculoskeletal: Neck supple.   Cardiovascular:      Rate and Rhythm: Normal rate and regular rhythm.      Heart  sounds: Normal heart sounds. No murmur. No friction rub. No gallop.    Pulmonary:      Effort: Pulmonary effort is normal.      Breath sounds: Normal breath sounds.   Chest:      Chest wall: No tenderness.   Abdominal:      General: There is no distension.      Tenderness: There is no abdominal tenderness.   Musculoskeletal: Normal range of motion.   Skin:     General: Skin is warm.   Neurological:      Mental Status: He is alert and oriented to person, place, and time.   Psychiatric:         Mood and Affect: Mood normal.             Significant Labs:   ABGs: No results for input(s): PH, PCO2, HCO3, POCSATURATED, BE, TOTALHB, COHB, METHB, O2HB, POCFIO2 in the last 48 hours.  CBC:   Recent Labs   Lab 10/21/20  1029   WBC 6.89   HGB 13.6*   HCT 42.5   *     CMP:   Recent Labs   Lab 10/21/20  1029      K 4.4      CO2 24      BUN 31*   CREATININE 1.6*   CALCIUM 8.6*   PROT 7.0   ALBUMIN 3.2*   BILITOT 0.7   ALKPHOS 80   AST 24   ALT 20   ANIONGAP 8   EGFRNONAA 37*     Cardiac Markers: No results for input(s): CKMB, MYOGLOBIN, BNP, TROPISTAT in the last 48 hours.  Coagulation: No results for input(s): PT, INR, APTT in the last 48 hours.  Lactic Acid: No results for input(s): LACTATE in the last 48 hours.  Lipid Panel: No results for input(s): CHOL, HDL, LDLCALC, TRIG, CHOLHDL in the last 48 hours.  Magnesium: No results for input(s): MG in the last 48 hours.  POCT Glucose: No results for input(s): POCTGLUCOSE in the last 48 hours.  Prealbumin: No results for input(s): PREALBUMIN in the last 48 hours.  TSH:   Recent Labs   Lab 10/21/20  1029   TSH 0.550     Urine Culture: No results for input(s): LABURIN in the last 48 hours.  Urine Studies: No results for input(s): COLORU, APPEARANCEUA, PHUR, SPECGRAV, PROTEINUA, GLUCUA, KETONESU, BILIRUBINUA, OCCULTUA, NITRITE, UROBILINOGEN, LEUKOCYTESUR, RBCUA, WBCUA, BACTERIA, SQUAMEPITHEL, HYALINECASTS in the last 48 hours.    Invalid input(s):  MISHEL    Significant Imaging: I have reviewed all pertinent imaging results/findings within the past 24 hours.    Assessment/Plan:     * NSTEMI (non-ST elevated myocardial infarction)  SVT (supraventricular tachycardia)  EKG  Consult [facility] cardiology  Heparin gtt  Loaded with ASA in ED  Troponin elevated trend      PRESLEY (acute kidney injury)  Monitor BMP daily.       Late onset Alzheimer's disease with behavioral disturbance  Continue seroquel      Constipation  Continue docusate      Benign prostatic hyperplasia without lower urinary tract symptoms  Continue Flomax and Fenastrided      GERD (gastroesophageal reflux disease)  PPI      Hyperlipidemia  Continue Pravachol      VTE Risk Mitigation (From admission, onward)         Ordered     IP VTE HIGH RISK PATIENT  Once      10/21/20 1615     Place sequential compression device  Until discontinued      10/21/20 1615     heparin 25,000 units in dextrose 5% 250 mL (100 units/mL) infusion LOW INTENSITY nomogram - [facility]  Continuous     Question:  Heparin Infusion Adjustment (DO NOT MODIFY ANSWER)  Answer:  \\[email or web address]\epic\Images\Pharmacy\HeparinInfusions\heparin LOW INTENSITY nomogram for [facility] [id number]    10/21/20 1152     heparin 25,000 units in dextrose 5% (100 units/ml) IV bolus from bag - ADDITIONAL PRN BOLUS - 60 units/kg (max bolus 4000 units)  As needed (PRN)     Question:  Heparin Infusion Adjustment (DO NOT MODIFY ANSWER)  Answer:  \\[email or web address]\epic\Images\Pharmacy\HeparinInfusions\heparin LOW INTENSITY nomogram for [facility] [id number].pdf    10/21/20 1152     heparin 25,000 units in dextrose 5% (100 units/ml) IV bolus from bag - ADDITIONAL PRN BOLUS - 30 units/kg (max bolus 4000 units)  As needed (PRN)     Question:  Heparin Infusion Adjustment (DO NOT MODIFY ANSWER)  Answer:  \\[email or web address]\epic\Images\Pharmacy\HeparinInfusions\heparin LOW INTENSITY nomogram for [facility] [id number]    10/21/20 1152                   Sanchez Aguirre NP  Department of Hospital  Medicine   [facility]

## 2020-10-22 LAB
ALBUMIN SERPL BCP-MCNC: 3 G/DL (ref 3.5–5.2)
ALP SERPL-CCNC: 73 U/L (ref 55–135)
ALT SERPL W/O P-5'-P-CCNC: 18 U/L (ref 10–44)
ANION GAP SERPL CALC-SCNC: 7 MMOL/L (ref 8–16)
ANION GAP SERPL CALC-SCNC: 7 MMOL/L (ref 8–16)
APTT BLDCRRT: 39.5 SEC (ref 21–32)
APTT BLDCRRT: 55.9 SEC (ref 21–32)
AST SERPL-CCNC: 25 U/L (ref 10–40)
BASOPHILS # BLD AUTO: 0.05 K/UL (ref 0–0.2)
BASOPHILS # BLD AUTO: 0.05 K/UL (ref 0–0.2)
BASOPHILS NFR BLD: 0.5 % (ref 0–1.9)
BASOPHILS NFR BLD: 0.5 % (ref 0–1.9)
BILIRUB SERPL-MCNC: 0.7 MG/DL (ref 0.1–1)
BUN SERPL-MCNC: 26 MG/DL (ref 8–23)
BUN SERPL-MCNC: 26 MG/DL (ref 8–23)
CALCIUM SERPL-MCNC: 8.5 MG/DL (ref 8.7–10.5)
CALCIUM SERPL-MCNC: 8.5 MG/DL (ref 8.7–10.5)
CHLORIDE SERPL-SCNC: 107 MMOL/L (ref 95–110)
CHLORIDE SERPL-SCNC: 107 MMOL/L (ref 95–110)
CO2 SERPL-SCNC: 26 MMOL/L (ref 23–29)
CO2 SERPL-SCNC: 26 MMOL/L (ref 23–29)
CREAT SERPL-MCNC: 1.3 MG/DL (ref 0.5–1.4)
CREAT SERPL-MCNC: 1.3 MG/DL (ref 0.5–1.4)
DIFFERENTIAL METHOD: ABNORMAL
DIFFERENTIAL METHOD: ABNORMAL
EOSINOPHIL # BLD AUTO: 0.1 K/UL (ref 0–0.5)
EOSINOPHIL # BLD AUTO: 0.1 K/UL (ref 0–0.5)
EOSINOPHIL NFR BLD: 1.1 % (ref 0–8)
EOSINOPHIL NFR BLD: 1.1 % (ref 0–8)
ERYTHROCYTE [DISTWIDTH] IN BLOOD BY AUTOMATED COUNT: 15.1 % (ref 11.5–14.5)
ERYTHROCYTE [DISTWIDTH] IN BLOOD BY AUTOMATED COUNT: 15.1 % (ref 11.5–14.5)
EST. GFR  (AFRICAN AMERICAN): 56 ML/MIN/1.73 M^2
EST. GFR  (AFRICAN AMERICAN): 56 ML/MIN/1.73 M^2
EST. GFR  (NON AFRICAN AMERICAN): 48 ML/MIN/1.73 M^2
EST. GFR  (NON AFRICAN AMERICAN): 48 ML/MIN/1.73 M^2
GLUCOSE SERPL-MCNC: 92 MG/DL (ref 70–110)
GLUCOSE SERPL-MCNC: 92 MG/DL (ref 70–110)
HCT VFR BLD AUTO: 40 % (ref 40–54)
HCT VFR BLD AUTO: 40 % (ref 40–54)
HGB BLD-MCNC: 13.1 G/DL (ref 14–18)
HGB BLD-MCNC: 13.1 G/DL (ref 14–18)
IMM GRANULOCYTES # BLD AUTO: 0.02 K/UL (ref 0–0.04)
IMM GRANULOCYTES # BLD AUTO: 0.02 K/UL (ref 0–0.04)
IMM GRANULOCYTES NFR BLD AUTO: 0.2 % (ref 0–0.5)
IMM GRANULOCYTES NFR BLD AUTO: 0.2 % (ref 0–0.5)
LYMPHOCYTES # BLD AUTO: 1.2 K/UL (ref 1–4.8)
LYMPHOCYTES # BLD AUTO: 1.2 K/UL (ref 1–4.8)
LYMPHOCYTES NFR BLD: 12.6 % (ref 18–48)
LYMPHOCYTES NFR BLD: 12.6 % (ref 18–48)
MCH RBC QN AUTO: 29.2 PG (ref 27–31)
MCH RBC QN AUTO: 29.2 PG (ref 27–31)
MCHC RBC AUTO-ENTMCNC: 32.8 G/DL (ref 32–36)
MCHC RBC AUTO-ENTMCNC: 32.8 G/DL (ref 32–36)
MCV RBC AUTO: 89 FL (ref 82–98)
MCV RBC AUTO: 89 FL (ref 82–98)
MONOCYTES # BLD AUTO: 0.7 K/UL (ref 0.3–1)
MONOCYTES # BLD AUTO: 0.7 K/UL (ref 0.3–1)
MONOCYTES NFR BLD: 7 % (ref 4–15)
MONOCYTES NFR BLD: 7 % (ref 4–15)
NEUTROPHILS # BLD AUTO: 7.4 K/UL (ref 1.8–7.7)
NEUTROPHILS # BLD AUTO: 7.4 K/UL (ref 1.8–7.7)
NEUTROPHILS NFR BLD: 78.6 % (ref 38–73)
NEUTROPHILS NFR BLD: 78.6 % (ref 38–73)
NRBC BLD-RTO: 0 /100 WBC
NRBC BLD-RTO: 0 /100 WBC
PLATELET # BLD AUTO: 118 K/UL (ref 150–350)
PLATELET # BLD AUTO: 118 K/UL (ref 150–350)
PMV BLD AUTO: 10.9 FL (ref 9.2–12.9)
PMV BLD AUTO: 10.9 FL (ref 9.2–12.9)
POTASSIUM SERPL-SCNC: 4.1 MMOL/L (ref 3.5–5.1)
POTASSIUM SERPL-SCNC: 4.1 MMOL/L (ref 3.5–5.1)
PROT SERPL-MCNC: 6.7 G/DL (ref 6–8.4)
RBC # BLD AUTO: 4.48 M/UL (ref 4.6–6.2)
RBC # BLD AUTO: 4.48 M/UL (ref 4.6–6.2)
SODIUM SERPL-SCNC: 140 MMOL/L (ref 136–145)
SODIUM SERPL-SCNC: 140 MMOL/L (ref 136–145)
WBC # BLD AUTO: 9.38 K/UL (ref 3.9–12.7)
WBC # BLD AUTO: 9.38 K/UL (ref 3.9–12.7)

## 2020-10-22 PROCEDURE — 85025 COMPLETE CBC W/AUTO DIFF WBC: CPT

## 2020-10-22 PROCEDURE — 80053 COMPREHEN METABOLIC PANEL: CPT

## 2020-10-22 PROCEDURE — 25000003 PHARM REV CODE 250: Performed by: NURSE PRACTITIONER

## 2020-10-22 PROCEDURE — 85730 THROMBOPLASTIN TIME PARTIAL: CPT

## 2020-10-22 PROCEDURE — 11000001 HC ACUTE MED/SURG PRIVATE ROOM

## 2020-10-22 PROCEDURE — 85730 THROMBOPLASTIN TIME PARTIAL: CPT | Mod: 91

## 2020-10-22 PROCEDURE — 25000003 PHARM REV CODE 250: Performed by: FAMILY MEDICINE

## 2020-10-22 PROCEDURE — 36415 COLL VENOUS BLD VENIPUNCTURE: CPT

## 2020-10-22 RX ORDER — NAPROXEN SODIUM 220 MG/1
81 TABLET, FILM COATED ORAL DAILY
Status: DISCONTINUED | OUTPATIENT
Start: 2020-10-22 | End: 2020-10-26 | Stop reason: HOSPADM

## 2020-10-22 RX ORDER — OXCARBAZEPINE 150 MG/1
75 TABLET, FILM COATED ORAL NIGHTLY
COMMUNITY
End: 2021-05-01

## 2020-10-22 RX ORDER — DONEPEZIL HYDROCHLORIDE 10 MG/1
10 TABLET, FILM COATED ORAL NIGHTLY
COMMUNITY
Start: 2020-10-02

## 2020-10-22 RX ORDER — AMITRIPTYLINE HYDROCHLORIDE 25 MG/1
25 TABLET, FILM COATED ORAL NIGHTLY
COMMUNITY
End: 2021-05-01

## 2020-10-22 RX ORDER — CLONIDINE HYDROCHLORIDE 0.1 MG/1
0.1 TABLET ORAL EVERY 6 HOURS PRN
COMMUNITY

## 2020-10-22 RX ORDER — METOPROLOL TARTRATE 25 MG/1
12.5 TABLET ORAL 2 TIMES DAILY
Status: DISCONTINUED | OUTPATIENT
Start: 2020-10-22 | End: 2020-10-26 | Stop reason: HOSPADM

## 2020-10-22 RX ADMIN — ASPIRIN 81 MG 81 MG: 81 TABLET ORAL at 11:10

## 2020-10-22 RX ADMIN — METOPROLOL TARTRATE 12.5 MG: 25 TABLET, FILM COATED ORAL at 10:10

## 2020-10-22 RX ADMIN — PRAVASTATIN SODIUM 80 MG: 40 TABLET ORAL at 08:10

## 2020-10-22 RX ADMIN — TAMSULOSIN HYDROCHLORIDE 0.4 MG: 0.4 CAPSULE ORAL at 10:10

## 2020-10-22 RX ADMIN — QUETIAPINE FUMARATE 25 MG: 25 TABLET ORAL at 10:10

## 2020-10-22 RX ADMIN — DONEPEZIL HYDROCHLORIDE 5 MG: 5 TABLET, FILM COATED ORAL at 10:10

## 2020-10-22 RX ADMIN — PANTOPRAZOLE SODIUM 40 MG: 40 TABLET, DELAYED RELEASE ORAL at 08:10

## 2020-10-22 RX ADMIN — FINASTERIDE 5 MG: 5 TABLET, FILM COATED ORAL at 08:10

## 2020-10-22 RX ADMIN — DOCUSATE SODIUM 100 MG: 100 CAPSULE, LIQUID FILLED ORAL at 10:10

## 2020-10-22 RX ADMIN — DOCUSATE SODIUM 100 MG: 100 CAPSULE, LIQUID FILLED ORAL at 08:10

## 2020-10-22 RX ADMIN — METOPROLOL TARTRATE 12.5 MG: 25 TABLET, FILM COATED ORAL at 11:10

## 2020-10-22 NOTE — PLAN OF CARE
Plan of care reviewed patient verbalized understanding. Medications administered. Cardiac monitoring. Safety maintained bed in the lowest position,call bell within reach, bed alarm on.

## 2020-10-22 NOTE — PHARMACY MED REC
"[facility]           Pharmacy  Admission Medication History     The home medication history was taken by George Montero PharmD.  Based on information gathered for medication list, you may go to "Admission" then "Reconcile Home Medications" tabs to review and/or act upon those items.     Potential issues to be addressed PRIOR TO DISCHARGE  o None  No current facility-administered medications on file prior to encounter.      Current Outpatient Medications on File Prior to Encounter   Medication Sig Dispense Refill    amitriptyline (ELAVIL) 25 MG tablet Take 25 mg by mouth every evening.      cloNIDine (CATAPRES) 0.1 MG tablet Take 0.1 mg by mouth every 6 (six) hours as needed. PRN systolic >160 and/or diastolic >100.      diltiaZEM (CARDIZEM CD) 240 MG 24 hr capsule Take 1 capsule (240 mg total) by mouth once daily. 90 capsule 4    donepeziL (ARICEPT) 10 MG tablet Take 10 mg by mouth once daily.      finasteride (PROSCAR) 5 mg tablet Take 1 tablet (5 mg total) by mouth once daily. 90 tablet 4    multivitamin (THERAGRAN) tablet Take 1 tablet by mouth once daily.      OXcarbazepine (TRILEPTAL) 150 MG Tab Take 75 mg by mouth every evening.      pravastatin (PRAVACHOL) 80 MG tablet TAKE 1 TABLET (80 MG TOTAL) BY MOUTH ONCE DAILY. (Patient taking differently: Take 80 mg by mouth every evening. TAKE 1 TABLET (80 MG TOTAL) BY MOUTH ONCE DAILY.) 90 tablet 4    tamsulosin (FLOMAX) 0.4 mg Cap Take 1 capsule (0.4 mg total) by mouth nightly. 90 capsule 4    cyanocobalamin, vitamin B-12, 250 mcg Lozg Place 1 lozenge under the tongue once daily. 100 lozenge 4    [DISCONTINUED] docusate sodium (COLACE) 100 MG capsule Take 1 capsule (100 mg total) by mouth 2 (two) times daily. 180 capsule 4    [DISCONTINUED] donepeziL (ARICEPT) 5 MG tablet Take 1 tablet (5 mg total) by mouth every evening. (Patient taking differently: Take 10 mg by mouth every evening. ) 90 tablet 3    [DISCONTINUED] LOTEMAX 0.5 % " DrpG INSTILL 1 DROP IN RIGHT EYE DAILY  6    [DISCONTINUED] omeprazole (PRILOSEC) 40 MG capsule Take 1 capsule (40 mg total) by mouth once daily. 90 capsule 3    [DISCONTINUED] QUEtiapine (SEROQUEL) 25 MG Tab Take 1 tablet (25 mg total) by mouth every evening. 30 tablet 11       Please address this information as you see fit.  Feel free to contact us if you have any questions or require assistance.    George Montero, PharmD  [phone]                .    .

## 2020-10-22 NOTE — ASSESSMENT & PLAN NOTE
SVT (supraventricular tachycardia)    Patient admitted with SVT and converted to sinus. Elevated troponin likely related to demand ishemia.  Will follow cardiology's recs for medical management with beta blocker.     --Cont tele  --We appreciate [facility] cardiology  ---Started Heparin gtt now stopped  --Loaded with ASA in ED

## 2020-10-22 NOTE — PLAN OF CARE
Patient sitting up in bed, alert and oriented to self only.No complaints of pain or distress noted at this time. NSR on tele. Safety precautions maintained, telesitter at bedside.

## 2020-10-22 NOTE — PROGRESS NOTES
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 He also has a past surgical history of tonsillectomy, retinal detachment, pars plana vitrectomy, colonoscopy, and cataract implants. He does not drink alcohol, use illicit drugs, or smoke cigarettes. His PCP is Dr. Lindsey Du. He presents to [facility] ED via ambulance with increased heart rate by nursing home staff. Patient is confused, unable to obtain assessment. No complaints of pain currently. ED workup noted for hemoglobin 13.6, BUN--31, creatinine--1.6, calcium--8.6, elevated troponin, negative COVID. CXR-- There is overall a diminished inspiration.  The cardiac silhouette is not enlarged.  Calcification is present along the wall of the aorta.  There is no pleural effusion.  Osseous structures show degenerative changes.  Soft tissue prominence of the upper mediastinum correlates to tortuous vascular structures.  Lung bases demonstrate mild prominence of interstitial markings with some mild superimposed airspace opacity on the left, airspace opacity representing a change compared to prior imaging. He was admitted to the [facility] medicine department for further care.     Overview/Hospital Course:  The patient was admitted to the [facility]  medicine department for further care. [facility] cardiology was consulted. Starting low dose BB.     Past Medical History:   Diagnosis Date    Benign essential hypertension     Borderline glaucoma, open angle with borderline findings 10/19/2012    Constipation 5/23/2014    Erectile dysfunction     GERD (gastroesophageal reflux disease)     History of GI bleed 12/12/2016    Hyperlipidemia     Iron deficiency anemia 12/13/2016    Nuclear sclerosis 10/19/2012       Past Surgical History:   Procedure Laterality Date    CATARACT EXTRACTION W/  INTRAOCULAR LENS IMPLANT  12/5/12    OD w/     COLONOSCOPY N/A 10/31/2017    Procedure: COLONOSCOPY;  Surgeon: Oswaldo Zee MD;  Location: [facility] ENDO;  Service: Endoscopy;  Laterality: N/A;    lump in back removed  2009    lump removal      head    PARS PLANA VITRECTOMY  12/11/12    OD w/ dr. tavarez    RETINAL DETACHMENT SURGERY  3/26/13    Right eye    TONSILLECTOMY         Review of patient's allergies indicates:   Allergen Reactions    Naproxen      Other reaction(s): bleeding    Naproxen sodium Other (See Comments)     Other reaction(s): stomch bleeding    Ticlopidine Other (See Comments)     Other reaction(s): extreme bleeding       No current facility-administered medications on file prior to encounter.      Current Outpatient Medications on File Prior to Encounter   Medication Sig    amitriptyline (ELAVIL) 25 MG tablet Take 25 mg by mouth every evening.    cloNIDine (CATAPRES) 0.1 MG tablet Take 0.1 mg by mouth every 6 (six) hours as needed. PRN systolic >160 and/or diastolic >100.    donepeziL (ARICEPT) 10 MG tablet Take 10 mg by mouth once daily.    finasteride (PROSCAR) 5 mg tablet Take 1 tablet (5 mg total) by mouth once daily.    multivitamin (THERAGRAN) tablet Take 1 tablet by mouth once daily.    OXcarbazepine (TRILEPTAL) 150 MG Tab Take 75 mg by mouth every evening.    pravastatin (PRAVACHOL) 80 MG tablet TAKE 1 TABLET (80 MG TOTAL)  BY MOUTH ONCE DAILY. (Patient taking differently: Take 80 mg by mouth every evening. TAKE 1 TABLET (80 MG TOTAL) BY MOUTH ONCE DAILY.)    tamsulosin (FLOMAX) 0.4 mg Cap Take 1 capsule (0.4 mg total) by mouth nightly.    [DISCONTINUED] diltiaZEM (CARDIZEM CD) 240 MG 24 hr capsule Take 1 capsule (240 mg total) by mouth once daily.    cyanocobalamin, vitamin B-12, 250 mcg Lozg Place 1 lozenge under the tongue once daily.    [DISCONTINUED] docusate sodium (COLACE) 100 MG capsule Take 1 capsule (100 mg total) by mouth 2 (two) times daily.    [DISCONTINUED] donepeziL (ARICEPT) 5 MG tablet Take 1 tablet (5 mg total) by mouth every evening. (Patient taking differently: Take 10 mg by mouth every evening. )    [DISCONTINUED] LOTEMAX 0.5 % DrpG INSTILL 1 DROP IN RIGHT EYE DAILY    [DISCONTINUED] omeprazole (PRILOSEC) 40 MG capsule Take 1 capsule (40 mg total) by mouth once daily.    [DISCONTINUED] QUEtiapine (SEROQUEL) 25 MG Tab Take 1 tablet (25 mg total) by mouth every evening.     Family History     Problem Relation (Age of Onset)    Cancer Mother, Sister, Brother    Cataracts Paternal Grandmother    Diabetes Mother    Glaucoma Paternal Grandmother    Hypertension Father, Paternal Grandmother    Stroke Father        Tobacco Use    Smoking status: Former Smoker    Smokeless tobacco: Never Used   Substance and Sexual Activity    Alcohol use: Yes     Alcohol/week: 0.8 standard drinks     Types: 1 Standard drinks or equivalent per week     Comment: soically    Drug use: No    Sexual activity: Not on file     Review of Systems   Unable to perform ROS: Dementia     Objective:     Vital Signs (Most Recent):  Temp: 97.9 °F (36.6 °C) (10/22/20 1219)  Pulse: 98 (10/22/20 1219)  Resp: 16 (10/22/20 1219)  BP: (!) 155/73 (10/22/20 1219)  SpO2: (!) 94 % (10/22/20 1219) Vital Signs (24h Range):  Temp:  [97.4 °F (36.3 °C)-100.8 °F (38.2 °C)] 97.9 °F (36.6 °C)  Pulse:  [65-98] 98  Resp:  [16-18] 16  SpO2:  [94 %-100 %] 94  %  BP: (115-155)/(58-74) 155/73     Weight: 63 kg (138 lb 14.2 oz)  Body mass index is 18.84 kg/m².    Physical Exam  Vitals signs and nursing note reviewed.   Constitutional:       Appearance: Normal appearance. He is well-developed.   HENT:      Head: Normocephalic and atraumatic.      Mouth/Throat:      Mouth: Mucous membranes are moist.   Eyes:      Pupils: Pupils are equal, round, and reactive to light.   Neck:      Musculoskeletal: Neck supple.   Cardiovascular:      Rate and Rhythm: Normal rate and regular rhythm.      Heart sounds: Normal heart sounds. No murmur. No friction rub. No gallop.    Pulmonary:      Effort: Pulmonary effort is normal.      Breath sounds: Normal breath sounds.   Chest:      Chest wall: No tenderness.   Abdominal:      General: There is no distension.      Tenderness: There is no abdominal tenderness.   Musculoskeletal: Normal range of motion.   Skin:     General: Skin is warm.      Capillary Refill: Capillary refill takes less than 2 seconds.   Neurological:      Mental Status: He is alert and oriented to person, place, and time.   Psychiatric:         Mood and Affect: Mood normal.           CRANIAL NERVES     CN III, IV, VI   Pupils are equal, round, and reactive to light.       Significant Labs:   ABGs: No results for input(s): PH, PCO2, HCO3, POCSATURATED, BE, TOTALHB, COHB, METHB, O2HB, POCFIO2 in the last 48 hours.  CBC:   Recent Labs   Lab 10/21/20  1029 10/22/20  0214   WBC 6.89 9.38  9.38   HGB 13.6* 13.1*  13.1*   HCT 42.5 40.0  40.0   * 118*  118*     CMP:   Recent Labs   Lab 10/21/20  1029 10/22/20  0214    140  140   K 4.4 4.1  4.1    107  107   CO2 24 26  26    92  92   BUN 31* 26*  26*   CREATININE 1.6* 1.3  1.3   CALCIUM 8.6* 8.5*  8.5*   PROT 7.0 6.7   ALBUMIN 3.2* 3.0*   BILITOT 0.7 0.7   ALKPHOS 80 73   AST 24 25   ALT 20 18   ANIONGAP 8 7*  7*   EGFRNONAA 37* 48*  48*     Cardiac Markers: No results for input(s): CKMB,  MYOGLOBIN, BNP, TROPISTAT in the last 48 hours.  Coagulation:   Recent Labs   Lab 10/21/20  1029  10/22/20  0406   INR 1.0  --   --    APTT 29.0   < > 39.5*    < > = values in this interval not displayed.     Lactic Acid: No results for input(s): LACTATE in the last 48 hours.  Lipid Panel: No results for input(s): CHOL, HDL, LDLCALC, TRIG, CHOLHDL in the last 48 hours.  Magnesium: No results for input(s): MG in the last 48 hours.  POCT Glucose: No results for input(s): POCTGLUCOSE in the last 48 hours.  Prealbumin: No results for input(s): PREALBUMIN in the last 48 hours.  TSH:   Recent Labs   Lab 10/21/20  1029   TSH 0.550     Urine Culture: No results for input(s): LABURIN in the last 48 hours.  Urine Studies: No results for input(s): COLORU, APPEARANCEUA, PHUR, SPECGRAV, PROTEINUA, GLUCUA, KETONESU, BILIRUBINUA, OCCULTUA, NITRITE, UROBILINOGEN, LEUKOCYTESUR, RBCUA, WBCUA, BACTERIA, SQUAMEPITHEL, HYALINECASTS in the last 48 hours.    Invalid input(s): WRIGHTSUR    Significant Imaging: I have reviewed all pertinent imaging results/findings within the past 24 hours.      Assessment/Plan:      * NSTEMI (non-ST elevated myocardial infarction)  SVT (supraventricular tachycardia)    Patient admitted with SVT and converted to sinus. Elevated troponin likely related to demand ishemia.  Will follow cardiology's recs for medical management with beta blocker.     --Cont tele  --We appreciate [facility] cardiology  ---Started Heparin gtt now stopped  --Loaded with ASA in ED        PRESLEY (acute kidney injury)  Creatinine has improved---1.3 today.      Late onset Alzheimer's disease with behavioral disturbance  Continue seroquel      Constipation  Continue docusate      Benign prostatic hyperplasia without lower urinary tract symptoms  Continue Flomax and Fenastrided      GERD (gastroesophageal reflux disease)  PPI      Hyperlipidemia  Continue Pravachol        VTE Risk Mitigation (From admission, onward)         Ordered     IP VTE HIGH  RISK PATIENT  Once      10/21/20 1615     Place sequential compression device  Until discontinued      10/21/20 1615                Discharge Planning   ERNIE:      Code Status: Full Code   Is the patient medically ready for discharge?:     Reason for patient still in hospital (select all that apply): Treatment                     Sanchez Aguirre NP  Department of Hospital Medicine   [facility]

## 2020-10-22 NOTE — HOSPITAL COURSE
The patient was admitted to the [facility] medicine department for further care. [facility] cardiology was consulted. Starting low dose BB. Considering increasing BB 2/2 runs SVT. [facility]---we followed Cardiology's recs---low dose Metoprolol and we stopped Cardizem. Will need to follow up with his PCP in 2 weeks.

## 2020-10-22 NOTE — SUBJECTIVE & OBJECTIVE
Past Medical History:   Diagnosis Date    Benign essential hypertension     Borderline glaucoma, open angle with borderline findings 10/19/2012    Constipation 5/23/2014    Erectile dysfunction     GERD (gastroesophageal reflux disease)     History of GI bleed 12/12/2016    Hyperlipidemia     Iron deficiency anemia 12/13/2016    Nuclear sclerosis 10/19/2012       Past Surgical History:   Procedure Laterality Date    CATARACT EXTRACTION W/  INTRAOCULAR LENS IMPLANT  12/5/12    OD w/     COLONOSCOPY N/A 10/31/2017    Procedure: COLONOSCOPY;  Surgeon: Oswaldo Zee MD;  Location: [facility];  Service: Endoscopy;  Laterality: N/A;    lump in back removed  2009    lump removal      head    PARS PLANA VITRECTOMY  12/11/12    OD w/ dr. tavarez    RETINAL DETACHMENT SURGERY  3/26/13    Right eye    TONSILLECTOMY         Review of patient's allergies indicates:   Allergen Reactions    Naproxen      Other reaction(s): bleeding    Naproxen sodium Other (See Comments)     Other reaction(s): stomch bleeding    Ticlopidine Other (See Comments)     Other reaction(s): extreme bleeding       No current facility-administered medications on file prior to encounter.      Current Outpatient Medications on File Prior to Encounter   Medication Sig    amitriptyline (ELAVIL) 25 MG tablet Take 25 mg by mouth every evening.    cloNIDine (CATAPRES) 0.1 MG tablet Take 0.1 mg by mouth every 6 (six) hours as needed. PRN systolic >160 and/or diastolic >100.    donepeziL (ARICEPT) 10 MG tablet Take 10 mg by mouth once daily.    finasteride (PROSCAR) 5 mg tablet Take 1 tablet (5 mg total) by mouth once daily.    multivitamin (THERAGRAN) tablet Take 1 tablet by mouth once daily.    OXcarbazepine (TRILEPTAL) 150 MG Tab Take 75 mg by mouth every evening.    pravastatin (PRAVACHOL) 80 MG tablet TAKE 1 TABLET (80 MG TOTAL) BY MOUTH ONCE DAILY. (Patient taking differently: Take 80 mg by mouth every evening. TAKE 1  TABLET (80 MG TOTAL) BY MOUTH ONCE DAILY.)    tamsulosin (FLOMAX) 0.4 mg Cap Take 1 capsule (0.4 mg total) by mouth nightly.    [DISCONTINUED] diltiaZEM (CARDIZEM CD) 240 MG 24 hr capsule Take 1 capsule (240 mg total) by mouth once daily.    cyanocobalamin, vitamin B-12, 250 mcg Lozg Place 1 lozenge under the tongue once daily.    [DISCONTINUED] docusate sodium (COLACE) 100 MG capsule Take 1 capsule (100 mg total) by mouth 2 (two) times daily.    [DISCONTINUED] donepeziL (ARICEPT) 5 MG tablet Take 1 tablet (5 mg total) by mouth every evening. (Patient taking differently: Take 10 mg by mouth every evening. )    [DISCONTINUED] LOTEMAX 0.5 % DrpG INSTILL 1 DROP IN RIGHT EYE DAILY    [DISCONTINUED] omeprazole (PRILOSEC) 40 MG capsule Take 1 capsule (40 mg total) by mouth once daily.    [DISCONTINUED] QUEtiapine (SEROQUEL) 25 MG Tab Take 1 tablet (25 mg total) by mouth every evening.     Family History     Problem Relation (Age of Onset)    Cancer Mother, Sister, Brother    Cataracts Paternal Grandmother    Diabetes Mother    Glaucoma Paternal Grandmother    Hypertension Father, Paternal Grandmother    Stroke Father        Tobacco Use    Smoking status: Former Smoker    Smokeless tobacco: Never Used   Substance and Sexual Activity    Alcohol use: Yes     Alcohol/week: 0.8 standard drinks     Types: 1 Standard drinks or equivalent per week     Comment: soically    Drug use: No    Sexual activity: Not on file     Review of Systems   Unable to perform ROS: Dementia     Objective:     Vital Signs (Most Recent):  Temp: 97.9 °F (36.6 °C) (10/22/20 1219)  Pulse: 98 (10/22/20 1219)  Resp: 16 (10/22/20 1219)  BP: (!) 155/73 (10/22/20 1219)  SpO2: (!) 94 % (10/22/20 1219) Vital Signs (24h Range):  Temp:  [97.4 °F (36.3 °C)-100.8 °F (38.2 °C)] 97.9 °F (36.6 °C)  Pulse:  [65-98] 98  Resp:  [16-18] 16  SpO2:  [94 %-100 %] 94 %  BP: (115-155)/(58-74) 155/73     Weight: 63 kg (138 lb 14.2 oz)  Body mass index is 18.84  kg/m².    Physical Exam  Vitals signs and nursing note reviewed.   Constitutional:       Appearance: Normal appearance. He is well-developed.   HENT:      Head: Normocephalic and atraumatic.      Mouth/Throat:      Mouth: Mucous membranes are moist.   Eyes:      Pupils: Pupils are equal, round, and reactive to light.   Neck:      Musculoskeletal: Neck supple.   Cardiovascular:      Rate and Rhythm: Normal rate and regular rhythm.      Heart sounds: Normal heart sounds. No murmur. No friction rub. No gallop.    Pulmonary:      Effort: Pulmonary effort is normal.      Breath sounds: Normal breath sounds.   Chest:      Chest wall: No tenderness.   Abdominal:      General: There is no distension.      Tenderness: There is no abdominal tenderness.   Musculoskeletal: Normal range of motion.   Skin:     General: Skin is warm.      Capillary Refill: Capillary refill takes less than 2 seconds.   Neurological:      Mental Status: He is alert and oriented to person, place, and time.   Psychiatric:         Mood and Affect: Mood normal.           CRANIAL NERVES     CN III, IV, VI   Pupils are equal, round, and reactive to light.       Significant Labs:   ABGs: No results for input(s): PH, PCO2, HCO3, POCSATURATED, BE, TOTALHB, COHB, METHB, O2HB, POCFIO2 in the last 48 hours.  CBC:   Recent Labs   Lab 10/21/20  1029 10/22/20  0214   WBC 6.89 9.38  9.38   HGB 13.6* 13.1*  13.1*   HCT 42.5 40.0  40.0   * 118*  118*     CMP:   Recent Labs   Lab 10/21/20  1029 10/22/20  0214    140  140   K 4.4 4.1  4.1    107  107   CO2 24 26  26    92  92   BUN 31* 26*  26*   CREATININE 1.6* 1.3  1.3   CALCIUM 8.6* 8.5*  8.5*   PROT 7.0 6.7   ALBUMIN 3.2* 3.0*   BILITOT 0.7 0.7   ALKPHOS 80 73   AST 24 25   ALT 20 18   ANIONGAP 8 7*  7*   EGFRNONAA 37* 48*  48*     Cardiac Markers: No results for input(s): CKMB, MYOGLOBIN, BNP, TROPISTAT in the last 48 hours.  Coagulation:   Recent Labs   Lab 10/21/20  1029   10/22/20  0406   INR 1.0  --   --    APTT 29.0   < > 39.5*    < > = values in this interval not displayed.     Lactic Acid: No results for input(s): LACTATE in the last 48 hours.  Lipid Panel: No results for input(s): CHOL, HDL, LDLCALC, TRIG, CHOLHDL in the last 48 hours.  Magnesium: No results for input(s): MG in the last 48 hours.  POCT Glucose: No results for input(s): POCTGLUCOSE in the last 48 hours.  Prealbumin: No results for input(s): PREALBUMIN in the last 48 hours.  TSH:   Recent Labs   Lab 10/21/20  1029   TSH 0.550     Urine Culture: No results for input(s): LABURIN in the last 48 hours.  Urine Studies: No results for input(s): COLORU, APPEARANCEUA, PHUR, SPECGRAV, PROTEINUA, GLUCUA, KETONESU, BILIRUBINUA, OCCULTUA, NITRITE, UROBILINOGEN, LEUKOCYTESUR, RBCUA, WBCUA, BACTERIA, SQUAMEPITHEL, HYALINECASTS in the last 48 hours.    Invalid input(s): MISHEL    Significant Imaging: I have reviewed all pertinent imaging results/findings within the past 24 hours.

## 2020-10-22 NOTE — PLAN OF CARE
Problem: Adult Inpatient Plan of Care  Goal: Plan of Care Review  Description: Chart and Care plan reviewed.  Care plan updated.     Outcome: Ongoing, Progressing

## 2020-10-22 NOTE — PLAN OF CARE
Sent updated clinical notes to [facility]       10/22/20 4740   Post-Acute Status   Post-Acute Authorization Placement   Post-Acute Placement Status Additional Clinical Requested     Avis ePna, RN, [facility], CMSRN  RN Transition Navigator  [phone]

## 2020-10-22 NOTE — PLAN OF CARE
Called the [facility] and ask that they send information on the patient including a current medication list.

## 2020-10-22 NOTE — CONSULTS
[facility]    CC: SVT     HPI 89 y.o. male with PMH of SVT, advanced dementia, BPH, HTN who presents to ED has his nursing home detected elevated HR. In ED EKG with evidence of SVT heart rate of 165 without ST or T wave abnormalities. He denied any symptoms on admission and had no hx of cardiac ischemia. Per chart review patient with spontaneous return to NSR. Troponin 1.63 on admission. Patient was admitted for SVT and troponin and started on heparin gtt. Febrile on admission. Cardiology consulted.    A comprehensive review of records was preformed    Past Medical History:   Diagnosis Date    Benign essential hypertension     Borderline glaucoma, open angle with borderline findings 10/19/2012    Constipation 5/23/2014    Erectile dysfunction     GERD (gastroesophageal reflux disease)     History of GI bleed 12/12/2016    Hyperlipidemia     Iron deficiency anemia 12/13/2016    Nuclear sclerosis 10/19/2012         Past Surgical History:   Procedure Laterality Date    CATARACT EXTRACTION W/  INTRAOCULAR LENS IMPLANT  12/5/12    OD w/     COLONOSCOPY N/A 10/31/2017    Procedure: COLONOSCOPY;  Surgeon: Oswaldo Zee MD;  Location: [facility];  Service: Endoscopy;  Laterality: N/A;    lump in back removed  2009    lump removal      head    PARS PLANA VITRECTOMY  12/11/12    OD w/ dr. tavarez    RETINAL DETACHMENT SURGERY  3/26/13    Right eye    TONSILLECTOMY         Social History  Social History     Tobacco Use    Smoking status: Former Smoker    Smokeless tobacco: Never Used   Substance Use Topics    Alcohol use: Yes     Alcohol/week: 0.8 standard drinks     Types: 1 Standard drinks or equivalent per week     Comment: soically    Drug use: No         Family History   Problem Relation Age of Onset    Cancer Mother     Diabetes Mother     Hypertension Father     Stroke Father     Cancer Sister     Cancer Brother     Cataracts Paternal Grandmother     Glaucoma Paternal  Grandmother     Hypertension Paternal Grandmother     Amblyopia Neg Hx     Blindness Neg Hx     Macular degeneration Neg Hx     Retinal detachment Neg Hx     Strabismus Neg Hx     Thyroid disease Neg Hx        Review of Systems  General ROS: negative for chills, fever or weight loss  ENT ROS: negative for epistaxis, sore throat or rhinorrhea  Respiratory ROS: no cough, shortness of breath, or wheezing  Cardiovascular ROS: no chest pain or dyspnea on exertion  Gastrointestinal ROS: no abdominal pain  Genito-Urinary ROS: no dysuria, trouble voiding, or hematuria  Musculoskeletal ROS: negative for gait disturbance or muscular weakness  Neurological ROS: no syncope or seizures; no ataxia  Dermatological ROS: negative for pruritis, rash and jaundice    Physical Examination  BP (!) 142/71   Pulse 65   Temp 97.7 °F (36.5 °C)   Resp 17   Ht 6' (1.829 m)   Wt 63 kg (138 lb 14.2 oz)   SpO2 95%   BMI 18.84 kg/m²   General appearance: alert, cooperative, no distress, elderly  HENT: Normocephalic, atraumatic, neck symmetrical, no nasal discharge   Eyes: conjunctivae/corneas clear, PERRL, EOM's intact  Lungs: clear to auscultation bilaterally, no dullness to percussion bilaterally  Heart: regular rate and rhythm without rub; no displacement of the PMI   Abdomen: soft, non-tender; bowel sounds normoactive; no organomegaly  Extremities: extremities symmetric; no clubbing, cyanosis, or edema  Integument: Skin color, texture, turgor normal; no rashes; hair distrubution normal  Neurologic: Alert and oriented only to self, normal strength  cognition     Labs:  Recent Results (from the past 336 hour(s))   Basic Metabolic Panel (BMP)    Collection Time: 10/22/20  2:14 AM   Result Value Ref Range    Sodium 140 136 - 145 mmol/L    Potassium 4.1 3.5 - 5.1 mmol/L    Chloride 107 95 - 110 mmol/L    CO2 26 23 - 29 mmol/L    BUN, Bld 26 (H) 8 - 23 mg/dL    Creatinine 1.3 0.5 - 1.4 mg/dL    Calcium 8.5 (L) 8.7 - 10.5 mg/dL    Anion  Gap 7 (L) 8 - 16 mmol/L     Recent Results (from the past 336 hour(s))   CBC with Automated Differential    Collection Time: 10/22/20  2:14 AM   Result Value Ref Range    WBC 9.38 3.90 - 12.70 K/uL    Hemoglobin 13.1 (L) 14.0 - 18.0 g/dL    Hematocrit 40.0 40.0 - 54.0 %    Platelets 118 (L) 150 - 350 K/uL   CBC auto differential    Collection Time: 10/22/20  2:14 AM   Result Value Ref Range    WBC 9.38 3.90 - 12.70 K/uL    Hemoglobin 13.1 (L) 14.0 - 18.0 g/dL    Hematocrit 40.0 40.0 - 54.0 %    Platelets 118 (L) 150 - 350 K/uL   CBC auto differential    Collection Time: 10/21/20 10:29 AM   Result Value Ref Range    WBC 6.89 3.90 - 12.70 K/uL    Hemoglobin 13.6 (L) 14.0 - 18.0 g/dL    Hematocrit 42.5 40.0 - 54.0 %    Platelets 142 (L) 150 - 350 K/uL     Recent Labs   Lab 10/21/20  2014   TROPONINI 1.636*       Imaging:  Imaging Results          X-Ray Chest AP Portable (Final result)  Result time 10/21/20 11:02:53    Final result by Genoveva Schrader MD (10/21/20 11:02:53)                 Impression:      As above      Electronically signed by: Genoveva Schrader MD  Date:    10/21/2020  Time:    11:02             Narrative:    EXAMINATION:  XR CHEST AP PORTABLE    CLINICAL HISTORY:  Supraventricular tachycardia    TECHNIQUE:  Single frontal view of the chest was performed.    COMPARISON:  Most recent prior, September 16, 2020 and more remote imaging.  Chest CT June 2019    FINDINGS:  There is overall a diminished inspiration.  The cardiac silhouette is not enlarged.  Calcification is present along the wall of the aorta.  There is no pleural effusion.  Osseous structures show degenerative changes.  Soft tissue prominence of the upper mediastinum correlates to tortuous vascular structures.  Lung bases demonstrate mild prominence of interstitial markings with some mild superimposed airspace opacity on the left, airspace opacity representing a change compared to prior imaging.                              ECHO  10/21/20:  · There is left ventricular concentric remodeling.  · LV not well visualized. LVEF liekly 40-50%. Recommend an alternative mode to more accurately assess for LVEF  · Indeterminate diastolic function.  · Normal right ventricular systolic function.  · The mitral valve is mildly sclerotic.  · Mild aortic valve stenosis.  · Aortic valve area is 1.78 cm2; peak velocity is 2.16 m/s; mean gradient is 13 mmHg.  · Mild to moderate tricuspid regurgitation.  · There is a large left pleural effusion.    EKG: 10/21/20  SVT with   spontaneous conversion to NSR    Assessment:   89 male with PHM of SVT, advanced dementia, BPH, HTN who presented with episode of SVT for unknown duration as he was unaware. Troponin 1.63 on admission with spontaneous conversion to sinus. Troponin trend 1.63--> 1.60--> 1.4 without new EKG changes. ECHO with preserved EF and no wall motion abnormalities. Still aortic stenosis as compared to previous echo.      Plan:   Can start lopressor 12.5 BID  Discontinue home cardizem  Continue ASA and statin  Stop heparin gtt      No further recs. Please contact with further questions.      Joana Townsend,    [facility] PGY-4  10/22/2020 9:38 AM

## 2020-10-22 NOTE — PLAN OF CARE
Patient Alert but disoriented    assisted resident from [facility]    Sent clinical notes via [employer] to [facility]    This  put name on white board and explained blue discharge folder to patient. Discharge planning brochure and/or business card given to patient.  Patient verbalized understanding.       10/22/20 2603   Discharge Assessment   Assessment Type Discharge Planning Assessment   Confirmed/corrected address and phone number on facesheet? Yes   Assessment information obtained from? Patient   Communicated expected length of stay with patient/caregiver yes   Prior to hospitilization cognitive status: Not Oriented to Place;Not Oriented to Time   Prior to hospitalization functional status: Independent;Assistive Equipment   Current cognitive status: Not Oriented to Place;Not Oriented to Time   Current Functional Status: Assistive Equipment;Needs Assistance   Lives With facility resident   Able to Return to Prior Arrangements yes   Is patient able to care for self after discharge? No   Readmission Within the Last 30 Days no previous admission in last 30 days   Patient currently being followed by outpatient case management? No   Patient currently receives any other outside agency services? No   Equipment Currently Used at Home wheelchair   Do you have any problems affording any of your prescribed medications? No   Is the patient taking medications as prescribed? yes   Does the patient have transportation home? Yes   Transportation Anticipated family or friend will provide   Does the patient receive services at the [facility]? No   DME Needed Upon Discharge  none   Patient/Family in Agreement with Plan yes     Avis Pena RN, CCM, CMSRN  RN Transition Navigator  [phone]

## 2020-10-23 LAB
ALBUMIN SERPL BCP-MCNC: 3 G/DL (ref 3.5–5.2)
ALP SERPL-CCNC: 73 U/L (ref 55–135)
ALT SERPL W/O P-5'-P-CCNC: 17 U/L (ref 10–44)
ANION GAP SERPL CALC-SCNC: 8 MMOL/L (ref 8–16)
ANION GAP SERPL CALC-SCNC: 8 MMOL/L (ref 8–16)
AST SERPL-CCNC: 23 U/L (ref 10–40)
BASOPHILS # BLD AUTO: 0.04 K/UL (ref 0–0.2)
BASOPHILS NFR BLD: 0.7 % (ref 0–1.9)
BILIRUB SERPL-MCNC: 0.5 MG/DL (ref 0.1–1)
BUN SERPL-MCNC: 21 MG/DL (ref 8–23)
BUN SERPL-MCNC: 21 MG/DL (ref 8–23)
CALCIUM SERPL-MCNC: 8.4 MG/DL (ref 8.7–10.5)
CALCIUM SERPL-MCNC: 8.4 MG/DL (ref 8.7–10.5)
CHLORIDE SERPL-SCNC: 106 MMOL/L (ref 95–110)
CHLORIDE SERPL-SCNC: 106 MMOL/L (ref 95–110)
CO2 SERPL-SCNC: 25 MMOL/L (ref 23–29)
CO2 SERPL-SCNC: 25 MMOL/L (ref 23–29)
CREAT SERPL-MCNC: 1.2 MG/DL (ref 0.5–1.4)
CREAT SERPL-MCNC: 1.2 MG/DL (ref 0.5–1.4)
DIFFERENTIAL METHOD: ABNORMAL
EOSINOPHIL # BLD AUTO: 0.1 K/UL (ref 0–0.5)
EOSINOPHIL NFR BLD: 2.3 % (ref 0–8)
ERYTHROCYTE [DISTWIDTH] IN BLOOD BY AUTOMATED COUNT: 14.7 % (ref 11.5–14.5)
EST. GFR  (AFRICAN AMERICAN): >60 ML/MIN/1.73 M^2
EST. GFR  (AFRICAN AMERICAN): >60 ML/MIN/1.73 M^2
EST. GFR  (NON AFRICAN AMERICAN): 53 ML/MIN/1.73 M^2
EST. GFR  (NON AFRICAN AMERICAN): 53 ML/MIN/1.73 M^2
GLUCOSE SERPL-MCNC: 72 MG/DL (ref 70–110)
GLUCOSE SERPL-MCNC: 72 MG/DL (ref 70–110)
HCT VFR BLD AUTO: 41.5 % (ref 40–54)
HGB BLD-MCNC: 13.5 G/DL (ref 14–18)
IMM GRANULOCYTES # BLD AUTO: 0.02 K/UL (ref 0–0.04)
IMM GRANULOCYTES NFR BLD AUTO: 0.3 % (ref 0–0.5)
LYMPHOCYTES # BLD AUTO: 1 K/UL (ref 1–4.8)
LYMPHOCYTES NFR BLD: 16.6 % (ref 18–48)
MCH RBC QN AUTO: 28.8 PG (ref 27–31)
MCHC RBC AUTO-ENTMCNC: 32.5 G/DL (ref 32–36)
MCV RBC AUTO: 89 FL (ref 82–98)
MONOCYTES # BLD AUTO: 0.6 K/UL (ref 0.3–1)
MONOCYTES NFR BLD: 10.3 % (ref 4–15)
NEUTROPHILS # BLD AUTO: 4 K/UL (ref 1.8–7.7)
NEUTROPHILS NFR BLD: 69.8 % (ref 38–73)
NRBC BLD-RTO: 0 /100 WBC
PLATELET # BLD AUTO: 125 K/UL (ref 150–350)
PMV BLD AUTO: 12.2 FL (ref 9.2–12.9)
POTASSIUM SERPL-SCNC: 4 MMOL/L (ref 3.5–5.1)
POTASSIUM SERPL-SCNC: 4 MMOL/L (ref 3.5–5.1)
PROT SERPL-MCNC: 6.7 G/DL (ref 6–8.4)
RBC # BLD AUTO: 4.69 M/UL (ref 4.6–6.2)
SODIUM SERPL-SCNC: 139 MMOL/L (ref 136–145)
SODIUM SERPL-SCNC: 139 MMOL/L (ref 136–145)
WBC # BLD AUTO: 5.72 K/UL (ref 3.9–12.7)

## 2020-10-23 PROCEDURE — 11000001 HC ACUTE MED/SURG PRIVATE ROOM

## 2020-10-23 PROCEDURE — 25000003 PHARM REV CODE 250: Performed by: FAMILY MEDICINE

## 2020-10-23 PROCEDURE — 80053 COMPREHEN METABOLIC PANEL: CPT

## 2020-10-23 PROCEDURE — 93005 ELECTROCARDIOGRAM TRACING: CPT

## 2020-10-23 PROCEDURE — 93010 EKG 12-LEAD: ICD-10-PCS | Mod: ,,, | Performed by: INTERNAL MEDICINE

## 2020-10-23 PROCEDURE — 94761 N-INVAS EAR/PLS OXIMETRY MLT: CPT

## 2020-10-23 PROCEDURE — 25000003 PHARM REV CODE 250: Performed by: NURSE PRACTITIONER

## 2020-10-23 PROCEDURE — 85025 COMPLETE CBC W/AUTO DIFF WBC: CPT

## 2020-10-23 PROCEDURE — 93010 ELECTROCARDIOGRAM REPORT: CPT | Mod: ,,, | Performed by: INTERNAL MEDICINE

## 2020-10-23 PROCEDURE — 36415 COLL VENOUS BLD VENIPUNCTURE: CPT

## 2020-10-23 RX ADMIN — PRAVASTATIN SODIUM 80 MG: 40 TABLET ORAL at 08:10

## 2020-10-23 RX ADMIN — ASPIRIN 81 MG 81 MG: 81 TABLET ORAL at 08:10

## 2020-10-23 RX ADMIN — METOPROLOL TARTRATE 12.5 MG: 25 TABLET, FILM COATED ORAL at 07:10

## 2020-10-23 RX ADMIN — DOCUSATE SODIUM 100 MG: 100 CAPSULE, LIQUID FILLED ORAL at 08:10

## 2020-10-23 RX ADMIN — FINASTERIDE 5 MG: 5 TABLET, FILM COATED ORAL at 08:10

## 2020-10-23 RX ADMIN — DOCUSATE SODIUM 100 MG: 100 CAPSULE, LIQUID FILLED ORAL at 07:10

## 2020-10-23 RX ADMIN — TAMSULOSIN HYDROCHLORIDE 0.4 MG: 0.4 CAPSULE ORAL at 07:10

## 2020-10-23 RX ADMIN — QUETIAPINE FUMARATE 25 MG: 25 TABLET ORAL at 07:10

## 2020-10-23 RX ADMIN — METOPROLOL TARTRATE 12.5 MG: 25 TABLET, FILM COATED ORAL at 08:10

## 2020-10-23 RX ADMIN — DONEPEZIL HYDROCHLORIDE 5 MG: 5 TABLET, FILM COATED ORAL at 07:10

## 2020-10-23 RX ADMIN — PANTOPRAZOLE SODIUM 40 MG: 40 TABLET, DELAYED RELEASE ORAL at 08:10

## 2020-10-23 NOTE — PROGRESS NOTES
[facility] Hospital Medicine  Progress Note    Patient Name: Toby Green  MRN: [id number]  Patient Class: IP- Inpatient   Admission Date: 10/21/2020  Length of Stay: 2 days  Attending Physician: Urszula Barros*  Primary Care Provider: Lindsey Du MD        Subjective:     Principal Problem:NSTEMI (non-ST elevated myocardial infarction)        HPI:  Toby Green is a 89 y/o with a pmh of Benign essential hypertension, Borderline glaucoma, open angle with borderline findings (10/19/2012), Constipation (5/23/2014), Erectile dysfunction, GERD (gastroesophageal reflux disease), History of GI bleed (12/12/2016), Hyperlipidemia, Iron deficiency anemia (12/13/2016), and Nuclear sclerosis (10/19/2012). He also has a past surgical history of tonsillectomy, retinal detachment, pars plana vitrectomy, colonoscopy, and cataract implants. He does not drink alcohol, use illicit drugs, or smoke cigarettes. His PCP is Dr. Lindsey Du. He presents to [facility] ED via ambulance with increased heart rate by nursing home staff. Patient is confused, unable to obtain assessment. No complaints of pain currently. ED workup noted for hemoglobin 13.6, BUN--31, creatinine--1.6, calcium--8.6, elevated troponin, negative COVID. CXR-- There is overall a diminished inspiration.  The cardiac silhouette is not enlarged.  Calcification is present along the wall of the aorta.  There is no pleural effusion.  Osseous structures show degenerative changes.  Soft tissue prominence of the upper mediastinum correlates to tortuous vascular structures.  Lung bases demonstrate mild prominence of interstitial markings with some mild superimposed airspace opacity on the left, airspace opacity representing a change compared to prior imaging. He was admitted to the [facility] medicine department for further care.     Overview/Hospital Course:  The patient was admitted to the [facility]  medicine department for further care. [facility] cardiology was consulted. Starting low dose BB. Considering increasing BB 2/2 runs SVT.     Interval History: No distress noted--will follow. Very sweet little man.     Review of Systems   Unable to perform ROS: Dementia     Objective:     Vital Signs (Most Recent):  Temp: 98.2 °F (36.8 °C) (10/23/20 1604)  Pulse: 61 (10/23/20 1604)  Resp: 17 (10/23/20 1604)  BP: (!) 141/68 (10/23/20 1604)  SpO2: 95 % (10/23/20 1604) Vital Signs (24h Range):  Temp:  [97.3 °F (36.3 °C)-98.4 °F (36.9 °C)] 98.2 °F (36.8 °C)  Pulse:  [] 61  Resp:  [14-18] 17  SpO2:  [94 %-96 %] 95 %  BP: (104-171)/(57-80) 141/68     Weight: 63 kg (138 lb 14.2 oz)  Body mass index is 18.84 kg/m².    Physical Exam  Vitals signs and nursing note reviewed.   Constitutional:       Appearance: Normal appearance. He is well-developed.   HENT:      Head: Normocephalic and atraumatic.      Mouth/Throat:      Mouth: Mucous membranes are moist.   Eyes:      Pupils: Pupils are equal, round, and reactive to light.   Neck:      Musculoskeletal: Neck supple.   Cardiovascular:      Rate and Rhythm: Normal rate and regular rhythm.      Heart sounds: Normal heart sounds. No murmur. No friction rub. No gallop.    Pulmonary:      Effort: Pulmonary effort is normal.      Breath sounds: Normal breath sounds.   Chest:      Chest wall: No tenderness.   Abdominal:      General: There is no distension.      Tenderness: There is no abdominal tenderness.   Musculoskeletal: Normal range of motion.   Skin:     General: Skin is warm.      Capillary Refill: Capillary refill takes less than 2 seconds.   Neurological:      Mental Status: He is alert and oriented to person, place, and time.   Psychiatric:         Mood and Affect: Mood normal.           CRANIAL NERVES     CN III, IV, VI   Pupils are equal, round, and reactive to light.       Significant Labs:   ABGs: No results for input(s): PH, PCO2, HCO3, POCSATURATED, BE, TOTALHB, COHB,  METHB, O2HB, POCFIO2 in the last 48 hours.  CBC:   Recent Labs   Lab 10/22/20  0214 10/23/20  0357   WBC 9.38  9.38 5.72   HGB 13.1*  13.1* 13.5*   HCT 40.0  40.0 41.5   *  118* 125*     CMP:   Recent Labs   Lab 10/22/20  0214 10/23/20  0357     140 139  139   K 4.1  4.1 4.0  4.0     107 106  106   CO2 26  26 25  25   GLU 92  92 72  72   BUN 26*  26* 21  21   CREATININE 1.3  1.3 1.2  1.2   CALCIUM 8.5*  8.5* 8.4*  8.4*   PROT 6.7 6.7   ALBUMIN 3.0* 3.0*   BILITOT 0.7 0.5   ALKPHOS 73 73   AST 25 23   ALT 18 17   ANIONGAP 7*  7* 8  8   EGFRNONAA 48*  48* 53*  53*     Cardiac Markers: No results for input(s): CKMB, MYOGLOBIN, BNP, TROPISTAT in the last 48 hours.  Coagulation:   Recent Labs   Lab 10/22/20  0406   APTT 39.5*     Lactic Acid: No results for input(s): LACTATE in the last 48 hours.  Lipid Panel: No results for input(s): CHOL, HDL, LDLCALC, TRIG, CHOLHDL in the last 48 hours.  Magnesium: No results for input(s): MG in the last 48 hours.  POCT Glucose: No results for input(s): POCTGLUCOSE in the last 48 hours.  Prealbumin: No results for input(s): PREALBUMIN in the last 48 hours.  TSH:   Recent Labs   Lab 10/21/20  1029   TSH 0.550     Urine Culture: No results for input(s): LABURIN in the last 48 hours.  Urine Studies: No results for input(s): COLORU, APPEARANCEUA, PHUR, SPECGRAV, PROTEINUA, GLUCUA, KETONESU, BILIRUBINUA, OCCULTUA, NITRITE, UROBILINOGEN, LEUKOCYTESUR, RBCUA, WBCUA, BACTERIA, SQUAMEPITHEL, HYALINECASTS in the last 48 hours.    Invalid input(s): WRIGHTSUR    Significant Imaging: I have reviewed all pertinent imaging results/findings within the past 24 hours.      Assessment/Plan:      * NSTEMI (non-ST elevated myocardial infarction)  SVT (supraventricular tachycardia)    Patient admitted with SVT and converted to sinus. Elevated troponin likely related to demand ishemia.  Will follow cardiology's recs for medical management with beta blocker.      --Cont tele  --We appreciate [facility]ology  ---Started Heparin gtt now stopped  --Loaded with ASA in ED  --added BB        PRESLEY (acute kidney injury)  Creatinine has improved---1.2 today.      Late onset Alzheimer's disease with behavioral disturbance  Continue seroquel      Constipation  Continue docusate      Benign prostatic hyperplasia without lower urinary tract symptoms  Continue Flomax and Fenastrided      GERD (gastroesophageal reflux disease)  PPI      Hyperlipidemia  Continue Pravachol        VTE Risk Mitigation (From admission, onward)         Ordered     IP VTE HIGH RISK PATIENT  Once      10/21/20 1615     Place sequential compression device  Until discontinued      10/21/20 1615                Discharge Planning   ERNIE:      Code Status: Full Code   Is the patient medically ready for discharge?:     Reason for patient still in hospital (select all that apply): Treatment  Discharge Plan A: Return to nursing home                  Sanchez Aguirre NP  Department of Hospital Medicine   [facility]

## 2020-10-23 NOTE — NURSING
Pt was eating breakfast and staff noted HR as high as 202. Pt stayed within 165-181 and was asymptomatic. Denied SOB or CP. Morning metoprolol given. Pt now running 83 after reevaluation of medication effectiveness. Notified on call cardiologist Dr Portillo for advisement. Will relay to Dr Townsend for evaluation and treatment if indicated. Pt in bed resting at this time.

## 2020-10-23 NOTE — PLAN OF CARE
Afebrile during the night. VSS. Denies any pain or SOB. Pt very confused - only oriented to person. Sheela sitter in use and bed alarm. X1 episode of 4 minutes of SVT and converted to SB. See previous note for details. Urine output adequate. Safety precautions in place.

## 2020-10-23 NOTE — NURSING
Notified by telemetry monitor tech pt HR 190s. Upon assessment of rhythm - pt in SVT. Pt standing on side the bed to urinate with PCT. VN cued into room. Once connected to vital sign machine, pt converted back to bradycardic rhythm in the 40s. Pt in SVT for approximately 4 minutes. Asymptomatic during episode. BP WNL, sats 96% on RA. Notified on call NP, CHRIS Canela.   Orders for EKG. Morning labs pending. Telemetry strips printed and placed in the pt chart.

## 2020-10-23 NOTE — PLAN OF CARE
0405:  Patient stood up to urinate.  Floor nurse in the room.  Patient put back to bed.  0407: HR 40s  0410: HR 60 to 70s with frequent PACs.  /80. Patient was asymptomatic.

## 2020-10-23 NOTE — SUBJECTIVE & OBJECTIVE
Interval History: No distress noted--will follow. Very sweet little man.     Review of Systems   Unable to perform ROS: Dementia     Objective:     Vital Signs (Most Recent):  Temp: 98.2 °F (36.8 °C) (10/23/20 1604)  Pulse: 61 (10/23/20 1604)  Resp: 17 (10/23/20 1604)  BP: (!) 141/68 (10/23/20 1604)  SpO2: 95 % (10/23/20 1604) Vital Signs (24h Range):  Temp:  [97.3 °F (36.3 °C)-98.4 °F (36.9 °C)] 98.2 °F (36.8 °C)  Pulse:  [] 61  Resp:  [14-18] 17  SpO2:  [94 %-96 %] 95 %  BP: (104-171)/(57-80) 141/68     Weight: 63 kg (138 lb 14.2 oz)  Body mass index is 18.84 kg/m².    Physical Exam  Vitals signs and nursing note reviewed.   Constitutional:       Appearance: Normal appearance. He is well-developed.   HENT:      Head: Normocephalic and atraumatic.      Mouth/Throat:      Mouth: Mucous membranes are moist.   Eyes:      Pupils: Pupils are equal, round, and reactive to light.   Neck:      Musculoskeletal: Neck supple.   Cardiovascular:      Rate and Rhythm: Normal rate and regular rhythm.      Heart sounds: Normal heart sounds. No murmur. No friction rub. No gallop.    Pulmonary:      Effort: Pulmonary effort is normal.      Breath sounds: Normal breath sounds.   Chest:      Chest wall: No tenderness.   Abdominal:      General: There is no distension.      Tenderness: There is no abdominal tenderness.   Musculoskeletal: Normal range of motion.   Skin:     General: Skin is warm.      Capillary Refill: Capillary refill takes less than 2 seconds.   Neurological:      Mental Status: He is alert and oriented to person, place, and time.   Psychiatric:         Mood and Affect: Mood normal.           CRANIAL NERVES     CN III, IV, VI   Pupils are equal, round, and reactive to light.       Significant Labs:   ABGs: No results for input(s): PH, PCO2, HCO3, POCSATURATED, BE, TOTALHB, COHB, METHB, O2HB, POCFIO2 in the last 48 hours.  CBC:   Recent Labs   Lab 10/22/20  0214 10/23/20  0357   WBC 9.38  9.38 5.72   HGB 13.1*   13.1* 13.5*   HCT 40.0  40.0 41.5   *  118* 125*     CMP:   Recent Labs   Lab 10/22/20  0214 10/23/20  0357     140 139  139   K 4.1  4.1 4.0  4.0     107 106  106   CO2 26  26 25  25   GLU 92  92 72  72   BUN 26*  26* 21  21   CREATININE 1.3  1.3 1.2  1.2   CALCIUM 8.5*  8.5* 8.4*  8.4*   PROT 6.7 6.7   ALBUMIN 3.0* 3.0*   BILITOT 0.7 0.5   ALKPHOS 73 73   AST 25 23   ALT 18 17   ANIONGAP 7*  7* 8  8   EGFRNONAA 48*  48* 53*  53*     Cardiac Markers: No results for input(s): CKMB, MYOGLOBIN, BNP, TROPISTAT in the last 48 hours.  Coagulation:   Recent Labs   Lab 10/22/20  0406   APTT 39.5*     Lactic Acid: No results for input(s): LACTATE in the last 48 hours.  Lipid Panel: No results for input(s): CHOL, HDL, LDLCALC, TRIG, CHOLHDL in the last 48 hours.  Magnesium: No results for input(s): MG in the last 48 hours.  POCT Glucose: No results for input(s): POCTGLUCOSE in the last 48 hours.  Prealbumin: No results for input(s): PREALBUMIN in the last 48 hours.  TSH:   Recent Labs   Lab 10/21/20  1029   TSH 0.550     Urine Culture: No results for input(s): LABURIN in the last 48 hours.  Urine Studies: No results for input(s): COLORU, APPEARANCEUA, PHUR, SPECGRAV, PROTEINUA, GLUCUA, KETONESU, BILIRUBINUA, OCCULTUA, NITRITE, UROBILINOGEN, LEUKOCYTESUR, RBCUA, WBCUA, BACTERIA, SQUAMEPITHEL, HYALINECASTS in the last 48 hours.    Invalid input(s): WRIGHTSUR    Significant Imaging: I have reviewed all pertinent imaging results/findings within the past 24 hours.

## 2020-10-23 NOTE — PLAN OF CARE
Patient not medically ready- HR too high    Sent updated clinicals to [facility]       10/23/20 1046   Discharge Reassessment   Assessment Type Discharge Planning Reassessment   Provided patient/caregiver education on the expected discharge date and the discharge plan Yes   Do you have any problems affording any of your prescribed medications? Yes   Discharge Plan A Return to nursing home     Avis Pena RN, CCM, CMSRN  RN Transition Navigator  [phone]

## 2020-10-23 NOTE — PROGRESS NOTES
[facility]    CC: SVT     HPI 89 y.o. male with PMH of SVT, advanced dementia, BPH, HTN who presents to ED has his nursing home detected elevated HR. In ED EKG with evidence of SVT heart rate of 165 without ST or T wave abnormalities. He denied any symptoms on admission and had no hx of cardiac ischemia. Per chart review patient with spontaneous return to NSR. Troponin 1.63 on admission. Patient was admitted for SVT and troponin and started on heparin gtt. Febrile on admission. Cardiology consulted for NSTEMI and SVT.      10/23: Overnight with 4min run of SVT per tele. Vitals otherwise stable. He was unaware with conversion to NSR spontaneously.      A comprehensive review of records was preformed    Past Medical History:   Diagnosis Date    Benign essential hypertension     Borderline glaucoma, open angle with borderline findings 10/19/2012    Constipation 5/23/2014    Erectile dysfunction     GERD (gastroesophageal reflux disease)     History of GI bleed 12/12/2016    Hyperlipidemia     Iron deficiency anemia 12/13/2016    Nuclear sclerosis 10/19/2012         Past Surgical History:   Procedure Laterality Date    CATARACT EXTRACTION W/  INTRAOCULAR LENS IMPLANT  12/5/12    OD w/     COLONOSCOPY N/A 10/31/2017    Procedure: COLONOSCOPY;  Surgeon: Oswaldo Zee MD;  [facility];  Service: Endoscopy;  Laterality: N/A;    lump in back removed  2009    lump removal      head    PARS PLANA VITRECTOMY  12/11/12    OD w/ dr. tavarez    RETINAL DETACHMENT SURGERY  3/26/13    Right eye    TONSILLECTOMY         Social History  Social History     Tobacco Use    Smoking status: Former Smoker    Smokeless tobacco: Never Used   Substance Use Topics    Alcohol use: Yes     Alcohol/week: 0.8 standard drinks     Types: 1 Standard drinks or equivalent per week     Comment: soically    Drug use: No         Family History   Problem Relation Age of Onset    Cancer Mother     Diabetes  Mother     Hypertension Father     Stroke Father     Cancer Sister     Cancer Brother     Cataracts Paternal Grandmother     Glaucoma Paternal Grandmother     Hypertension Paternal Grandmother     Amblyopia Neg Hx     Blindness Neg Hx     Macular degeneration Neg Hx     Retinal detachment Neg Hx     Strabismus Neg Hx     Thyroid disease Neg Hx        Review of Systems  General ROS: negative for chills, fever or weight loss  ENT ROS: negative for epistaxis, sore throat or rhinorrhea  Respiratory ROS: no cough, shortness of breath, or wheezing  Cardiovascular ROS: no chest pain or dyspnea on exertion  Gastrointestinal ROS: no abdominal pain  Genito-Urinary ROS: no dysuria, trouble voiding, or hematuria  Musculoskeletal ROS: negative for gait disturbance or muscular weakness  Neurological ROS: no syncope or seizures; no ataxia  Dermatological ROS: negative for pruritis, rash and jaundice    Physical Examination  BP (!) 104/57   Pulse 80   Temp 97.9 °F (36.6 °C)   Resp 17   Ht 6' (1.829 m)   Wt 63 kg (138 lb 14.2 oz)   SpO2 95%   BMI 18.84 kg/m²   General appearance: alert, cooperative, no distress, elderly  HENT: Normocephalic, atraumatic, neck symmetrical, no nasal discharge   Eyes: conjunctivae/corneas clear, PERRL, EOM's intact  Lungs: clear to auscultation bilaterally, no dullness to percussion bilaterally  Heart: regular rate and rhythm without rub; no displacement of the PMI   Abdomen: soft, non-tender; bowel sounds normoactive; no organomegaly  Extremities: extremities symmetric; no clubbing, cyanosis, or edema  Integument: Skin color, texture, turgor normal; no rashes; hair distrubution normal  Neurologic: Alert and oriented only to self, normal strength  cognition     Labs:  Recent Results (from the past 336 hour(s))   Basic Metabolic Panel (BMP)    Collection Time: 10/23/20  3:57 AM   Result Value Ref Range    Sodium 139 136 - 145 mmol/L    Potassium 4.0 3.5 - 5.1 mmol/L    Chloride 106 95  - 110 mmol/L    CO2 25 23 - 29 mmol/L    BUN, Bld 21 8 - 23 mg/dL    Creatinine 1.2 0.5 - 1.4 mg/dL    Calcium 8.4 (L) 8.7 - 10.5 mg/dL    Anion Gap 8 8 - 16 mmol/L   Basic Metabolic Panel (BMP)    Collection Time: 10/22/20  2:14 AM   Result Value Ref Range    Sodium 140 136 - 145 mmol/L    Potassium 4.1 3.5 - 5.1 mmol/L    Chloride 107 95 - 110 mmol/L    CO2 26 23 - 29 mmol/L    BUN, Bld 26 (H) 8 - 23 mg/dL    Creatinine 1.3 0.5 - 1.4 mg/dL    Calcium 8.5 (L) 8.7 - 10.5 mg/dL    Anion Gap 7 (L) 8 - 16 mmol/L     Recent Results (from the past 336 hour(s))   CBC with Automated Differential    Collection Time: 10/23/20  3:57 AM   Result Value Ref Range    WBC 5.72 3.90 - 12.70 K/uL    Hemoglobin 13.5 (L) 14.0 - 18.0 g/dL    Hematocrit 41.5 40.0 - 54.0 %    Platelets 125 (L) 150 - 350 K/uL   CBC with Automated Differential    Collection Time: 10/22/20  2:14 AM   Result Value Ref Range    WBC 9.38 3.90 - 12.70 K/uL    Hemoglobin 13.1 (L) 14.0 - 18.0 g/dL    Hematocrit 40.0 40.0 - 54.0 %    Platelets 118 (L) 150 - 350 K/uL   CBC auto differential    Collection Time: 10/22/20  2:14 AM   Result Value Ref Range    WBC 9.38 3.90 - 12.70 K/uL    Hemoglobin 13.1 (L) 14.0 - 18.0 g/dL    Hematocrit 40.0 40.0 - 54.0 %    Platelets 118 (L) 150 - 350 K/uL     Recent Labs   Lab 10/21/20  2014   TROPONINI 1.636*       Imaging:  Imaging Results          X-Ray Chest AP Portable (Final result)  Result time 10/21/20 11:02:53    Final result by Genoveva Schrader MD (10/21/20 11:02:53)                 Impression:      As above      Electronically signed by: Genoveva Schrader MD  Date:    10/21/2020  Time:    11:02             Narrative:    EXAMINATION:  XR CHEST AP PORTABLE    CLINICAL HISTORY:  Supraventricular tachycardia    TECHNIQUE:  Single frontal view of the chest was performed.    COMPARISON:  Most recent prior, September 16, 2020 and more remote imaging.  Chest CT June 2019    FINDINGS:  There is overall a diminished inspiration.   The cardiac silhouette is not enlarged.  Calcification is present along the wall of the aorta.  There is no pleural effusion.  Osseous structures show degenerative changes.  Soft tissue prominence of the upper mediastinum correlates to tortuous vascular structures.  Lung bases demonstrate mild prominence of interstitial markings with some mild superimposed airspace opacity on the left, airspace opacity representing a change compared to prior imaging.                              ECHO 10/21/20:  · There is left ventricular concentric remodeling.  · LV not well visualized. LVEF liekly 40-50%. Recommend an alternative mode to more accurately assess for LVEF  · Indeterminate diastolic function.  · Normal right ventricular systolic function.  · The mitral valve is mildly sclerotic.  · Mild aortic valve stenosis.  · Aortic valve area is 1.78 cm2; peak velocity is 2.16 m/s; mean gradient is 13 mmHg.  · Mild to moderate tricuspid regurgitation.  · There is a large left pleural effusion.    EKG: 10/21/20  SVT with   spontaneous conversion to NSR    Assessment:   89 male with PHM of SVT, advanced dementia, BPH, HTN who presented with episode of SVT for unknown duration as he was unaware. NSTEMI with Troponin 1.63 on admission with spontaneous conversion to sinus. Troponin trend 1.63--> 1.60--> 1.4 without new EKG changes. ECHO with preserved EF and no wall motion abnormalities. Still aortic stenosis as compared to previous echo.   Overnight with 4min run of SVT per tele. Vitals otherwise stable. He was unaware with conversion to NSR spontaneously.      Plan:   Can increase lopressor to 25mg PO BID  Discontinue home cardizem  Continue ASA and statin        No further recs. Please contact with further questions.      Joana Townsend DO   [facility] PGY-4  10/23/2020 9:38 AM

## 2020-10-23 NOTE — PLAN OF CARE
VN rounds completed.  The patient has no complaints at this time and states that he did have a bowel movement today.  IV sights were not visible due to [place]. Will continue to monitor.

## 2020-10-23 NOTE — ASSESSMENT & PLAN NOTE
SVT (supraventricular tachycardia)    Patient admitted with SVT and converted to sinus. Elevated troponin likely related to demand ishemia.  Will follow cardiology's recs for medical management with beta blocker.     --Cont tele  --We appreciate [facility]  ---Started Heparin gtt now stopped  --Loaded with ASA in ED  --added BB

## 2020-10-24 LAB
ALBUMIN SERPL BCP-MCNC: 3 G/DL (ref 3.5–5.2)
ALP SERPL-CCNC: 73 U/L (ref 55–135)
ALT SERPL W/O P-5'-P-CCNC: 15 U/L (ref 10–44)
ANION GAP SERPL CALC-SCNC: 8 MMOL/L (ref 8–16)
ANION GAP SERPL CALC-SCNC: 8 MMOL/L (ref 8–16)
AST SERPL-CCNC: 19 U/L (ref 10–40)
BASOPHILS # BLD AUTO: 0.04 K/UL (ref 0–0.2)
BASOPHILS NFR BLD: 0.6 % (ref 0–1.9)
BILIRUB SERPL-MCNC: 0.7 MG/DL (ref 0.1–1)
BUN SERPL-MCNC: 21 MG/DL (ref 8–23)
BUN SERPL-MCNC: 21 MG/DL (ref 8–23)
CALCIUM SERPL-MCNC: 8.7 MG/DL (ref 8.7–10.5)
CALCIUM SERPL-MCNC: 8.7 MG/DL (ref 8.7–10.5)
CHLORIDE SERPL-SCNC: 106 MMOL/L (ref 95–110)
CHLORIDE SERPL-SCNC: 106 MMOL/L (ref 95–110)
CO2 SERPL-SCNC: 26 MMOL/L (ref 23–29)
CO2 SERPL-SCNC: 26 MMOL/L (ref 23–29)
CREAT SERPL-MCNC: 1.3 MG/DL (ref 0.5–1.4)
CREAT SERPL-MCNC: 1.3 MG/DL (ref 0.5–1.4)
DIFFERENTIAL METHOD: ABNORMAL
EOSINOPHIL # BLD AUTO: 0.2 K/UL (ref 0–0.5)
EOSINOPHIL NFR BLD: 2.9 % (ref 0–8)
ERYTHROCYTE [DISTWIDTH] IN BLOOD BY AUTOMATED COUNT: 14.6 % (ref 11.5–14.5)
EST. GFR  (AFRICAN AMERICAN): 56 ML/MIN/1.73 M^2
EST. GFR  (AFRICAN AMERICAN): 56 ML/MIN/1.73 M^2
EST. GFR  (NON AFRICAN AMERICAN): 48 ML/MIN/1.73 M^2
EST. GFR  (NON AFRICAN AMERICAN): 48 ML/MIN/1.73 M^2
GLUCOSE SERPL-MCNC: 78 MG/DL (ref 70–110)
GLUCOSE SERPL-MCNC: 78 MG/DL (ref 70–110)
HCT VFR BLD AUTO: 42.8 % (ref 40–54)
HGB BLD-MCNC: 13.9 G/DL (ref 14–18)
IMM GRANULOCYTES # BLD AUTO: 0.02 K/UL (ref 0–0.04)
IMM GRANULOCYTES NFR BLD AUTO: 0.3 % (ref 0–0.5)
LYMPHOCYTES # BLD AUTO: 0.9 K/UL (ref 1–4.8)
LYMPHOCYTES NFR BLD: 14.2 % (ref 18–48)
MCH RBC QN AUTO: 28.5 PG (ref 27–31)
MCHC RBC AUTO-ENTMCNC: 32.5 G/DL (ref 32–36)
MCV RBC AUTO: 88 FL (ref 82–98)
MONOCYTES # BLD AUTO: 0.6 K/UL (ref 0.3–1)
MONOCYTES NFR BLD: 8.8 % (ref 4–15)
NEUTROPHILS # BLD AUTO: 4.6 K/UL (ref 1.8–7.7)
NEUTROPHILS NFR BLD: 73.2 % (ref 38–73)
NRBC BLD-RTO: 0 /100 WBC
PLATELET # BLD AUTO: 125 K/UL (ref 150–350)
PMV BLD AUTO: 11.5 FL (ref 9.2–12.9)
POTASSIUM SERPL-SCNC: 4.1 MMOL/L (ref 3.5–5.1)
POTASSIUM SERPL-SCNC: 4.1 MMOL/L (ref 3.5–5.1)
PROT SERPL-MCNC: 6.7 G/DL (ref 6–8.4)
RBC # BLD AUTO: 4.88 M/UL (ref 4.6–6.2)
SODIUM SERPL-SCNC: 140 MMOL/L (ref 136–145)
SODIUM SERPL-SCNC: 140 MMOL/L (ref 136–145)
WBC # BLD AUTO: 6.28 K/UL (ref 3.9–12.7)

## 2020-10-24 PROCEDURE — 85025 COMPLETE CBC W/AUTO DIFF WBC: CPT

## 2020-10-24 PROCEDURE — 80053 COMPREHEN METABOLIC PANEL: CPT

## 2020-10-24 PROCEDURE — 94761 N-INVAS EAR/PLS OXIMETRY MLT: CPT

## 2020-10-24 PROCEDURE — 36415 COLL VENOUS BLD VENIPUNCTURE: CPT

## 2020-10-24 PROCEDURE — 25000003 PHARM REV CODE 250: Performed by: FAMILY MEDICINE

## 2020-10-24 PROCEDURE — 11000001 HC ACUTE MED/SURG PRIVATE ROOM

## 2020-10-24 PROCEDURE — 25000003 PHARM REV CODE 250: Performed by: NURSE PRACTITIONER

## 2020-10-24 RX ADMIN — DOCUSATE SODIUM 100 MG: 100 CAPSULE, LIQUID FILLED ORAL at 08:10

## 2020-10-24 RX ADMIN — METOPROLOL TARTRATE 12.5 MG: 25 TABLET, FILM COATED ORAL at 09:10

## 2020-10-24 RX ADMIN — DOCUSATE SODIUM 100 MG: 100 CAPSULE, LIQUID FILLED ORAL at 09:10

## 2020-10-24 RX ADMIN — PRAVASTATIN SODIUM 80 MG: 40 TABLET ORAL at 09:10

## 2020-10-24 RX ADMIN — METOPROLOL TARTRATE 12.5 MG: 25 TABLET, FILM COATED ORAL at 08:10

## 2020-10-24 RX ADMIN — ASPIRIN 81 MG 81 MG: 81 TABLET ORAL at 09:10

## 2020-10-24 RX ADMIN — TAMSULOSIN HYDROCHLORIDE 0.4 MG: 0.4 CAPSULE ORAL at 08:10

## 2020-10-24 RX ADMIN — PANTOPRAZOLE SODIUM 40 MG: 40 TABLET, DELAYED RELEASE ORAL at 09:10

## 2020-10-24 RX ADMIN — DONEPEZIL HYDROCHLORIDE 5 MG: 5 TABLET, FILM COATED ORAL at 08:10

## 2020-10-24 RX ADMIN — FINASTERIDE 5 MG: 5 TABLET, FILM COATED ORAL at 09:10

## 2020-10-24 RX ADMIN — QUETIAPINE FUMARATE 25 MG: 25 TABLET ORAL at 08:10

## 2020-10-24 NOTE — SUBJECTIVE & OBJECTIVE
Interval History: No distress noted--will follow. Very sweet little man.     Review of Systems   Unable to perform ROS: Dementia     Objective:     Vital Signs (Most Recent):  Temp: 97.8 °F (36.6 °C) (10/24/20 1258)  Pulse: 63 (10/24/20 1258)  Resp: 18 (10/24/20 1258)  BP: (!) 116/57 (10/24/20 1258)  SpO2: 97 % (10/24/20 1258) Vital Signs (24h Range):  Temp:  [96.3 °F (35.7 °C)-98.3 °F (36.8 °C)] 97.8 °F (36.6 °C)  Pulse:  [56-85] 63  Resp:  [16-20] 18  SpO2:  [94 %-97 %] 97 %  BP: (116-162)/(57-90) 116/57     Weight: 63 kg (138 lb 14.2 oz)  Body mass index is 18.84 kg/m².    Physical Exam  Vitals signs and nursing note reviewed.   Constitutional:       Appearance: Normal appearance. He is well-developed.   HENT:      Head: Normocephalic and atraumatic.      Mouth/Throat:      Mouth: Mucous membranes are moist.   Eyes:      Pupils: Pupils are equal, round, and reactive to light.   Neck:      Musculoskeletal: Neck supple.   Cardiovascular:      Rate and Rhythm: Normal rate and regular rhythm.      Heart sounds: Normal heart sounds. No murmur. No friction rub. No gallop.    Pulmonary:      Effort: Pulmonary effort is normal.      Breath sounds: Normal breath sounds.   Chest:      Chest wall: No tenderness.   Abdominal:      General: There is no distension.      Tenderness: There is no abdominal tenderness.   Musculoskeletal: Normal range of motion.   Skin:     General: Skin is warm.      Capillary Refill: Capillary refill takes less than 2 seconds.   Neurological:      Mental Status: He is alert and oriented to person, place, and time.   Psychiatric:         Mood and Affect: Mood normal.           CRANIAL NERVES     CN III, IV, VI   Pupils are equal, round, and reactive to light.       Significant Labs:   ABGs: No results for input(s): PH, PCO2, HCO3, POCSATURATED, BE, TOTALHB, COHB, METHB, O2HB, POCFIO2 in the last 48 hours.  CBC:   Recent Labs   Lab 10/23/20  0357 10/24/20  0424   WBC 5.72 6.28   HGB 13.5* 13.9*    HCT 41.5 42.8   * 125*     CMP:   Recent Labs   Lab 10/23/20  0357 10/24/20  0424     139 140  140   K 4.0  4.0 4.1  4.1     106 106  106   CO2 25  25 26  26   GLU 72  72 78  78   BUN 21  21 21  21   CREATININE 1.2  1.2 1.3  1.3   CALCIUM 8.4*  8.4* 8.7  8.7   PROT 6.7 6.7   ALBUMIN 3.0* 3.0*   BILITOT 0.5 0.7   ALKPHOS 73 73   AST 23 19   ALT 17 15   ANIONGAP 8  8 8  8   EGFRNONAA 53*  53* 48*  48*     Cardiac Markers: No results for input(s): CKMB, MYOGLOBIN, BNP, TROPISTAT in the last 48 hours.  Coagulation:   No results for input(s): PT, INR, APTT in the last 48 hours.  Lactic Acid: No results for input(s): LACTATE in the last 48 hours.  Lipid Panel: No results for input(s): CHOL, HDL, LDLCALC, TRIG, CHOLHDL in the last 48 hours.  Magnesium: No results for input(s): MG in the last 48 hours.  POCT Glucose: No results for input(s): POCTGLUCOSE in the last 48 hours.  Prealbumin: No results for input(s): PREALBUMIN in the last 48 hours.  TSH:   Recent Labs   Lab 10/21/20  1029   TSH 0.550     Urine Culture: No results for input(s): LABURIN in the last 48 hours.  Urine Studies: No results for input(s): COLORU, APPEARANCEUA, PHUR, SPECGRAV, PROTEINUA, GLUCUA, KETONESU, BILIRUBINUA, OCCULTUA, NITRITE, UROBILINOGEN, LEUKOCYTESUR, RBCUA, WBCUA, BACTERIA, SQUAMEPITHEL, HYALINECASTS in the last 48 hours.    Invalid input(s): XINSUR    Significant Imaging: I have reviewed all pertinent imaging results/findings within the past 24 hours.

## 2020-10-24 NOTE — PROGRESS NOTES
[facility] Hospital Medicine  Progress Note    Patient Name: Toby Green  MRN: [id number]  Patient Class: IP- Inpatient   Admission Date: 10/21/2020  Length of Stay: 3 days  Attending Physician: Urszula Barros*  Primary Care Provider: Lindsey Du MD        Subjective:     Principal Problem:NSTEMI (non-ST elevated myocardial infarction)        HPI:  Toby Green is a 89 y/o with a pmh of Benign essential hypertension, Borderline glaucoma, open angle with borderline findings (10/19/2012), Constipation (5/23/2014), Erectile dysfunction, GERD (gastroesophageal reflux disease), History of GI bleed (12/12/2016), Hyperlipidemia, Iron deficiency anemia (12/13/2016), and Nuclear sclerosis (10/19/2012). He also has a past surgical history of tonsillectomy, retinal detachment, pars plana vitrectomy, colonoscopy, and cataract implants. He does not drink alcohol, use illicit drugs, or smoke cigarettes. His PCP is Dr. Lindsey Du. He presents to [facility] ED via ambulance with increased heart rate by nursing home staff. Patient is confused, unable to obtain assessment. No complaints of pain currently. ED workup noted for hemoglobin 13.6, BUN--31, creatinine--1.6, calcium--8.6, elevated troponin, negative COVID. CXR-- There is overall a diminished inspiration.  The cardiac silhouette is not enlarged.  Calcification is present along the wall of the aorta.  There is no pleural effusion.  Osseous structures show degenerative changes.  Soft tissue prominence of the upper mediastinum correlates to tortuous vascular structures.  Lung bases demonstrate mild prominence of interstitial markings with some mild superimposed airspace opacity on the left, airspace opacity representing a change compared to prior imaging. He was admitted to the [facility] medicine department for further care.     Overview/Hospital Course:  The patient was admitted to the [facility]  medicine department for further care. [facility]ology was consulted. Starting low dose BB. Considering increasing BB 2/2 runs SVT.     Interval History: No distress noted--will follow. Very sweet little man.     Review of Systems   Unable to perform ROS: Dementia     Objective:     Vital Signs (Most Recent):  Temp: 97.8 °F (36.6 °C) (10/24/20 1258)  Pulse: 63 (10/24/20 1258)  Resp: 18 (10/24/20 1258)  BP: (!) 116/57 (10/24/20 1258)  SpO2: 97 % (10/24/20 1258) Vital Signs (24h Range):  Temp:  [96.3 °F (35.7 °C)-98.3 °F (36.8 °C)] 97.8 °F (36.6 °C)  Pulse:  [56-85] 63  Resp:  [16-20] 18  SpO2:  [94 %-97 %] 97 %  BP: (116-162)/(57-90) 116/57     Weight: 63 kg (138 lb 14.2 oz)  Body mass index is 18.84 kg/m².    Physical Exam  Vitals signs and nursing note reviewed.   Constitutional:       Appearance: Normal appearance. He is well-developed.   HENT:      Head: Normocephalic and atraumatic.      Mouth/Throat:      Mouth: Mucous membranes are moist.   Eyes:      Pupils: Pupils are equal, round, and reactive to light.   Neck:      Musculoskeletal: Neck supple.   Cardiovascular:      Rate and Rhythm: Normal rate and regular rhythm.      Heart sounds: Normal heart sounds. No murmur. No friction rub. No gallop.    Pulmonary:      Effort: Pulmonary effort is normal.      Breath sounds: Normal breath sounds.   Chest:      Chest wall: No tenderness.   Abdominal:      General: There is no distension.      Tenderness: There is no abdominal tenderness.   Musculoskeletal: Normal range of motion.   Skin:     General: Skin is warm.      Capillary Refill: Capillary refill takes less than 2 seconds.   Neurological:      Mental Status: He is alert and oriented to person, place, and time.   Psychiatric:         Mood and Affect: Mood normal.           CRANIAL NERVES     CN III, IV, VI   Pupils are equal, round, and reactive to light.       Significant Labs:   ABGs: No results for input(s): PH, PCO2, HCO3, POCSATURATED, BE, TOTALHB, COHB,  METHB, O2HB, POCFIO2 in the last 48 hours.  CBC:   Recent Labs   Lab 10/23/20  0357 10/24/20  0424   WBC 5.72 6.28   HGB 13.5* 13.9*   HCT 41.5 42.8   * 125*     CMP:   Recent Labs   Lab 10/23/20  0357 10/24/20  0424     139 140  140   K 4.0  4.0 4.1  4.1     106 106  106   CO2 25  25 26  26   GLU 72  72 78  78   BUN 21  21 21  21   CREATININE 1.2  1.2 1.3  1.3   CALCIUM 8.4*  8.4* 8.7  8.7   PROT 6.7 6.7   ALBUMIN 3.0* 3.0*   BILITOT 0.5 0.7   ALKPHOS 73 73   AST 23 19   ALT 17 15   ANIONGAP 8  8 8  8   EGFRNONAA 53*  53* 48*  48*     Cardiac Markers: No results for input(s): CKMB, MYOGLOBIN, BNP, TROPISTAT in the last 48 hours.  Coagulation:   No results for input(s): PT, INR, APTT in the last 48 hours.  Lactic Acid: No results for input(s): LACTATE in the last 48 hours.  Lipid Panel: No results for input(s): CHOL, HDL, LDLCALC, TRIG, CHOLHDL in the last 48 hours.  Magnesium: No results for input(s): MG in the last 48 hours.  POCT Glucose: No results for input(s): POCTGLUCOSE in the last 48 hours.  Prealbumin: No results for input(s): PREALBUMIN in the last 48 hours.  TSH:   Recent Labs   Lab 10/21/20  1029   TSH 0.550     Urine Culture: No results for input(s): LABURIN in the last 48 hours.  Urine Studies: No results for input(s): COLORU, APPEARANCEUA, PHUR, SPECGRAV, PROTEINUA, GLUCUA, KETONESU, BILIRUBINUA, OCCULTUA, NITRITE, UROBILINOGEN, LEUKOCYTESUR, RBCUA, WBCUA, BACTERIA, SQUAMEPITHEL, HYALINECASTS in the last 48 hours.    Invalid input(s): WRIGHTSUR    Significant Imaging: I have reviewed all pertinent imaging results/findings within the past 24 hours.      Assessment/Plan:      * NSTEMI (non-ST elevated myocardial infarction)  SVT (supraventricular tachycardia)    Patient admitted with SVT and converted to sinus. Elevated troponin likely related to demand ishemia.  Will follow cardiology's recs for medical management with beta blocker.     --Cont tele  --We  appreciate [facility]  ---Started Heparin gtt now stopped  --Loaded with ASA in ED  --added BB        PRESLEY (acute kidney injury)  Creatinine has improved---1.3 today.      Late onset Alzheimer's disease with behavioral disturbance  Continue seroquel      Constipation  Continue docusate      Benign prostatic hyperplasia without lower urinary tract symptoms  Continue Flomax and Fenastrided      GERD (gastroesophageal reflux disease)  PPI      Hyperlipidemia  Continue Pravachol        VTE Risk Mitigation (From admission, onward)         Ordered     IP VTE HIGH RISK PATIENT  Once      10/21/20 1615     Place sequential compression device  Until discontinued      10/21/20 1615                Discharge Planning   ERNIE:      Code Status: Full Code   Is the patient medically ready for discharge?:     Reason for patient still in hospital (select all that apply): Patient trending condition  Discharge Plan A: Return to nursing home                  Sanchez Aguirre NP  Department of Hospital Medicine   [facility]

## 2020-10-25 LAB
ALBUMIN SERPL BCP-MCNC: 3 G/DL (ref 3.5–5.2)
ALP SERPL-CCNC: 72 U/L (ref 55–135)
ALT SERPL W/O P-5'-P-CCNC: 15 U/L (ref 10–44)
ANION GAP SERPL CALC-SCNC: 7 MMOL/L (ref 8–16)
ANION GAP SERPL CALC-SCNC: 7 MMOL/L (ref 8–16)
AST SERPL-CCNC: 19 U/L (ref 10–40)
BASOPHILS # BLD AUTO: 0.04 K/UL (ref 0–0.2)
BASOPHILS NFR BLD: 0.8 % (ref 0–1.9)
BILIRUB SERPL-MCNC: 0.4 MG/DL (ref 0.1–1)
BUN SERPL-MCNC: 23 MG/DL (ref 8–23)
BUN SERPL-MCNC: 23 MG/DL (ref 8–23)
CALCIUM SERPL-MCNC: 8.8 MG/DL (ref 8.7–10.5)
CALCIUM SERPL-MCNC: 8.8 MG/DL (ref 8.7–10.5)
CHLORIDE SERPL-SCNC: 105 MMOL/L (ref 95–110)
CHLORIDE SERPL-SCNC: 105 MMOL/L (ref 95–110)
CO2 SERPL-SCNC: 27 MMOL/L (ref 23–29)
CO2 SERPL-SCNC: 27 MMOL/L (ref 23–29)
CREAT SERPL-MCNC: 1.5 MG/DL (ref 0.5–1.4)
CREAT SERPL-MCNC: 1.5 MG/DL (ref 0.5–1.4)
DIFFERENTIAL METHOD: ABNORMAL
EOSINOPHIL # BLD AUTO: 0.2 K/UL (ref 0–0.5)
EOSINOPHIL NFR BLD: 4.5 % (ref 0–8)
ERYTHROCYTE [DISTWIDTH] IN BLOOD BY AUTOMATED COUNT: 14.6 % (ref 11.5–14.5)
EST. GFR  (AFRICAN AMERICAN): 47 ML/MIN/1.73 M^2
EST. GFR  (AFRICAN AMERICAN): 47 ML/MIN/1.73 M^2
EST. GFR  (NON AFRICAN AMERICAN): 40 ML/MIN/1.73 M^2
EST. GFR  (NON AFRICAN AMERICAN): 40 ML/MIN/1.73 M^2
GLUCOSE SERPL-MCNC: 82 MG/DL (ref 70–110)
GLUCOSE SERPL-MCNC: 82 MG/DL (ref 70–110)
HCT VFR BLD AUTO: 42.2 % (ref 40–54)
HGB BLD-MCNC: 13.8 G/DL (ref 14–18)
IMM GRANULOCYTES # BLD AUTO: 0.01 K/UL (ref 0–0.04)
IMM GRANULOCYTES NFR BLD AUTO: 0.2 % (ref 0–0.5)
LYMPHOCYTES # BLD AUTO: 1.1 K/UL (ref 1–4.8)
LYMPHOCYTES NFR BLD: 22.2 % (ref 18–48)
MCH RBC QN AUTO: 28.7 PG (ref 27–31)
MCHC RBC AUTO-ENTMCNC: 32.7 G/DL (ref 32–36)
MCV RBC AUTO: 88 FL (ref 82–98)
MONOCYTES # BLD AUTO: 0.5 K/UL (ref 0.3–1)
MONOCYTES NFR BLD: 9.4 % (ref 4–15)
NEUTROPHILS # BLD AUTO: 3.2 K/UL (ref 1.8–7.7)
NEUTROPHILS NFR BLD: 62.9 % (ref 38–73)
NRBC BLD-RTO: 0 /100 WBC
PLATELET # BLD AUTO: 135 K/UL (ref 150–350)
PMV BLD AUTO: 12 FL (ref 9.2–12.9)
POTASSIUM SERPL-SCNC: 4.2 MMOL/L (ref 3.5–5.1)
POTASSIUM SERPL-SCNC: 4.2 MMOL/L (ref 3.5–5.1)
PROT SERPL-MCNC: 6.8 G/DL (ref 6–8.4)
RBC # BLD AUTO: 4.81 M/UL (ref 4.6–6.2)
SODIUM SERPL-SCNC: 139 MMOL/L (ref 136–145)
SODIUM SERPL-SCNC: 139 MMOL/L (ref 136–145)
WBC # BLD AUTO: 5.13 K/UL (ref 3.9–12.7)

## 2020-10-25 PROCEDURE — 25000003 PHARM REV CODE 250: Performed by: FAMILY MEDICINE

## 2020-10-25 PROCEDURE — 25000003 PHARM REV CODE 250: Performed by: NURSE PRACTITIONER

## 2020-10-25 PROCEDURE — 36415 COLL VENOUS BLD VENIPUNCTURE: CPT

## 2020-10-25 PROCEDURE — 94761 N-INVAS EAR/PLS OXIMETRY MLT: CPT

## 2020-10-25 PROCEDURE — 85025 COMPLETE CBC W/AUTO DIFF WBC: CPT

## 2020-10-25 PROCEDURE — 80053 COMPREHEN METABOLIC PANEL: CPT

## 2020-10-25 PROCEDURE — 11000001 HC ACUTE MED/SURG PRIVATE ROOM

## 2020-10-25 RX ORDER — LACTOSE-REDUCED FOOD
240 LIQUID (ML) ORAL 2 TIMES DAILY
COMMUNITY

## 2020-10-25 RX ORDER — DILTIAZEM HYDROCHLORIDE 240 MG/1
240 CAPSULE, COATED, EXTENDED RELEASE ORAL DAILY
Status: ON HOLD | COMMUNITY
End: 2020-10-26 | Stop reason: HOSPADM

## 2020-10-25 RX ADMIN — METOPROLOL TARTRATE 12.5 MG: 25 TABLET, FILM COATED ORAL at 09:10

## 2020-10-25 RX ADMIN — PRAVASTATIN SODIUM 80 MG: 40 TABLET ORAL at 09:10

## 2020-10-25 RX ADMIN — FINASTERIDE 5 MG: 5 TABLET, FILM COATED ORAL at 09:10

## 2020-10-25 RX ADMIN — PANTOPRAZOLE SODIUM 40 MG: 40 TABLET, DELAYED RELEASE ORAL at 09:10

## 2020-10-25 RX ADMIN — QUETIAPINE FUMARATE 25 MG: 25 TABLET ORAL at 09:10

## 2020-10-25 RX ADMIN — DOCUSATE SODIUM 100 MG: 100 CAPSULE, LIQUID FILLED ORAL at 09:10

## 2020-10-25 RX ADMIN — DONEPEZIL HYDROCHLORIDE 5 MG: 5 TABLET, FILM COATED ORAL at 09:10

## 2020-10-25 RX ADMIN — TAMSULOSIN HYDROCHLORIDE 0.4 MG: 0.4 CAPSULE ORAL at 09:10

## 2020-10-25 RX ADMIN — ASPIRIN 81 MG 81 MG: 81 TABLET ORAL at 09:10

## 2020-10-25 NOTE — PLAN OF CARE
Problem: Adult Inpatient Plan of Care  Goal: Plan of Care Review  Description: Chart and Care plan reviewed.    Outcome: Ongoing, Progressing      Patient's chart, progress notes, and care plan reviewed. Will be available as needed.

## 2020-10-25 NOTE — PROGRESS NOTES
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 He also has a past surgical history of tonsillectomy, retinal detachment, pars plana vitrectomy, colonoscopy, and cataract implants. He does not drink alcohol, use illicit drugs, or smoke cigarettes. His PCP is Dr. Lindsey Du. He presents to [facility] ED via ambulance with increased heart rate by nursing home staff. Patient is confused, unable to obtain assessment. No complaints of pain currently. ED workup noted for hemoglobin 13.6, BUN--31, creatinine--1.6, calcium--8.6, elevated troponin, negative COVID. CXR-- There is overall a diminished inspiration.  The cardiac silhouette is not enlarged.  Calcification is present along the wall of the aorta.  There is no pleural effusion.  Osseous structures show degenerative changes.  Soft tissue prominence of the upper mediastinum correlates to tortuous vascular structures.  Lung bases demonstrate mild prominence of interstitial markings with some mild superimposed airspace opacity on the left, airspace opacity representing a change compared to prior imaging. He was admitted to the [facility] medicine department for further care.     Overview/Hospital Course:  The patient was admitted to the [facility]  medicine department for further care. [facility] cardiology was consulted. Starting low dose BB. Considering increasing BB 2/2 runs SVT.     Interval History: Sitting up on the side of the bed eating breakfast. [facility]     Review of Systems   Unable to perform ROS: Dementia     Objective:     Vital Signs (Most Recent):  Temp: 98.9 °F (37.2 °C) (10/25/20 0833)  Pulse: 84 (10/25/20 0833)  Resp: 17 (10/25/20 0833)  BP: (!) 152/93 (10/25/20 0833)  SpO2: 95 % (10/25/20 0805) Vital Signs (24h Range):  Temp:  [96.3 °F (35.7 °C)-98.9 °F (37.2 °C)] 98.9 °F (37.2 °C)  Pulse:  [56-84] 84  Resp:  [16-18] 17  SpO2:  [94 %-97 %] 95 %  BP: (116-161)/(57-93) 152/93     Weight: 63 kg (138 lb 14.2 oz)  Body mass index is 18.84 kg/m².    Physical Exam  Vitals signs and nursing note reviewed.   Constitutional:       Appearance: Normal appearance. He is well-developed.   HENT:      Head: Normocephalic and atraumatic.      Mouth/Throat:      Mouth: Mucous membranes are moist.   Eyes:      Pupils: Pupils are equal, round, and reactive to light.   Neck:      Musculoskeletal: Neck supple.   Cardiovascular:      Rate and Rhythm: Normal rate and regular rhythm.      Heart sounds: Normal heart sounds. No murmur. No friction rub. No gallop.    Pulmonary:      Effort: Pulmonary effort is normal.      Breath sounds: Normal breath sounds.   Chest:      Chest wall: No tenderness.   Abdominal:      General: There is no distension.      Palpations: Abdomen is soft.      Tenderness: There is no abdominal tenderness. There is no guarding.   Musculoskeletal: Normal range of motion.   Skin:     General: Skin is warm.      Capillary Refill: Capillary refill takes less than 2 seconds.   Neurological:      Mental Status: He is alert and oriented to person, place, and time.   Psychiatric:         Mood and Affect: Mood normal.           CRANIAL NERVES     CN III, IV, VI   Pupils are equal, round, and reactive to light.        Significant Labs:   ABGs: No results for input(s): PH, PCO2, HCO3, POCSATURATED, BE, TOTALHB, COHB, METHB, O2HB, POCFIO2 in the last 48 hours.  CBC:   Recent Labs   Lab 10/24/20  0424 10/25/20  0406   WBC 6.28 5.13   HGB 13.9* 13.8*   HCT 42.8 42.2   * 135*     CMP:   Recent Labs   Lab 10/24/20  0424 10/25/20  0406     140 139  139   K 4.1  4.1 4.2  4.2     106 105  105   CO2 26  26 27  27   GLU 78  78 82  82   BUN 21  21 23  23   CREATININE 1.3  1.3 1.5*  1.5*   CALCIUM 8.7  8.7 8.8  8.8   PROT 6.7 6.8   ALBUMIN 3.0* 3.0*   BILITOT 0.7 0.4   ALKPHOS 73 72   AST 19 19   ALT 15 15   ANIONGAP 8  8 7*  7*   EGFRNONAA 48*  48* 40*  40*     Cardiac Markers: No results for input(s): CKMB, MYOGLOBIN, BNP, TROPISTAT in the last 48 hours.  Coagulation:   No results for input(s): PT, INR, APTT in the last 48 hours.  Lactic Acid: No results for input(s): LACTATE in the last 48 hours.  Lipid Panel: No results for input(s): CHOL, HDL, LDLCALC, TRIG, CHOLHDL in the last 48 hours.  Magnesium: No results for input(s): MG in the last 48 hours.  POCT Glucose: No results for input(s): POCTGLUCOSE in the last 48 hours.  Prealbumin: No results for input(s): PREALBUMIN in the last 48 hours.  TSH:   Recent Labs   Lab 10/21/20  1029   TSH 0.550     Urine Culture: No results for input(s): LABURIN in the last 48 hours.  Urine Studies: No results for input(s): COLORU, APPEARANCEUA, PHUR, SPECGRAV, PROTEINUA, GLUCUA, KETONESU, BILIRUBINUA, OCCULTUA, NITRITE, UROBILINOGEN, LEUKOCYTESUR, RBCUA, WBCUA, BACTERIA, SQUAMEPITHEL, HYALINECASTS in the last 48 hours.    Invalid input(s): WRIGHTSUR    Significant Imaging: I have reviewed all pertinent imaging results/findings within the past 24 hours.      Assessment/Plan:      * NSTEMI (non-ST elevated myocardial infarction)  SVT (supraventricular tachycardia)    Patient admitted with SVT and converted to sinus. Elevated troponin likely related to demand  ishemia.  Will follow cardiology's recs for medical management with beta blocker.     --Cont tele  --We appreciate [facility] cardiology  ---Started Heparin gtt now stopped  --Loaded with ASA in ED  --added BB        PRESLEY (acute kidney injury)  Creatinine---1.5 today. Monitor.       Late onset Alzheimer's disease with behavioral disturbance  Continue seroquel      Constipation  Continue docusate      Benign prostatic hyperplasia without lower urinary tract symptoms  Continue Flomax and Fenastrided      GERD (gastroesophageal reflux disease)  PPI      Hyperlipidemia  Continue Pravachol        VTE Risk Mitigation (From admission, onward)         Ordered     IP VTE HIGH RISK PATIENT  Once      10/21/20 1615     Place sequential compression device  Until discontinued      10/21/20 1615                Discharge Planning   ERNIE:      Code Status: DNR   Is the patient medically ready for discharge?:     Reason for patient still in hospital (select all that apply): Patient trending condition  Discharge Plan A: Return to nursing home                  Sanchez Aguirre NP  Department of Hospital Medicine   [facility]

## 2020-10-25 NOTE — PLAN OF CARE
Problem: Adult Inpatient Plan of Care  Goal: Plan of Care Review  Outcome: Ongoing, Progressing    VIRTUAL NURSE:  Cued into patient's room.  Permission received per patient to turn camera to view patient.  Introduced as VN for night shift that will be working with floor nurse and nursing assistant.  Educated patient on VN's role in patient care and  VIP model.  Plan of care reviewed with patient.  Education per flowsheet.   Informed patient that staff will round on them every 2 hours but to use call light for any other needs they may have; informed of fall risk and fall precautions.  Patient verbalized understanding.  Call light within reach; bed siderails up x3.  Opportunity given for questions and questions answered.  Admission assessment questions completed.  Patient denies complaints or any needs at this time. Instructed to call for assistance.  Will cont to monitor and intervene as needed.    Labs, notes, orders, and careplan reviewed.

## 2020-10-25 NOTE — SUBJECTIVE & OBJECTIVE
Interval History: Sitting up on the side of the bed eating breakfast. [facility].     Review of Systems   Unable to perform ROS: Dementia     Objective:     Vital Signs (Most Recent):  Temp: 98.9 °F (37.2 °C) (10/25/20 0833)  Pulse: 84 (10/25/20 0833)  Resp: 17 (10/25/20 0833)  BP: (!) 152/93 (10/25/20 0833)  SpO2: 95 % (10/25/20 0805) Vital Signs (24h Range):  Temp:  [96.3 °F (35.7 °C)-98.9 °F (37.2 °C)] 98.9 °F (37.2 °C)  Pulse:  [56-84] 84  Resp:  [16-18] 17  SpO2:  [94 %-97 %] 95 %  BP: (116-161)/(57-93) 152/93     Weight: 63 kg (138 lb 14.2 oz)  Body mass index is 18.84 kg/m².    Physical Exam  Vitals signs and nursing note reviewed.   Constitutional:       Appearance: Normal appearance. He is well-developed.   HENT:      Head: Normocephalic and atraumatic.      Mouth/Throat:      Mouth: Mucous membranes are moist.   Eyes:      Pupils: Pupils are equal, round, and reactive to light.   Neck:      Musculoskeletal: Neck supple.   Cardiovascular:      Rate and Rhythm: Normal rate and regular rhythm.      Heart sounds: Normal heart sounds. No murmur. No friction rub. No gallop.    Pulmonary:      Effort: Pulmonary effort is normal.      Breath sounds: Normal breath sounds.   Chest:      Chest wall: No tenderness.   Abdominal:      General: There is no distension.      Palpations: Abdomen is soft.      Tenderness: There is no abdominal tenderness. There is no guarding.   Musculoskeletal: Normal range of motion.   Skin:     General: Skin is warm.      Capillary Refill: Capillary refill takes less than 2 seconds.   Neurological:      Mental Status: He is alert and oriented to person, place, and time.   Psychiatric:         Mood and Affect: Mood normal.           CRANIAL NERVES     CN III, IV, VI   Pupils are equal, round, and reactive to light.       Significant Labs:   ABGs: No results for input(s): PH, PCO2, HCO3, POCSATURATED, BE, TOTALHB, COHB, METHB, O2HB, POCFIO2 in the last  48 hours.  CBC:   Recent Labs   Lab 10/24/20  0424 10/25/20  0406   WBC 6.28 5.13   HGB 13.9* 13.8*   HCT 42.8 42.2   * 135*     CMP:   Recent Labs   Lab 10/24/20  0424 10/25/20  0406     140 139  139   K 4.1  4.1 4.2  4.2     106 105  105   CO2 26  26 27  27   GLU 78  78 82  82   BUN 21  21 23  23   CREATININE 1.3  1.3 1.5*  1.5*   CALCIUM 8.7  8.7 8.8  8.8   PROT 6.7 6.8   ALBUMIN 3.0* 3.0*   BILITOT 0.7 0.4   ALKPHOS 73 72   AST 19 19   ALT 15 15   ANIONGAP 8  8 7*  7*   EGFRNONAA 48*  48* 40*  40*     Cardiac Markers: No results for input(s): CKMB, MYOGLOBIN, BNP, TROPISTAT in the last 48 hours.  Coagulation:   No results for input(s): PT, INR, APTT in the last 48 hours.  Lactic Acid: No results for input(s): LACTATE in the last 48 hours.  Lipid Panel: No results for input(s): CHOL, HDL, LDLCALC, TRIG, CHOLHDL in the last 48 hours.  Magnesium: No results for input(s): MG in the last 48 hours.  POCT Glucose: No results for input(s): POCTGLUCOSE in the last 48 hours.  Prealbumin: No results for input(s): PREALBUMIN in the last 48 hours.  TSH:   Recent Labs   Lab 10/21/20  1029   TSH 0.550     Urine Culture: No results for input(s): LABURIN in the last 48 hours.  Urine Studies: No results for input(s): COLORU, APPEARANCEUA, PHUR, SPECGRAV, PROTEINUA, GLUCUA, KETONESU, BILIRUBINUA, OCCULTUA, NITRITE, UROBILINOGEN, LEUKOCYTESUR, RBCUA, WBCUA, BACTERIA, SQUAMEPITHEL, HYALINECASTS in the last 48 hours.    Invalid input(s): WRIGHTSUR    Significant Imaging: I have reviewed all pertinent imaging results/findings within the past 24 hours.

## 2020-10-25 NOTE — PLAN OF CARE
Plan of care reviewed with patient and daughter. Patient and daughter verbalized understanding. Patient is disoriented to situation.Patient is currently on room air. No signs/symptoms of distress observed or reported. Telemetry applied. Zero true red alarms or ectopy. Bed locked and low, call light within reach, non skid socks applied, josselyn sitter at bedside, bed alarm on, side rails x2. Instructed to call if needing assistance. Will continue to monitor.

## 2020-10-26 ENCOUNTER — TELEPHONE (OUTPATIENT)
Dept: FAMILY MEDICINE | Facility: CLINIC | Age: 85
End: 2020-10-26

## 2020-10-26 VITALS
HEIGHT: 72 IN | HEART RATE: 62 BPM | WEIGHT: 138.88 LBS | OXYGEN SATURATION: 95 % | BODY MASS INDEX: 18.81 KG/M2 | RESPIRATION RATE: 17 BRPM | TEMPERATURE: 98 F | DIASTOLIC BLOOD PRESSURE: 60 MMHG | SYSTOLIC BLOOD PRESSURE: 133 MMHG

## 2020-10-26 PROBLEM — N17.9 AKI (ACUTE KIDNEY INJURY): Status: RESOLVED | Noted: 2020-10-21 | Resolved: 2020-10-26

## 2020-10-26 PROBLEM — I47.10 SVT (SUPRAVENTRICULAR TACHYCARDIA): Status: RESOLVED | Noted: 2017-10-31 | Resolved: 2020-10-26

## 2020-10-26 LAB
ALBUMIN SERPL BCP-MCNC: 3 G/DL (ref 3.5–5.2)
ALP SERPL-CCNC: 71 U/L (ref 55–135)
ALT SERPL W/O P-5'-P-CCNC: 14 U/L (ref 10–44)
ANION GAP SERPL CALC-SCNC: 6 MMOL/L (ref 8–16)
ANION GAP SERPL CALC-SCNC: 6 MMOL/L (ref 8–16)
AST SERPL-CCNC: 17 U/L (ref 10–40)
BASOPHILS # BLD AUTO: 0.05 K/UL (ref 0–0.2)
BASOPHILS NFR BLD: 0.9 % (ref 0–1.9)
BILIRUB SERPL-MCNC: 0.5 MG/DL (ref 0.1–1)
BUN SERPL-MCNC: 23 MG/DL (ref 8–23)
BUN SERPL-MCNC: 23 MG/DL (ref 8–23)
CALCIUM SERPL-MCNC: 9 MG/DL (ref 8.7–10.5)
CALCIUM SERPL-MCNC: 9 MG/DL (ref 8.7–10.5)
CHLORIDE SERPL-SCNC: 107 MMOL/L (ref 95–110)
CHLORIDE SERPL-SCNC: 107 MMOL/L (ref 95–110)
CO2 SERPL-SCNC: 26 MMOL/L (ref 23–29)
CO2 SERPL-SCNC: 26 MMOL/L (ref 23–29)
CREAT SERPL-MCNC: 1.5 MG/DL (ref 0.5–1.4)
CREAT SERPL-MCNC: 1.5 MG/DL (ref 0.5–1.4)
DIFFERENTIAL METHOD: ABNORMAL
EOSINOPHIL # BLD AUTO: 0.3 K/UL (ref 0–0.5)
EOSINOPHIL NFR BLD: 4.8 % (ref 0–8)
ERYTHROCYTE [DISTWIDTH] IN BLOOD BY AUTOMATED COUNT: 14.5 % (ref 11.5–14.5)
EST. GFR  (AFRICAN AMERICAN): 47 ML/MIN/1.73 M^2
EST. GFR  (AFRICAN AMERICAN): 47 ML/MIN/1.73 M^2
EST. GFR  (NON AFRICAN AMERICAN): 40 ML/MIN/1.73 M^2
EST. GFR  (NON AFRICAN AMERICAN): 40 ML/MIN/1.73 M^2
GLUCOSE SERPL-MCNC: 79 MG/DL (ref 70–110)
GLUCOSE SERPL-MCNC: 79 MG/DL (ref 70–110)
HCT VFR BLD AUTO: 43.8 % (ref 40–54)
HGB BLD-MCNC: 14.1 G/DL (ref 14–18)
IMM GRANULOCYTES # BLD AUTO: 0.01 K/UL (ref 0–0.04)
IMM GRANULOCYTES NFR BLD AUTO: 0.2 % (ref 0–0.5)
LYMPHOCYTES # BLD AUTO: 1.3 K/UL (ref 1–4.8)
LYMPHOCYTES NFR BLD: 22.4 % (ref 18–48)
MCH RBC QN AUTO: 28.4 PG (ref 27–31)
MCHC RBC AUTO-ENTMCNC: 32.2 G/DL (ref 32–36)
MCV RBC AUTO: 88 FL (ref 82–98)
MONOCYTES # BLD AUTO: 0.6 K/UL (ref 0.3–1)
MONOCYTES NFR BLD: 10 % (ref 4–15)
NEUTROPHILS # BLD AUTO: 3.4 K/UL (ref 1.8–7.7)
NEUTROPHILS NFR BLD: 61.7 % (ref 38–73)
NRBC BLD-RTO: 0 /100 WBC
PLATELET # BLD AUTO: 136 K/UL (ref 150–350)
PMV BLD AUTO: 11.3 FL (ref 9.2–12.9)
POTASSIUM SERPL-SCNC: 4.2 MMOL/L (ref 3.5–5.1)
POTASSIUM SERPL-SCNC: 4.2 MMOL/L (ref 3.5–5.1)
PROT SERPL-MCNC: 6.8 G/DL (ref 6–8.4)
RBC # BLD AUTO: 4.97 M/UL (ref 4.6–6.2)
SODIUM SERPL-SCNC: 139 MMOL/L (ref 136–145)
SODIUM SERPL-SCNC: 139 MMOL/L (ref 136–145)
WBC # BLD AUTO: 5.58 K/UL (ref 3.9–12.7)

## 2020-10-26 PROCEDURE — 85025 COMPLETE CBC W/AUTO DIFF WBC: CPT

## 2020-10-26 PROCEDURE — 25000003 PHARM REV CODE 250: Performed by: NURSE PRACTITIONER

## 2020-10-26 PROCEDURE — 94761 N-INVAS EAR/PLS OXIMETRY MLT: CPT

## 2020-10-26 PROCEDURE — 80053 COMPREHEN METABOLIC PANEL: CPT

## 2020-10-26 PROCEDURE — 25000003 PHARM REV CODE 250: Performed by: FAMILY MEDICINE

## 2020-10-26 PROCEDURE — 36415 COLL VENOUS BLD VENIPUNCTURE: CPT

## 2020-10-26 RX ORDER — METOPROLOL TARTRATE 25 MG/1
12.5 TABLET, FILM COATED ORAL 2 TIMES DAILY
Qty: 30 TABLET | Refills: 11
Start: 2020-10-26 | End: 2021-05-01

## 2020-10-26 RX ADMIN — PANTOPRAZOLE SODIUM 40 MG: 40 TABLET, DELAYED RELEASE ORAL at 08:10

## 2020-10-26 RX ADMIN — PRAVASTATIN SODIUM 80 MG: 40 TABLET ORAL at 08:10

## 2020-10-26 RX ADMIN — METOPROLOL TARTRATE 12.5 MG: 25 TABLET, FILM COATED ORAL at 08:10

## 2020-10-26 RX ADMIN — FINASTERIDE 5 MG: 5 TABLET, FILM COATED ORAL at 08:10

## 2020-10-26 RX ADMIN — DOCUSATE SODIUM 100 MG: 100 CAPSULE, LIQUID FILLED ORAL at 08:10

## 2020-10-26 RX ADMIN — ASPIRIN 81 MG 81 MG: 81 TABLET ORAL at 08:10

## 2020-10-26 NOTE — PLAN OF CARE
[place] left message for  Franny in regards to transportation set up for patient to return to the [facility].

## 2020-10-26 NOTE — PLAN OF CARE
Problem: Adult Inpatient Plan of Care  Goal: Plan of Care Review  Description: Chart and Care plan reviewed.    Outcome: Ongoing, Progressing  Virtual Nurse: Labs, notes and care plan reviewed

## 2020-10-26 NOTE — TELEPHONE ENCOUNTER
Pharmacy called to clarify script for metoprolol.  Sig states to take 0.5mg two times daily however total states 12.5mg.  Please advise.

## 2020-10-26 NOTE — PLAN OF CARE
[facility]    FACILITY TRANSFER ORDERS      Patient Name: Toby Green  YOB: 1930    PCP: Linsdey Du MD   PCP Address: [address] / [place] [address]  PCP Phone Number: [phone]  PCP Fax: [phone]    Encounter Date: 10/26/2020    Admit to: [facility]    Vital Signs:  Routine    Diagnoses:   Active Hospital Problems    Diagnosis  POA    *NSTEMI (non-ST elevated myocardial infarction) [I21.4]  Yes    Late onset Alzheimer's disease with behavioral disturbance [G30.1, F02.81]  Yes    Constipation [K59.00]  Yes    Benign essential hypertension [I10]  Yes    Benign prostatic hyperplasia without lower urinary tract symptoms [N40.0]  Yes    GERD (gastroesophageal reflux disease) [K21.9]  Yes    Erectile dysfunction [N52.9]  Yes    Hyperlipidemia [E78.5]  Yes      Resolved Hospital Problems    Diagnosis Date Resolved POA    PRESLEY (acute kidney injury) [N17.9] 10/26/2020 Yes    SVT (supraventricular tachycardia) [I47.1] 10/26/2020 Yes       Allergies:  Review of patient's allergies indicates:   Allergen Reactions    Naproxen      Other reaction(s): bleeding    Naproxen sodium Other (See Comments)     Other reaction(s): stomch bleeding    Ticlopidine Other (See Comments)     Other reaction(s): extreme bleeding       Diet: cardiac diet    Activities: Activity as tolerated    Nursing: OOB  TID  Fall precautions  Aspiration precautions  Check vitals per facility protocol       CONSULTS:    Physical Therapy to evaluate and treat.  and Occupational Therapy to evaluate and treat.    MISCELLANEOUS CARE:  Routine Skin for Bedridden Patients: Apply moisture barrier cream to all skin folds and wet areas in perineal area daily and after baths and all bowel movements.      Medications: Review discharge medications with patient and family and provide education.      Current Discharge Medication List      START taking these medications    Details   metoprolol  tartrate (LOPRESSOR) 25 MG tablet Take 0.5 tablets (12.5 mg total) by mouth 2 (two) times daily.  Qty: 30 tablet, Refills: 11    Comments: .         CONTINUE these medications which have NOT CHANGED    Details   amitriptyline (ELAVIL) 25 MG tablet Take 25 mg by mouth every evening.      donepeziL (ARICEPT) 10 MG tablet Take 10 mg by mouth every evening.       finasteride (PROSCAR) 5 mg tablet Take 1 tablet (5 mg total) by mouth once daily.  Qty: 90 tablet, Refills: 4      food supplemt, lactose-reduced (ENSURE) Liqd Take 240 mLs by mouth 2 (two) times a day.      multivitamin (THERAGRAN) tablet Take 1 tablet by mouth once daily.      OXcarbazepine (TRILEPTAL) 150 MG Tab Take 75 mg by mouth every evening.      pravastatin (PRAVACHOL) 80 MG tablet TAKE 1 TABLET (80 MG TOTAL) BY MOUTH ONCE DAILY.  Qty: 90 tablet, Refills: 4    Associated Diagnoses: Hyperlipidemia, unspecified hyperlipidemia type      tamsulosin (FLOMAX) 0.4 mg Cap Take 1 capsule (0.4 mg total) by mouth nightly.  Qty: 90 capsule, Refills: 4    Associated Diagnoses: Benign non-nodular prostatic hyperplasia without lower urinary tract symptoms      cloNIDine (CATAPRES) 0.1 MG tablet Take 0.1 mg by mouth every 6 (six) hours as needed. PRN systolic >160 and/or diastolic >100.    Comments: .      cyanocobalamin, vitamin B-12, 250 mcg Lozg Place 1 lozenge under the tongue once daily.  Qty: 100 lozenge, Refills: 4    Associated Diagnoses: Low serum vitamin B12         STOP taking these medications       diltiaZEM (CARDIZEM CD) 240 MG 24 hr capsule Comments:   Reason for Stopping:         diltiaZEM (CARDIZEM CD) 240 MG 24 hr capsule Comments:   Reason for Stopping:         docusate sodium (COLACE) 100 MG capsule Comments:   Reason for Stopping:         LOTEMAX 0.5 % DrpG Comments:   Reason for Stopping:         omeprazole (PRILOSEC) 40 MG capsule Comments:   Reason for Stopping:         QUEtiapine (SEROQUEL) 25 MG Tab Comments:   Reason for Stopping:                     _________________________________  Sanchez Aguirre, NP  10/26/2020

## 2020-10-26 NOTE — PLAN OF CARE
10/26/20 1456   Final Note   Assessment Type Final Discharge Note   Anticipated Discharge Disposition [facility]   What phone number can be called within the next 1-3 days to see how you are doing after discharge? [phone]   Right Care Referral Info   Post Acute Recommendation Other   Referral Type NH   Facility Name  [facility] [place]

## 2020-10-26 NOTE — PLAN OF CARE
"Care plan reviewed. Cardiac monitor in place. Safety maintained, bed at lowest position, wheels locked, bed alarm on, tele sitter in place. Pt instructed to use call light for assistance, voiced understanding. 1049am- pt had bradycardic episode per tele monitor tech, pt hr was in the 30"s.   1051am - Assessed pt at this time, pt was asleep upon entering room but easily aroused by tactile stimuli, HR was at 51 then fluctuating to the high 40's Dr. Westbrook notified. Pt did receive metoprolol as scheduled this AM, HR at time of administration was in the 70s and Dr. Westbrook was informed of this.   1103am pt is being discharged to [facility] home.  1107 - spoke to pt's daughter Kerri Green and updated her on pt's current status and made her aware of the bradycardic episode and that pt is stabled.   1150- called report to [facility]  And spoke with URIEL Ibrahim.   1155- lunch provided   1210- cardiac monitor removed, IV removed.   1325- pt is waiting on transport. Obtained HR at this time 62. Pt is stabled.   1410- pt was picked up by transport. Called Daughter Mrs. Manning to make her aware.     "

## 2020-10-26 NOTE — PLAN OF CARE
Plan of care reviewed with patient. Patient voiced understanding. NSR to sinus aarti on monitor. No acute distress noted. Side rails x 2, bed in lowest position, call bell within reach, patient advised to call for assistance. Bed alarm maintained for patient safety. Tele sitter at bedside. Will continue to monitor.

## 2020-10-26 NOTE — PLAN OF CARE
NH orders sent to Franny at the [facility]. Per, Franny NH orders have been reviewed. Franny to notify [place] once transportation has been set up. Bedside nurse to call report to [phone].            10/26/20 1104   Post-Acute Status   Post-Acute Authorization Placement   Post-Acute Placement Status Set-up Complete   Discharge Plan   Discharge Plan A Return to nursing home      10/26/20 1104   Post-Acute Status   Post-Acute Authorization Placement   Post-Acute Placement Status Set-up Complete   Discharge Plan   Discharge Plan A Return to nursing home

## 2020-10-28 ENCOUNTER — OUTSIDE PLACE OF SERVICE (OUTPATIENT)
Dept: FAMILY MEDICINE | Facility: CLINIC | Age: 85
End: 2020-10-28
Payer: MEDICARE

## 2020-10-28 PROCEDURE — 99308 SBSQ NF CARE LOW MDM 20: CPT | Mod: ,,, | Performed by: FAMILY MEDICINE

## 2020-10-28 PROCEDURE — 99308 PR NURSING FAC CARE, SUBSEQ, MINOR COMPLIC: ICD-10-PCS | Mod: ,,, | Performed by: FAMILY MEDICINE

## 2020-10-30 NOTE — DISCHARGE SUMMARY
[facility] Hospital Medicine  Discharge Summary      Patient Name: Toby Green  MRN: [id number]  Admission Date: 10/21/2020  Hospital Length of Stay: 5 days  Discharge Date and Time: 10/26/2020  2:54 PM  Attending Physician: No att. 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 He also has a past surgical history of tonsillectomy, retinal detachment, pars plana vitrectomy, colonoscopy, and cataract implants. He does not drink alcohol, use illicit drugs, or smoke cigarettes. His PCP is Dr. Lindsey Du. He presents to [facility] ED via ambulance with increased heart rate by nursing home staff. Patient is confused, unable to obtain assessment. No complaints of pain currently. ED workup noted for hemoglobin 13.6, BUN--31, creatinine--1.6, calcium--8.6, elevated troponin, negative COVID. CXR-- There is overall a diminished inspiration.  The cardiac silhouette is not enlarged.  Calcification is present along the wall of the aorta.  There is no pleural effusion.  Osseous structures show degenerative changes.  Soft tissue prominence of the upper mediastinum correlates to tortuous vascular structures.  Lung bases demonstrate mild prominence of interstitial markings with some mild superimposed airspace opacity on the left, airspace opacity representing a change compared to prior imaging. He was admitted to the [facility] medicine department for further care.     * No surgery found *      Hospital Course:   The patient was admitted to the [facility]  hospital medicine department for further care. [facility] cardiology was consulted. Starting low dose BB. Considering increasing BB 2/2 runs SVT. He was just charged back to the [facility] home---we followed Cardiology's recs---low dose Metoprolol and we stopped Cardizem. Will need to follow up with his PCP in 2 weeks.     Consults:   Consults (From admission, onward)        Status Ordering Provider     Inpatient consult to Cardiology-[facility]  Once     Provider:  (Not yet assigned)    VALERIY Carter          No new Assessment & Plan notes have been filed under this hospital service since the last note was generated.  Service: Hospital Medicine    Final Active Diagnoses:    Diagnosis Date Noted POA    PRINCIPAL PROBLEM:  NSTEMI (non-ST elevated myocardial infarction) [I21.4] 10/21/2020 Yes    Late onset Alzheimer's disease with behavioral disturbance [G30.1, F02.81] 11/08/2019 Yes    Constipation [K59.00] 05/23/2014 Yes    Benign essential hypertension [I10]  Yes    Benign prostatic hyperplasia without lower urinary tract symptoms [N40.0]  Yes    GERD (gastroesophageal reflux disease) [K21.9]  Yes    Erectile dysfunction [N52.9]  Yes    Hyperlipidemia [E78.5]  Yes      Problems Resolved During this Admission:    Diagnosis Date Noted Date Resolved POA    PRESLEY (acute kidney injury) [N17.9] 10/21/2020 10/26/2020 Yes    SVT (supraventricular tachycardia) [I47.1] 10/31/2017 10/26/2020 Yes       Discharged Condition: stable    Disposition: Another Health Care Inst*    Follow Up:  Follow-up Information     Lindsey Du MD In 1 week.    Specialty: Family Medicine  Contact information:  [address]  [address]  [place] [id number] [phone]                 Patient Instructions:      Diet Cardiac     Notify your health care provider if you experience any of the following:  temperature >100.4     Notify your health care provider if you experience any of the following:  persistent nausea and vomiting or  diarrhea     Notify your health care provider if you experience any of the following:  severe uncontrolled pain     Notify your health care provider if you experience any of the following:  difficulty breathing or increased cough     Notify your health care provider if you experience any of the following:  severe persistent headache     Notify your health care provider if you experience any of the following:  worsening rash     Notify your health care provider if you experience any of the following:  persistent dizziness, light-headedness, or visual disturbances     Notify your health care provider if you experience any of the following:  increased confusion or weakness       Significant Diagnostic Studies: Labs: BMP: No results for input(s): GLU, NA, K, CL, CO2, BUN, CREATININE, CALCIUM, MG in the last 48 hours. and CBC No results for input(s): WBC, HGB, HCT, PLT in the last 48 hours.    Pending Diagnostic Studies:     None         Medications:  Reconciled Home Medications:      Medication List      START taking these medications    metoprolol tartrate 25 MG tablet  Commonly known as: LOPRESSOR  Take 0.5 tablets (12.5 mg total) by mouth 2 (two) times daily.        CHANGE how you take these medications    donepeziL 10 MG tablet  Commonly known as: ARICEPT  Take 10 mg by mouth every evening.  What changed: Another medication with the same name was removed. Continue taking this medication, and follow the directions you see here.     pravastatin 80 MG tablet  Commonly known as: PRAVACHOL  TAKE 1 TABLET (80 MG TOTAL) BY MOUTH ONCE DAILY.  What changed:   · how much to take  · how to take this  · when to take this        CONTINUE taking these medications    amitriptyline 25 MG tablet  Commonly known as: ELAVIL  Take 25 mg by mouth every evening.     cloNIDine 0.1 MG tablet  Commonly known as: CATAPRES  Take 0.1 mg by mouth every 6 (six) hours as needed. PRN systolic >160 and/or diastolic >100.     cyanocobalamin (vitamin  B-12) 250 mcg Lozg  Place 1 lozenge under the tongue once daily.     ENSURE Liqd  Generic drug: food supplemt, lactose-reduced  Take 240 mLs by mouth 2 (two) times a day.     finasteride 5 mg tablet  Commonly known as: PROSCAR  Take 1 tablet (5 mg total) by mouth once daily.     multivitamin tablet  Commonly known as: THERAGRAN  Take 1 tablet by mouth once daily.     OXcarbazepine 150 MG Tab  Commonly known as: TRILEPTAL  Take 75 mg by mouth every evening.     tamsulosin 0.4 mg Cap  Commonly known as: FLOMAX  Take 1 capsule (0.4 mg total) by mouth nightly.        STOP taking these medications    diltiaZEM 240 MG 24 hr capsule  Commonly known as: CARDIZEM CD     docusate sodium 100 MG capsule  Commonly known as: COLACE     LOTEMAX 0.5 % Drpg  Generic drug: loteprednol etabonate     omeprazole 40 MG capsule  Commonly known as: PriLOSEC     QUEtiapine 25 MG Tab  Commonly known as: SEROQUEL            Indwelling Lines/Drains at time of discharge:   Lines/Drains/Airways     None                 Time spent on the discharge of patient: 35 minutes  Patient was seen and examined on the date of discharge and determined to be suitable for discharge.         Sanchez Aguirre NP  Department of Hospital Medicine  [facility]

## 2020-11-04 ENCOUNTER — OUTSIDE PLACE OF SERVICE (OUTPATIENT)
Dept: FAMILY MEDICINE | Facility: CLINIC | Age: 85
End: 2020-11-04
Payer: MEDICARE

## 2020-11-04 PROCEDURE — 99307 PR NURSING FAC CARE, SUBSEQ, IMPROVING: ICD-10-PCS | Mod: ,,, | Performed by: FAMILY MEDICINE

## 2020-11-04 PROCEDURE — 99307 SBSQ NF CARE SF MDM 10: CPT | Mod: ,,, | Performed by: FAMILY MEDICINE

## 2020-11-18 ENCOUNTER — OUTSIDE PLACE OF SERVICE (OUTPATIENT)
Dept: FAMILY MEDICINE | Facility: CLINIC | Age: 85
End: 2020-11-18
Payer: MEDICARE

## 2020-11-18 PROCEDURE — 99307 SBSQ NF CARE SF MDM 10: CPT | Mod: ,,, | Performed by: FAMILY MEDICINE

## 2020-11-18 PROCEDURE — 99307 PR NURSING FAC CARE, SUBSEQ, IMPROVING: ICD-10-PCS | Mod: ,,, | Performed by: FAMILY MEDICINE

## 2020-12-16 ENCOUNTER — OUTPATIENT CASE MANAGEMENT (OUTPATIENT)
Dept: ADMINISTRATIVE | Facility: OTHER | Age: 85
End: 2020-12-16
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
## 2020-12-20 ENCOUNTER — HOSPITAL ENCOUNTER (EMERGENCY)
Facility: HOSPITAL | Age: 85
Discharge: HOME | End: 2020-12-20
Attending: EMERGENCY MEDICINE
Payer: MEDICARE

## 2020-12-20 ENCOUNTER — HOSPITAL ENCOUNTER (EMERGENCY)
Facility: HOSPITAL | Age: 85
End: 2020-12-20
Attending: FAMILY MEDICINE
Payer: MEDICARE

## 2020-12-20 VITALS
HEART RATE: 87 BPM | DIASTOLIC BLOOD PRESSURE: 106 MMHG | WEIGHT: 138 LBS | BODY MASS INDEX: 18.72 KG/M2 | RESPIRATION RATE: 16 BRPM | SYSTOLIC BLOOD PRESSURE: 182 MMHG | TEMPERATURE: 98 F | OXYGEN SATURATION: 96 %

## 2020-12-20 VITALS
OXYGEN SATURATION: 98 % | HEART RATE: 87 BPM | SYSTOLIC BLOOD PRESSURE: 156 MMHG | DIASTOLIC BLOOD PRESSURE: 91 MMHG | RESPIRATION RATE: 18 BRPM

## 2020-12-20 DIAGNOSIS — F03.90 DEMENTIA WITHOUT BEHAVIORAL DISTURBANCE, UNSPECIFIED DEMENTIA TYPE: Primary | ICD-10-CM

## 2020-12-20 DIAGNOSIS — R41.82 ALTERED MENTAL STATE: ICD-10-CM

## 2020-12-20 DIAGNOSIS — I63.9 CEREBROVASCULAR ACCIDENT (CVA), UNSPECIFIED MECHANISM: Primary | ICD-10-CM

## 2020-12-20 PROBLEM — I63.512 ACUTE ISCHEMIC LEFT MCA STROKE: Status: ACTIVE | Noted: 2020-12-20

## 2020-12-20 PROBLEM — R47.9 DIFFICULTY WITH SPEECH: Status: ACTIVE | Noted: 2020-12-20

## 2020-12-20 LAB
ALBUMIN SERPL BCP-MCNC: 4.6 G/DL (ref 3.5–5.2)
ALP SERPL-CCNC: 84 U/L (ref 38–126)
ALT SERPL W/O P-5'-P-CCNC: 24 U/L (ref 10–44)
ANION GAP SERPL CALC-SCNC: 13 MMOL/L (ref 8–16)
APTT BLDCRRT: 24.7 SEC (ref 21–32)
AST SERPL-CCNC: 36 U/L (ref 15–46)
BASOPHILS # BLD AUTO: 0.04 K/UL (ref 0–0.2)
BASOPHILS NFR BLD: 0.6 % (ref 0–1.9)
BILIRUB SERPL-MCNC: 1 MG/DL (ref 0.1–1)
BILIRUB UR QL STRIP: NEGATIVE
CALCIUM SERPL-MCNC: 9.8 MG/DL (ref 8.7–10.5)
CHLORIDE SERPL-SCNC: 103 MMOL/L (ref 95–110)
CHOLEST SERPL-MCNC: 183 MG/DL (ref 120–199)
CHOLEST/HDLC SERPL: 3.1 {RATIO} (ref 2–5)
CLARITY UR REFRACT.AUTO: CLEAR
CO2 SERPL-SCNC: 25 MMOL/L (ref 23–29)
COLOR UR AUTO: YELLOW
CREAT SERPL-MCNC: 1.25 MG/DL (ref 0.5–1.4)
CTP QC/QA: YES
DIFFERENTIAL METHOD: ABNORMAL
EOSINOPHIL # BLD AUTO: 0.2 K/UL (ref 0–0.5)
EOSINOPHIL NFR BLD: 2.6 % (ref 0–8)
ERYTHROCYTE [DISTWIDTH] IN BLOOD BY AUTOMATED COUNT: 14 % (ref 11.5–14.5)
EST. GFR  (AFRICAN AMERICAN): 58.2 ML/MIN/1.73 M^2
EST. GFR  (NON AFRICAN AMERICAN): 50.4 ML/MIN/1.73 M^2
GLUCOSE SERPL-MCNC: 154 MG/DL (ref 70–110)
GLUCOSE UR QL STRIP: NEGATIVE
HCT VFR BLD AUTO: 52.7 % (ref 40–54)
HDLC SERPL-MCNC: 60 MG/DL (ref 40–75)
HDLC SERPL: 32.8 % (ref 20–50)
HGB BLD-MCNC: 16.7 G/DL (ref 14–18)
HGB UR QL STRIP: NEGATIVE
IMM GRANULOCYTES # BLD AUTO: 0.02 K/UL (ref 0–0.04)
IMM GRANULOCYTES NFR BLD AUTO: 0.3 % (ref 0–0.5)
KETONES UR QL STRIP: NEGATIVE
LDLC SERPL CALC-MCNC: 105.4 MG/DL (ref 63–159)
LEUKOCYTE ESTERASE UR QL STRIP: NEGATIVE
LYMPHOCYTES # BLD AUTO: 1.2 K/UL (ref 1–4.8)
LYMPHOCYTES NFR BLD: 18.1 % (ref 18–48)
MCH RBC QN AUTO: 28.8 PG (ref 27–31)
MCHC RBC AUTO-ENTMCNC: 31.7 G/DL (ref 32–36)
MCV RBC AUTO: 91 FL (ref 82–98)
MONOCYTES # BLD AUTO: 0.3 K/UL (ref 0.3–1)
MONOCYTES NFR BLD: 5.1 % (ref 4–15)
NEUTROPHILS # BLD AUTO: 4.9 K/UL (ref 1.8–7.7)
NEUTROPHILS NFR BLD: 73.3 % (ref 38–73)
NITRITE UR QL STRIP: NEGATIVE
NONHDLC SERPL-MCNC: 123 MG/DL
NRBC BLD-RTO: 0 /100 WBC
NT-PROBNP SERPL-MCNC: 332 PG/ML (ref 5–1800)
PH UR STRIP: 6 [PH] (ref 5–8)
PLATELET # BLD AUTO: 115 K/UL (ref 150–350)
PMV BLD AUTO: 12.1 FL (ref 9.2–12.9)
POCT GLUCOSE: 128 MG/DL (ref 70–110)
POTASSIUM SERPL-SCNC: 4.7 MMOL/L (ref 3.5–5.1)
PROT SERPL-MCNC: 8.9 G/DL (ref 6–8.4)
PROT UR QL STRIP: ABNORMAL
RBC # BLD AUTO: 5.79 M/UL (ref 4.6–6.2)
SARS-COV-2 RDRP RESP QL NAA+PROBE: NEGATIVE
SODIUM SERPL-SCNC: 141 MMOL/L (ref 136–145)
SP GR UR STRIP: 1.02 (ref 1–1.03)
TRIGL SERPL-MCNC: 88 MG/DL (ref 30–150)
TROPONIN I SERPL-MCNC: <0.012 NG/ML (ref 0.01–0.03)
URN SPEC COLLECT METH UR: ABNORMAL
UROBILINOGEN UR STRIP-ACNC: NEGATIVE EU/DL
UUN UR-MCNC: 18 MG/DL (ref 2–20)
WBC # BLD AUTO: 6.62 K/UL (ref 3.9–12.7)

## 2020-12-20 PROCEDURE — 80061 LIPID PANEL: CPT

## 2020-12-20 PROCEDURE — 85025 COMPLETE CBC W/AUTO DIFF WBC: CPT | Mod: ER

## 2020-12-20 PROCEDURE — 93010 EKG 12-LEAD: ICD-10-PCS | Mod: ,,, | Performed by: INTERNAL MEDICINE

## 2020-12-20 PROCEDURE — G0426 INPT/ED TELECONSULT50: HCPCS | Mod: 95,G0,, | Performed by: PSYCHIATRY & NEUROLOGY

## 2020-12-20 PROCEDURE — 25000003 PHARM REV CODE 250: Mod: ER | Performed by: FAMILY MEDICINE

## 2020-12-20 PROCEDURE — 80053 COMPREHEN METABOLIC PANEL: CPT | Mod: ER

## 2020-12-20 PROCEDURE — 93005 ELECTROCARDIOGRAM TRACING: CPT | Mod: ER

## 2020-12-20 PROCEDURE — U0002 COVID-19 LAB TEST NON-CDC: HCPCS | Mod: ER | Performed by: FAMILY MEDICINE

## 2020-12-20 PROCEDURE — 82962 GLUCOSE BLOOD TEST: CPT | Mod: ER

## 2020-12-20 PROCEDURE — 25000003 PHARM REV CODE 250: Performed by: EMERGENCY MEDICINE

## 2020-12-20 PROCEDURE — 85730 THROMBOPLASTIN TIME PARTIAL: CPT | Mod: ER

## 2020-12-20 PROCEDURE — G0426 PR INPT TELEHEALTH CONSULT 50M: ICD-10-PCS | Mod: 95,G0,, | Performed by: PSYCHIATRY & NEUROLOGY

## 2020-12-20 PROCEDURE — 99284 PR EMERGENCY DEPT VISIT,LEVEL IV: ICD-10-PCS | Mod: ,,, | Performed by: EMERGENCY MEDICINE

## 2020-12-20 PROCEDURE — 81003 URINALYSIS AUTO W/O SCOPE: CPT | Mod: ER

## 2020-12-20 PROCEDURE — 93010 ELECTROCARDIOGRAM REPORT: CPT | Mod: ,,, | Performed by: INTERNAL MEDICINE

## 2020-12-20 PROCEDURE — 99284 EMERGENCY DEPT VISIT MOD MDM: CPT | Mod: ,,, | Performed by: PSYCHIATRY & NEUROLOGY

## 2020-12-20 PROCEDURE — 99285 EMERGENCY DEPT VISIT HI MDM: CPT | Mod: 25,ER

## 2020-12-20 PROCEDURE — 99284 EMERGENCY DEPT VISIT MOD MDM: CPT | Mod: ,,, | Performed by: EMERGENCY MEDICINE

## 2020-12-20 PROCEDURE — 83880 ASSAY OF NATRIURETIC PEPTIDE: CPT | Mod: ER

## 2020-12-20 PROCEDURE — 99284 PR EMERGENCY DEPT VISIT,LEVEL IV: ICD-10-PCS | Mod: ,,, | Performed by: PSYCHIATRY & NEUROLOGY

## 2020-12-20 PROCEDURE — 94760 N-INVAS EAR/PLS OXIMETRY 1: CPT | Mod: ER

## 2020-12-20 PROCEDURE — 99284 EMERGENCY DEPT VISIT MOD MDM: CPT | Mod: 25,27

## 2020-12-20 PROCEDURE — 84484 ASSAY OF TROPONIN QUANT: CPT | Mod: ER

## 2020-12-20 RX ORDER — NAPROXEN SODIUM 220 MG/1
81 TABLET, FILM COATED ORAL
Status: COMPLETED | OUTPATIENT
Start: 2020-12-20 | End: 2020-12-20

## 2020-12-20 RX ORDER — METOPROLOL TARTRATE 25 MG/1
25 TABLET, FILM COATED ORAL
Status: COMPLETED | OUTPATIENT
Start: 2020-12-20 | End: 2020-12-20

## 2020-12-20 RX ORDER — CLONIDINE HYDROCHLORIDE 0.1 MG/1
0.1 TABLET ORAL
Status: COMPLETED | OUTPATIENT
Start: 2020-12-20 | End: 2020-12-20

## 2020-12-20 RX ADMIN — ASPIRIN 81 MG 81 MG: 81 TABLET ORAL at 04:12

## 2020-12-20 RX ADMIN — METOPROLOL TARTRATE 25 MG: 25 TABLET, FILM COATED ORAL at 06:12

## 2020-12-20 RX ADMIN — CLONIDINE HYDROCHLORIDE 0.1 MG: 0.1 TABLET ORAL at 06:12

## 2020-12-20 NOTE — SUBJECTIVE & OBJECTIVE
Woke up with symptoms?: no    Recent bleeding noted: no  Does the patient take any Blood Thinners? no  Medications: No relevant medications      Past Medical History: hypertension, diabetes, hyperlipidemia, stroke and kidney problems/dialysis    Past Surgical History: no major surgeries within the last 2 weeks    Family History: unable to obtain    Social History: no smoking, no drinking, no drugs    Allergies: Naproxen  Naproxen Sodium  Ticlopidine see above    Review of Systems   Unable to obtain due to aphasia  Objective:   Vitals: Blood pressure (!) 156/91, pulse 105, resp. rate 18, SpO2 (!) 93 %. Height: 6, Weight: 137 lbs, BP: 156/91, Respiratory Rate: 13 and Heart Rate: 84    CT READ: Yes  No hemmorhage. No mass effect. No early infarct signs.  remote infarcts, difuse atrophy    Physical Exam  Physical Exam:  GA: Alert, comfortable, no acute distress.   Pulmonary: normal chest rise  Abdominal: no distension appreciated  Skin: no visible lesions on exposed skin  Neuro:  --GCS: E4V3M6  --Mental Status:  Awake, aphasia, mumbles at time, other times says yes, intermittently follows commands  --CN II-XII grossly intact.   --EOMI   --brainstem: intact  --Motor: no drift throughout when arms placed up, same in legs  --sensory: reports sensation feels the same bilat  --Reflexes: not tested  --Gait: deferred

## 2020-12-20 NOTE — CONSULTS
[facility]  Vascular Neurology  Comprehensive Stroke Center  Tele-Consultation Note      Consults    Consulting Provider: JOHANN WEINER  Current Providers  No providers found    Patient Location:  [facility] EMERGENCY DEPARTMENT Emergency Department  Spoke hospital nurse at bedside with patient assisting consultant.     Patient information was obtained from ED staff.         Assessment/Plan:     STROKE DOCUMENTATION     Acute Stroke Times:   Acute Stroke Times   Last Known Normal Date: 12/20/20  Last Known Normal Time: 1430  Symptom Onset Date: 12/20/20  Symptom Onset Time: 1430  Stroke Team Called Date: 12/20/20  Stroke Team Called Time: 1526  Stroke Team Arrival Date: 12/20/20  Stroke Team Arrival Time: 1530  CT Interpretation Time: 1543    NIH Scale:  Interval: baseline  1a. Level of Consciousness: 0-->Alert, keenly responsive  1b. LOC Questions: 2-->Answers neither question correctly  1c. LOC Commands: 0-->Performs both tasks correctly  2. Best Gaze: 0-->Normal  3. Visual: 0-->No visual loss  4. Facial Palsy: 0-->Normal symmetrical movements  5a. Motor Arm, Left: 0-->No drift, limb holds 90 (or 45) degrees for full 10 secs  5b. Motor Arm, Right: 0-->No drift, limb holds 90 (or 45) degrees for full 10 secs  6a. Motor Leg, Left: 0-->No drift, leg holds 30 degree position for full 5 secs  6b. Motor Leg, Right: 0-->No drift, leg holds 30 degree position for full 5 secs  7. Limb Ataxia: 0-->Absent  8. Sensory: 0-->Normal, no sensory loss  9. Best Language: 1-->Mild-to-moderate aphasia, some obvious loss of fluency or facility of comprehension, without significant limitation on ideas expressed or form of expression. Reduction of speech and/or comprehension, however, makes conversation. . . (see row details)  10. Dysarthria: 1-->Mild-to-moderate dysarthria, patient slurs at least some words and, at worst, can be understood with some difficulty  11. Extinction and Inattention  (formerly Neglect): 0-->No abnormality  Total (NIH Stroke Scale): 4     Modified Canyon Score: 1  Anthony Coma Scale:13   ABCD2 Score:    [id number] Score:   HAS -BLED Score:   ICH Score:   Hunt & Carrera Classification:       Diagnoses:   Acute ischemic left MCA stroke  89 yo NH dementia patient with HLD, HTN, kidney disease and Hx of GIB. Found sitting on floor in his room with word salad (normally very conversant) and not able to follow commands. NIHSS 4 LOC, aphasia and dysarthria. no rift throughout. Patient unable to give any information. Chart review from paperwork of NH reveals no antiplts or anticoagulation. No further information given by EMS to the ED Staff regarding LKN    Acute ischemic stroke versus post ictal state versus infectious/metabolic encephalopathy  Patient is a DNR and Hx of GIB  With Dementia obscuring NIHSS recommend STAT [place] [facility] [place] to assess if having AIS there the neurology team can continue acute stroke algorithm within the tPA window. If MRI negative for AIS then other workup can be pursued.     Antithrombotics for secondary stroke prevention: Antiplatelets: Aspirin: 81 mg daily    Statins for secondary stroke prevention and hyperlipidemia, if present:   Statins: Atorvastatin- 40 mg daily    Aggressive risk factor modification: HTN, DM, HLD, Diet, Exercise, Obesity     Rehab efforts: The patient has been evaluated by a stroke team provider and the therapy needs have been fully considered based off the presenting complaints and exam findings. The following therapy evaluations are needed: PT evaluate and treat, OT evaluate and treat, SLP evaluate and treat    Diagnostics ordered/pending: CTA Head to assess vasculature , CTA Neck/Arch to assess vasculature, HgbA1C to assess blood glucose levels, Lipid Profile to assess cholesterol levels, MRI head without contrast to assess brain parenchyma, TTE to assess cardiac function/status     VTE prophylaxis: Heparin 5000 units SQ every  8 hours    BP parameters: dependent on image findings, for now goal < 220            Blood pressure (!) 156/91, pulse 105, resp. rate 18, SpO2 (!) 93 %.  Alteplase Eligible?: Yes  Alteplase Recommendation: Alteplase not recommended due to Suspected stroke mimic  and history of GIB, DNR status  Possible Interventional Revascularization Candidate? VAN (-)    Disposition Recommendation: transfer to [facility] by  ground  stat    Subjective:     History of Present Illness:  89 yo NH dementia patient with HLD, HTN, kidney disease and Hx of GIB. Found sitting on floor in his room with word salad (normally very conversant) and not able to follow commands. NIHSS 4 LOC, aphasia and dysarthria. no rift throughout. Patient unable to give any information. Chart review from paperwork of NH reveals no antiplts or anticoagulation. No further information given by EMS to the ED Staff regarding LKN      Woke up with symptoms?: no    Recent bleeding noted: no  Does the patient take any Blood Thinners? no  Medications: No relevant medications      Past Medical History: hypertension, diabetes, hyperlipidemia, stroke and kidney problems/dialysis    Past Surgical History: no major surgeries within the last 2 weeks    Family History: unable to obtain    Social History: no smoking, no drinking, no drugs    Allergies: Naproxen  Naproxen Sodium  Ticlopidine see above    Review of Systems   Unable to obtain due to aphasia  Objective:   Vitals: Blood pressure (!) 156/91, pulse 105, resp. rate 18, SpO2 (!) 93 %. Height: 6, Weight: 137 lbs, BP: 156/91, Respiratory Rate: 13 and Heart Rate: 84    CT READ: Yes  No hemmorhage. No mass effect. No early infarct signs.  remote infarcts, difuse atrophy    Physical Exam  Physical Exam:  GA: Alert, comfortable, no acute distress.   Pulmonary: normal chest rise  Abdominal: no distension appreciated  Skin: no visible lesions on exposed skin  Neuro:  --GCS: E4V3M6  --Mental Status:  Awake, aphasia,  mumbles at time, other times says yes, intermittently follows commands  --CN II-XII grossly intact.   --EOMI   --brainstem: intact  --Motor: no drift throughout when arms placed up, same in legs  --sensory: reports sensation feels the same bilat  --Reflexes: not tested  --Gait: deferred            Recommended the emergency room physician to have a brief discussion with the patient and/or family if available regarding the risks and benefits of treatment, and to briefly document the occurrence of that discussion in his clinical encounter note.     The attending portion of this evaluation, treatment, and documentation was performed per Rony Benton MD via audiovisual.    Billing code:  (non-intervention mild to moderate stroke, TIA, some mimics)    · This patient has a critical neurological condition/illness, with some potential for high morbidity and mortality.  · There is a moderate probability for acute neurological change leading to clinical and possibly life-threatening deterioration requiring highest level of physician preparedness for urgent intervention.  · Care was coordinated with other physicians involved in the patient's care.  · Radiologic studies and laboratory data were reviewed and interpreted, and plan of care was re-assessed based on the results.  · Diagnosis, treatment options and prognosis may have been discussed with the patient and/or family members or caregiver.      In your opinion, this was a: Tier 1 Van Negative    Consult End Time: 4:05 PM     Rony Benton MD  Comprehensive Stroke Center  Vascular Neurology   [facility]

## 2020-12-20 NOTE — ED PROVIDER NOTES
Encounter Date: 12/20/2020       History     Chief Complaint   Patient presents with    Cerebrovascular Accident     [facility] States staff found pt sitting on the floor with altered mental status. Last well known was 45mins pta. Pt is awake and alert on arrival. Pt will not follow commands. MD noted weakness on right side.      89-year-old male with history of Alzheimer's dementia has good and bad days.  Patient was found sitting on floor with altered mental status.  Nursing home residents think he had a right-sided drooping.  Patient is confused and unable to hold any conversation.  Last seen normal was 45 min ago as per information passed on to nerves.  Patient is DNR and recent admission secondary to SVT.  And elevated troponin.         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    Constipation 5/23/2014    COPD (chronic obstructive pulmonary disease)     Dementia     Erectile dysfunction     GERD (gastroesophageal reflux disease)     History of GI bleed 12/12/2016    Hyperlipidemia     Iron deficiency anemia 12/13/2016    Nuclear sclerosis 10/19/2012    Renal disorder      Past Surgical History:   Procedure Laterality Date    CATARACT EXTRACTION W/  INTRAOCULAR LENS IMPLANT  12/5/12    OD w/     COLONOSCOPY N/A 10/31/2017    Procedure: COLONOSCOPY;  Surgeon: Oswaldo Zee MD;  [facility];  Service: Endoscopy;  Laterality: N/A;    lump in back removed  2009    lump removal      head    PARS PLANA VITRECTOMY  12/11/12    OD  w/ dr. tavarez    RETINAL DETACHMENT SURGERY  3/26/13    Right eye    TONSILLECTOMY       Family History   Problem Relation Age of Onset    Cancer Mother     Diabetes Mother     Hypertension Father     Stroke Father     Cancer Sister     Cancer Brother     Cataracts Paternal Grandmother     Glaucoma Paternal Grandmother     Hypertension Paternal Grandmother     Amblyopia Neg Hx     Blindness Neg Hx     Macular degeneration Neg Hx     Retinal detachment Neg Hx     Strabismus Neg Hx     Thyroid disease Neg Hx      Social History     Tobacco Use    Smoking status: Former Smoker    Smokeless tobacco: Never Used   Substance Use Topics    Alcohol use: Yes     Alcohol/week: 0.8 standard drinks     Types: 1 Standard drinks or equivalent per week     Comment: soically    Drug use: No     Review of Systems   Unable to perform ROS: Mental status change       Physical Exam     Initial Vitals [12/20/20 1518]   BP Pulse Resp Temp SpO2   (!) 156/91 107 18 -- (!) 93 %      MAP       --         Physical Exam    Nursing note and vitals reviewed.  Constitutional: Vital signs are normal. He appears well-developed and well-nourished. He is not diaphoretic. He is active. No distress.   HENT:   Head: Normocephalic and atraumatic.   Right Ear: Tympanic membrane normal.   Left Ear: Tympanic membrane normal.   Nose: Nose normal.   Mouth/Throat: Oropharynx is clear and moist.   Eyes: Conjunctivae, EOM and lids are normal. Pupils are equal, round, and reactive to light.   Neck: Trachea normal, normal range of motion and full passive range of motion without pain. Neck supple. Normal range of motion present. No neck rigidity.   Cardiovascular: Normal rate, regular rhythm, S1 normal, S2 normal, normal heart sounds, intact distal pulses and normal pulses.   Pulmonary/Chest: Breath sounds normal. No respiratory distress. He has no wheezes. He has no rhonchi. He has no rales.   Abdominal: Soft. Normal appearance and bowel sounds  are normal. He exhibits no distension. There is no abdominal tenderness. There is no guarding.   Musculoskeletal: Normal range of motion.   Lymphadenopathy:     He has no cervical adenopathy.   Neurological: He is alert. He has normal reflexes. GCS eye subscore is 4. GCS verbal subscore is 4. GCS motor subscore is 6.   History limited secondary to dementia.  Patient is very much confused and answers yes yes for everything.  No facial deviation.  Right upper arm no weakness  Right lower leg mild weakness noted.  Left-sided upper and lower extremities with normal power.   Skin: Skin is warm and intact. Capillary refill takes less than 2 seconds. No abrasion, no bruising and no rash noted.   Psychiatric: He has a normal mood and affect. His speech is normal and behavior is normal. Judgment and thought content normal. He is not actively hallucinating. Cognition and memory are normal. He is attentive.         ED Course   Critical Care    Date/Time: 12/20/2020 4:00 PM  Performed by: Raul Posey MD  Authorized by: Raul Posey MD   Direct patient critical care time: 5 minutes  Additional history critical care time: 5 minutes  Ordering / reviewing critical care time: 5 minutes  Documentation critical care time: 5 minutes  Consulting other physicians critical care time: 5 minutes  Consult with family critical care time: 5 minutes  Other critical care time: 5 minutes  Total critical care time (exclusive of procedural time) : 35 minutes  Critical care was necessary to treat or prevent imminent or life-threatening deterioration of the following conditions: CNS failure or compromise.  Critical care was time spent personally by me on the following activities: discussions with consultants, evaluation of patient's response to treatment, obtaining history from patient or surrogate, ordering and review of laboratory studies, pulse oximetry, review of old charts, re-evaluation of patient's condition, ordering and review  of radiographic studies, ordering and performing treatments and interventions and examination of patient.        Labs Reviewed   CBC W/ AUTO DIFFERENTIAL - Abnormal; Notable for the following components:       Result Value    MCHC 31.7 (*)     Platelets 115 (*)     Gran % 73.3 (*)     All other components within normal limits   COMPREHENSIVE METABOLIC PANEL - Abnormal; Notable for the following components:    Glucose 154 (*)     Total Protein 8.9 (*)     eGFR if  58.2 (*)     eGFR if non  50.4 (*)     All other components within normal limits    Narrative:     Trace Hemolysis   POCT GLUCOSE - Abnormal; Notable for the following components:    POCT Glucose 128 (*)     All other components within normal limits   APTT   NT-PRO NATRIURETIC PEPTIDE   URINALYSIS, REFLEX TO URINE CULTURE   TROPONIN I   LIPID PANEL   SARS-COV-2 RNA AMPLIFICATION, QUAL   SARS-COV-2 RDRP GENE     EKG Readings: (Independently Interpreted)   Initial Reading: No STEMI. Rhythm: Sinus Tachycardia. Heart Rate: 102. Ectopy: No Ectopy. Conduction: Normal. ST Segments: Normal ST Segments. T Waves: Normal. Clinical Impression: Sinus Tachycardia       Imaging Results          CT Head Without Contrast (Final result)  Result time 12/20/20 15:41:02    Final result by Seth Rasmussen MD (12/20/20 15:41:02)                 Impression:      1.  Negative for acute intracranial process. Negative for hemorrhage, or skull fracture.    2.  Cerebral atrophy noted.  Intracranial atherosclerotic disease noted.  Small vessel ischemic changes noted.  Bilateral basal ganglia lacunar infarcts.    3.  Stable findings as noted above.    All CT scans at this facility are performed  using dose modulation techniques as appropriate to performed exam including the following:  automated exposure control; adjustment of mA and/or kV according to the patients size (this includes techniques or standardized protocols for targeted exams where dose is  matched to indication/reason for exam: i.e. extremities or head);  iterative reconstruction technique.      Electronically signed by: Seth Rasmussen MD  Date:    12/20/2020  Time:    15:41             Narrative:    EXAMINATION:  CT HEAD WITHOUT CONTRAST    CLINICAL HISTORY:  Altered mental status;    TECHNIQUE:  Axial images through the brain and posterior fossa were obtained without the use of IV contrast.  Sagittal and coronal reconstructions are provided for review.    COMPARISON:  August 8, 2020    FINDINGS:  The ventricles are midline and the CSF spaces are prominent.  The gray-white matter junction is well preserved. Negative for intracranial vascular abnormalities. Negative for mass, mass effect, cerebral edema, hemorrhage or abnormal fluid collections.  Intracranial atherosclerotic disease noted.  Small vessel ischemic changes noted.  Bilateral basal ganglia lacunar infarcts and basal ganglia calcifications noted.  Falx calcifications.  Basal ganglia calcifications.    The skull and scalp are intact.  Minimal patchy ethmoid air cell disease.  The rest of the paranasal sinuses, mastoid air cells, middle ears and ear canals are clear. The globes are intact.  There are postoperative changes to the right lens.                                 Medical Decision Making:   Initial Assessment:   DNR patient with advanced dementia with acute confusion brought to ER with possible right-sided weakness.  Last seen normal 45 min prior to arrival to ED.  Differential Diagnosis:   CVA, dementia, UTI, altered mental status,  Clinical Tests:   Lab Tests: Ordered and Reviewed  Radiological Study: Ordered and Reviewed  Medical Tests: Ordered and Reviewed  ED Management:  Possibility of stroke acute code stroke has been called in for.  Recent history of SVT/elevated troponin and [facility]  As per tele stroke patient is high risk for tPA and will need MRI.  Stat transfer to [facility].  Daughter notified regarding  status by Marie.  [facility] has been called for transfer.                             Clinical Impression:       ICD-10-CM ICD-9-CM   1. Cerebrovascular accident (CVA), unspecified mechanism  I63.9 434.91   2. Altered mental state  R41.82 780.97                          ED Disposition Condition    Transfer to Psych Facility         ED Prescriptions     None        Follow-up Information    None                                      Raul Posey MD  12/20/20 1614       Raul Posey MD  01/08/21 0700

## 2020-12-20 NOTE — ED PROVIDER NOTES
Encounter Date: 12/20/2020       History     Chief Complaint   Patient presents with    [place] tx      AMS with right sided weakness and slurred speech. hx of dementia. Daughter stated that this is his baseline but the nursing home stated it is not. LSK 1430, found at 1515 on floor. No TPA was given, needs a MRI      89-year-old male with a history of advanced dementia presents from nursing home to outside hospital with slurred speech.  Outside hospital sent to main campus for MRI and stroke team eval.  Unable to get any history from the patient secondary to dementia.  EMS denies any issues and route.  Stroke team greeted on arrival and ordered MRI.  Unable to get review of systems secondary to dementia.  Reviewed labs and CT from outside hospital.  The patients available PMH, PSH, Social History, medications, allergies, and triage vital signs were reviewed just prior to their medical evaluation.        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    Constipation 5/23/2014    COPD (chronic obstructive pulmonary disease)     Dementia     Erectile dysfunction     GERD (gastroesophageal reflux disease)     History of GI bleed 12/12/2016    Hyperlipidemia     Iron deficiency anemia 12/13/2016    Nuclear sclerosis 10/19/2012    Renal disorder      Past Surgical History:   Procedure Laterality Date    CATARACT EXTRACTION W/  INTRAOCULAR LENS IMPLANT  12/5/12    OD w/     COLONOSCOPY N/A 10/31/2017    Procedure: COLONOSCOPY;  Surgeon: Oswaldo Zee MD;  Location: [facility];   Service: Endoscopy;  Laterality: N/A;    lump in back removed  2009    lump removal      head    PARS PLANA VITRECTOMY  12/11/12    OD w/ dr. tavarez    RETINAL DETACHMENT SURGERY  3/26/13    Right eye    TONSILLECTOMY       Family History   Problem Relation Age of Onset    Cancer Mother     Diabetes Mother     Hypertension Father     Stroke Father     Cancer Sister     Cancer Brother     Cataracts Paternal Grandmother     Glaucoma Paternal Grandmother     Hypertension Paternal Grandmother     Amblyopia Neg Hx     Blindness Neg Hx     Macular degeneration Neg Hx     Retinal detachment Neg Hx     Strabismus Neg Hx     Thyroid disease Neg Hx      Social History     Tobacco Use    Smoking status: Former Smoker    Smokeless tobacco: Never Used   Substance Use Topics    Alcohol use: Yes     Alcohol/week: 0.8 standard drinks     Types: 1 Standard drinks or equivalent per week     Comment: soically    Drug use: No     Review of Systems   Unable to perform ROS: Dementia       Physical Exam     Initial Vitals   BP Pulse Resp Temp SpO2   12/20/20 1656 12/20/20 1656 12/20/20 1656 12/20/20 1709 12/20/20 1656   (!) 180/100 85 14 97.9 °F (36.6 °C) 98 %      MAP       --                Physical Exam    Constitutional: He appears well-developed and well-nourished. He is not diaphoretic. No distress.   HENT:   Head: Normocephalic and atraumatic.   Nose: Nose normal.   Eyes: Conjunctivae are normal. Right eye exhibits no discharge. Left eye exhibits no discharge.   Neck: Normal range of motion. Neck supple.   Cardiovascular: Normal rate, regular rhythm and intact distal pulses. Exam reveals no gallop and no friction rub.    Murmur heard.  3/6 systolic murmur   Pulmonary/Chest: Breath sounds normal. No respiratory distress. He has no wheezes. He has no rhonchi. He has no rales.   Abdominal: Soft. He exhibits no distension. There is no abdominal tenderness. There is no rebound and no guarding.   Musculoskeletal:  No tenderness or edema.   Neurological: He is alert.   dementia   Skin: Skin is warm and dry. No rash noted. No erythema.   Psychiatric:   dementia         ED Course   Procedures  Labs Reviewed - No data to display       Imaging Results          MRI Brain Ischemic Inter Pro Incl MRA W/O Con (Final result)  Result time 12/20/20 18:35:07    Final result by Jason Steven MD (12/20/20 18:35:07)                 Impression:      No evidence for intracranial hemorrhage or acute major vascular distribution infarct.    MR angiogram of the head and neck appears within normal limits, noting multifocal areas of narrowing throughout the bilateral PCAs without evidence for focal occlusion.    Advanced chronic microvascular ischemic change as well as multiple punctate areas of susceptibility scattered throughout the cortico-subcortical regions, favored to represent multiple remote microhemorrhages.    Electronically signed by resident: Bernabe Amezquita  Date:    12/20/2020  Time:    17:53    Electronically signed by: Jason Steven MD  Date:    12/20/2020  Time:    18:35             Narrative:    EXAMINATION:  MRI BRAIN ISCHEMIC INTERVENTIONAL PROTOCOL INCL MRA W/O CONTRAST    CLINICAL HISTORY:  aphasia;    TECHNIQUE:  Multiplanar multisequence MR imaging of the brain was performed without the use of intravenous contrast.  Exam performed in conjunction with a time-of-flight MR arteriogram of the intracranial vasculature with multiple MIPS reconstructions.    COMPARISON:  CT head without contrast 12/20/2020.    FINDINGS:  Brain:    Prominence of the ventricles and sulci compatible with mild generalized cerebral volume loss.  No hydrocephalus.    No extra-axial blood or fluid collections.    Extensive periventricular T2/FLAIR hyperintensity in keeping with advanced chronic microvascular ischemic changes.  Multiple punctate areas of susceptibility scattered throughout the cortico-subcortical regions in the supratentorial region (for  example axial series 7, image 12).    No parenchymal mass, hemorrhage, edema or recent major vascular distribution infarct.    The T2 skull base flow voids are preserved.  Bone marrow signal intensity unremarkable.    MRA:    ACAs and MCAs demonstrate no evidence of high-grade stenosis, focal occlusion, or intracranial aneurysm.    Multifocal areas of narrowing throughout the bilateral PCAs without evidence of focal occlusion.                                 Medical Decision Making:   History:   I obtained history from: EMS provider and someone other than patient.       <> Summary of History: Discussed with daughter by phone  Old Medical Records: I decided to obtain old medical records.  Old Records Summarized: records from another hospital.  Clinical Tests:   Radiological Study: Reviewed  ED Management:  89-year-old male with a history of dementia presents with reported episode of slurred speech.  Vitals here with high blood pressure.  Physical exam as above.  MRI without evidence of acute stroke.  Stroke team recommends discharge back to nursing home.  Discussed with daughter who agrees.  Etiology of reported slurred speech unclear.  No symptoms at [facility] at discharge.                             Clinical Impression:     ICD-10-CM ICD-9-CM   1. Dementia without behavioral disturbance, unspecified dementia type  F03.90 294.20                          ED Disposition Condition    Discharge Stable        ED Prescriptions     None        Follow-up Information     Follow up With Specialties Details Why Contact Info    Follow up with primary physician as soon as possible.  Call tomorrow for an appointment.        [facility] Emergency Medicine  Return to ED for worsening symptoms, inability to eat/drink, fever greater than 100.4, or any other concerns. [address]  [facility] [address] [phone]                      Level of Complexity:  High, level 5.                  Nigel Umanzor MD  12/20/20 1848       Nigel Umanzor MD  12/20/20 1858

## 2020-12-20 NOTE — ASSESSMENT & PLAN NOTE
89 yo NH dementia patient with HLD, HTN, kidney disease and Hx of GIB. Found sitting on floor in his room with word salad (normally very conversant) and not able to follow commands. NIHSS 4 LOC, aphasia and dysarthria. no rift throughout. Patient unable to give any information. Chart review from paperwork of NH reveals no antiplts or anticoagulation. No further information given by EMS to the ED Staff regarding LKN    Acute ischemic stroke versus post ictal state versus infectious/metabolic encephalopathy  Patient is a DNR and Hx of GIB  With Dementia obscuring NIHSS recommend STAT transfer [place] [facility] [place] to assess if having AIS there the neurology team can continue acute stroke algorithm within the tPA window. If MRI negative for AIS then other workup can be pursued.     Antithrombotics for secondary stroke prevention: Antiplatelets: Aspirin: 81 mg daily    Statins for secondary stroke prevention and hyperlipidemia, if present:   Statins: Atorvastatin- 40 mg daily    Aggressive risk factor modification: HTN, DM, HLD, Diet, Exercise, Obesity     Rehab efforts: The patient has been evaluated by a stroke team provider and the therapy needs have been fully considered based off the presenting complaints and exam findings. The following therapy evaluations are needed: PT evaluate and treat, OT evaluate and treat, SLP evaluate and treat    Diagnostics ordered/pending: CTA Head to assess vasculature , CTA Neck/Arch to assess vasculature, HgbA1C to assess blood glucose levels, Lipid Profile to assess cholesterol levels, MRI head without contrast to assess brain parenchyma, TTE to assess cardiac function/status     VTE prophylaxis: Heparin 5000 units SQ every 8 hours    BP parameters: dependent on image findings, for now goal < 220

## 2020-12-20 NOTE — ED TRIAGE NOTES
Pt transported from [facility] via helicopter for neuro consult.  Pt was found to have slurred speech and worsened dementia.   Pt lives in nursing home.

## 2020-12-20 NOTE — HPI
89 yo NH dementia patient with HLD, HTN, kidney disease and Hx of GIB. Found sitting on floor in his room with word salad (normally very conversant) and not able to follow commands. NIHSS 4 LOC, aphasia and dysarthria. no rift throughout. Patient unable to give any information. Chart review from paperwork of NH reveals no antiplts or anticoagulation. No further information given by EMS to the ED Staff regarding LKN

## 2020-12-20 NOTE — CARE UPDATE
FLIGHT CARE TRANSPORT NOTE     Date of Transport: 2020  : 1930  Age: 89 y.o.  Medication Dosing Weight: 63 kg  Sex: male  Race: Black or     MRN: [id number]  Time Of Patient Handoff: 16:55    ASSESSMENT/INTERVENTIONS     This patient was transported by [facility] from the ED of [facility]. The patient's overall condition remained unchanged throughout transport, with all vital signs remaining stable per the patient's current baseline. All lines, tubes, and devices remained patent and intact. The patient was transferred from the [facility] stretcher to ER bed 2 where care was transitioned to bedside [facility] without incident. The patient's Personal Belongings and OSH Chart were left with receiving clinician.     Patient transferred secondary to concern for CVA. Patient's last known normal was 14:30 today. He received no medications during transport.     Last set of vital signs at transfer of care are as follows:    /100, HR 85, RR 14, SpO2 98%         FOLLOW-UP     Call [facility], Jerry Whaley RN at [phone] for additional questions or concerns.

## 2020-12-20 NOTE — ED NOTES
Appearance:  Pt awake, alert & oriented to person, place only. .  Pt in no acute distress at present time.  Hard of hearing.  Skin:  Skin warm, dry & intact.  Mucous membranes moist.  Skin turgor normal.  Respiratory:  Respirations even, non-labored.    Neurologic:  Pt moving all extremities without difficulty.  Sensation intact.   Pt able to follow commands  Peripheral Vascular:  All peripheral pulses present.  Abdomen:  Abdomen soft, non-tender to palpation.

## 2020-12-21 NOTE — SUBJECTIVE & OBJECTIVE
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    Constipation 5/23/2014    COPD (chronic obstructive pulmonary disease)     Dementia     Erectile dysfunction     GERD (gastroesophageal reflux disease)     History of GI bleed 12/12/2016    Hyperlipidemia     Iron deficiency anemia 12/13/2016    Nuclear sclerosis 10/19/2012    Renal disorder      Past Surgical History:   Procedure Laterality Date    CATARACT EXTRACTION W/  INTRAOCULAR LENS IMPLANT  12/5/12    OD w/     COLONOSCOPY N/A 10/31/2017    Procedure: COLONOSCOPY;  Surgeon: Oswaldo Zee MD;  Location: [facility] ENDO;  Service: Endoscopy;  Laterality: N/A;    lump in back removed  2009    lump removal      head    PARS PLANA VITRECTOMY  12/11/12    OD w/ dr. tavarez    RETINAL DETACHMENT SURGERY  3/26/13    Right eye    TONSILLECTOMY       Family History   Problem Relation Age of Onset    Cancer Mother     Diabetes Mother     Hypertension Father     Stroke Father     Cancer Sister     Cancer Brother     Cataracts Paternal Grandmother     Glaucoma Paternal Grandmother     Hypertension Paternal Grandmother     Amblyopia Neg Hx     Blindness Neg Hx     Macular degeneration Neg Hx     Retinal detachment Neg Hx     Strabismus Neg Hx     Thyroid disease Neg Hx      Social History     Tobacco Use    Smoking status: Former Smoker    Smokeless tobacco: Never Used   Substance Use Topics    Alcohol use: Yes     Alcohol/week: 0.8 standard drinks     Types: 1 Standard drinks or equivalent per week     Comment: soically    Drug use: No     Review of patient's allergies indicates:   Allergen Reactions    Naproxen      Other reaction(s): bleeding    Naproxen sodium Other (See Comments)     Other reaction(s): stomch bleeding    Ticlopidine Other (See Comments)     Other reaction(s):  extreme bleeding       Medications: I have reviewed the current medication administration record.    (Not in a hospital admission)      Review of Systems   Constitutional: Negative for fever.   HENT: Positive for hearing loss.    Respiratory: Negative for cough.    Cardiovascular: Negative for chest pain.   Gastrointestinal: Negative for nausea and vomiting.   Neurological: Negative for dizziness, facial asymmetry, speech difficulty, weakness, numbness and headaches.   Psychiatric/Behavioral: Positive for confusion. Negative for agitation and behavioral problems.     Objective:     Vital Signs (Most Recent):  Temp: 97.9 °F (36.6 °C) (12/20/20 1709)  Pulse: 87 (12/20/20 1744)  Resp: 16 (12/20/20 1744)  BP: (!) 170/96 (12/20/20 1744)  SpO2: 96 % (12/20/20 1744)    Vital Signs Range (Last 24H):  Temp:  [97.9 °F (36.6 °C)]   Pulse:  []   Resp:  [14-18]   BP: (156-180)/()   SpO2:  [93 %-98 %]     Physical Exam  Vitals signs reviewed.   Constitutional:       General: He is not in acute distress.     Appearance: He is well-developed.   HENT:      Head: Normocephalic and atraumatic.   Cardiovascular:      Rate and Rhythm: Normal rate.   Pulmonary:      Effort: Pulmonary effort is normal. No respiratory distress.   Skin:     General: Skin is warm and dry.   Neurological:      Mental Status: He is alert.         Neurological Exam:   LOC: alert  Attention Span: Good   Language: No aphasia  Articulation: No dysarthria  Orientation: Not oriented to time  Visual Fields: Full  EOM (CN III, IV, VI): Full/intact  Facial Movement (CN VII): Symmetric facial expression    Motor: Arm left  Normal 5/5  Leg left  Normal 5/5  Arm right  Normal 5/5  Leg right Normal 5/5  Sensation: Intact to light touch, temperature and vibration  Tone: Normal tone throughout      Laboratory:  CMP:   Recent Labs   Lab 12/20/20  1527   CALCIUM 9.8   ALBUMIN 4.6   PROT 8.9*      K 4.7   CO2 25      BUN 18   CREATININE 1.25   ALKPHOS 84    ALT 24   AST 36   BILITOT 1.0     CBC:   Recent Labs   Lab 12/20/20  1527   WBC 6.62   RBC 5.79   HGB 16.7   HCT 52.7   *   MCV 91   MCH 28.8   MCHC 31.7*     Lipid Panel: No results for input(s): CHOL, LDLCALC, HDL, TRIG in the last 168 hours.  Coagulation:   Recent Labs   Lab 12/20/20  1527   APTT 24.7     Hgb A1C: No results for input(s): HGBA1C in the last 168 hours.  TSH: No results for input(s): TSH in the last 168 hours.    Diagnostic Results:      Brain imaging/Vessel Imaging:  MRI Brain Ischemic Protocol. Date: 12/20/20  No LVO  No evidence of DWI changes to suggest acute stroke    CT Head. Date: 12/20/20  1.  Negative for acute intracranial process. Negative for hemorrhage, or skull fracture.     2.  Cerebral atrophy noted.  Intracranial atherosclerotic disease noted.  Small vessel ischemic changes noted.  Bilateral basal ganglia lacunar infarcts.     3.  Stable findings as noted above.     All CT scans at this facility are performed  using dose modulation techniques as appropriate to performed exam including the following:  automated exposure control; adjustment of mA and/or kV according to the patients size (this includes techniques or standardized protocols for targeted exams where dose is matched to indication/reason for exam: i.e. extremities or head);  iterative reconstruction technique.    Cardiac Evaluation:   none

## 2020-12-21 NOTE — HPI
Toby Green is a 89 y.o. male with PMHx of HLD, HTN, DM, GIB, and dementia who presented to [facility] with speech changes. Patient was last known normal at 1430. He was found in his room at his nursing home with word salad and was not following commands. At baseline, he is conversant. He was seen via telemedicine, due to difficultly obtaining accurate NIH with dementia history it was unclear if patient's symptoms were cerebrovascular in origin. Patient also has history of GIB and is a DNR, tPA deferred until MRI completed. Patient transferred to [facility] for STAT MRI. On arrival, patient denies neurologic symptoms, specifically denies speech difficulty. He states he ambulates independently at baseline.

## 2020-12-21 NOTE — ED NOTES
Pt incontinent of urine.  Perineal care provided.  Clean linens applied.  Pt repositioned for comfort.  Warm blankets provided.

## 2020-12-21 NOTE — CONSULTS
[facility]  Vascular Neurology  Comprehensive Stroke Center  Consult Note    Consults  Assessment/Plan:     Patient is a 89 y.o. year old male with:    * Difficulty with speech  Toby Green is a 89 y.o. male with PMHx of HLD, HTN, DM, GIB, and dementia who presented to [facility] with speech changes. He was seen via telemedicine, due to difficultly obtaining accurate NIH with dementia history it was unclear if patient's symptoms were cerebrovascular in origin. Patient also has history of GIB and is a DNR, tPA deferred until MRI completed. [place] [facility] [place] MRI on arrival negative for LVO, no evidence of DWI changes to suggest acute infarct. tPA was not administered.    During exam, patient noted to be hard of hearing. Suspect he may have been having trouble following commands due to poor hearing, this could also explain his inappropriate responses appearing as word salad. Patient safe for discharge back to nursing home. Continue home pravastatin for stroke prevention.        STROKE DOCUMENTATION     Acute Stroke Times   Last Known Normal Date: 12/20/20  Last Known Normal Time: 1430  Symptom Onset Date: 12/20/20  Symptom Onset Time: 1430  Stroke Team Called Date: 12/20/20  Stroke Team Called Time: 1654  Stroke Team Arrival Date: 12/20/20  Stroke Team Arrival Time: 1654  CT Interpretation Time: (interpretted at OSH via telemedicine)  Decision to Treat Time for Alteplase: 1724  Decision to Treat Time for IR: 1724    NIH Scale:  1a. Level of Consciousness: 0-->Alert, keenly responsive  1b. LOC Questions: 1-->Answers one question correctly  1c. LOC Commands: 0-->Performs both tasks correctly  2. Best Gaze: 0-->Normal  3. Visual: 0-->No visual loss  4. Facial Palsy: 0-->Normal symmetrical movements  5a. Motor Arm, Left: 0-->No drift, limb holds 90 (or 45) degrees for full 10 secs  5b. Motor Arm, Right: 0-->No drift, limb holds 90 (or 45) degrees for full 10 secs  6a.  Motor Leg, Left: 0-->No drift, leg holds 30 degree position for full 5 secs  6b. Motor Leg, Right: 0-->No drift, leg holds 30 degree position for full 5 secs  7. Limb Ataxia: 0-->Absent  8. Sensory: 0-->Normal, no sensory loss  9. Best Language: 0-->No aphasia, normal  10. Dysarthria: 0-->Normal  11. Extinction and Inattention (formerly Neglect): 0-->No abnormality  Total (NIH Stroke Scale): 1    Modified [place] Score: 3  [place] Coma Scale:    ABCD2 Score:    [id number] Score:   HAS -BLED Score:   ICH Score:   Hunt & Carrera Classification:       Thrombolysis Candidate? No, STAT MRI ischemic protocol without DWI changes    Delays to Thrombolysis?  No    Interventional Revascularization Candidate?   Is the patient eligible for mechanical endovascular reperfusion (KEYANNA)?  No; No large vessel occlusion      Hemorrhagic change of an Ischemic Stroke: Does this patient have an ischemic stroke with hemorrhagic changes? No     Subjective:     History of Present Illness:  Toby Green is a 89 y.o. male with PMHx of HLD, HTN, DM, GIB, and dementia who presented to [facility] with speech changes. Patient was last known normal at 1430. He was found in his room at his nursing home with word salad and was not following commands. At baseline, he is conversant. He was seen via telemedicine, due to difficultly obtaining accurate NIH with dementia history it was unclear if patient's symptoms were cerebrovascular in origin. Patient also has history of GIB and is a DNR, tPA deferred until MRI completed. [place] [facility] [place] On arrival, patient denies neurologic symptoms, specifically denies speech difficulty. He states he ambulates independently at baseline.         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    Constipation 5/23/2014    COPD (chronic  obstructive pulmonary disease)     Dementia     Erectile dysfunction     GERD (gastroesophageal reflux disease)     History of GI bleed 12/12/2016    Hyperlipidemia     Iron deficiency anemia 12/13/2016    Nuclear sclerosis 10/19/2012    Renal disorder      Past Surgical History:   Procedure Laterality Date    CATARACT EXTRACTION W/  INTRAOCULAR LENS IMPLANT  12/5/12    OD w/     COLONOSCOPY N/A 10/31/2017    Procedure: COLONOSCOPY;  Surgeon: Oswaldo Zee MD;  Location: [facility] ENDO;  Service: Endoscopy;  Laterality: N/A;    lump in back removed  2009    lump removal      head    PARS PLANA VITRECTOMY  12/11/12    OD w/ dr. tavarez    RETINAL DETACHMENT SURGERY  3/26/13    Right eye    TONSILLECTOMY       Family History   Problem Relation Age of Onset    Cancer Mother     Diabetes Mother     Hypertension Father     Stroke Father     Cancer Sister     Cancer Brother     Cataracts Paternal Grandmother     Glaucoma Paternal Grandmother     Hypertension Paternal Grandmother     Amblyopia Neg Hx     Blindness Neg Hx     Macular degeneration Neg Hx     Retinal detachment Neg Hx     Strabismus Neg Hx     Thyroid disease Neg Hx      Social History     Tobacco Use    Smoking status: Former Smoker    Smokeless tobacco: Never Used   Substance Use Topics    Alcohol use: Yes     Alcohol/week: 0.8 standard drinks     Types: 1 Standard drinks or equivalent per week     Comment: soically    Drug use: No     Review of patient's allergies indicates:   Allergen Reactions    Naproxen      Other reaction(s): bleeding    Naproxen sodium Other (See Comments)     Other reaction(s): stomch bleeding    Ticlopidine Other (See Comments)     Other reaction(s): extreme bleeding       Medications: I have reviewed the current medication administration record.    (Not in a hospital admission)      Review of Systems   Constitutional: Negative for fever.   HENT: Positive for hearing loss.     Respiratory: Negative for cough.    Cardiovascular: Negative for chest pain.   Gastrointestinal: Negative for nausea and vomiting.   Neurological: Negative for dizziness, facial asymmetry, speech difficulty, weakness, numbness and headaches.   Psychiatric/Behavioral: Positive for confusion. Negative for agitation and behavioral problems.     Objective:     Vital Signs (Most Recent):  Temp: 97.9 °F (36.6 °C) (12/20/20 1709)  Pulse: 87 (12/20/20 1744)  Resp: 16 (12/20/20 1744)  BP: (!) 170/96 (12/20/20 1744)  SpO2: 96 % (12/20/20 1744)    Vital Signs Range (Last 24H):  Temp:  [97.9 °F (36.6 °C)]   Pulse:  []   Resp:  [14-18]   BP: (156-180)/()   SpO2:  [93 %-98 %]     Physical Exam  Vitals signs reviewed.   Constitutional:       General: He is not in acute distress.     Appearance: He is well-developed.   HENT:      Head: Normocephalic and atraumatic.   Cardiovascular:      Rate and Rhythm: Normal rate.   Pulmonary:      Effort: Pulmonary effort is normal. No respiratory distress.   Skin:     General: Skin is warm and dry.   Neurological:      Mental Status: He is alert.         Neurological Exam:   LOC: alert  Attention Span: Good   Language: No aphasia  Articulation: No dysarthria  Orientation: Not oriented to time  Visual Fields: Full  EOM (CN III, IV, VI): Full/intact  Facial Movement (CN VII): Symmetric facial expression    Motor: Arm left  Normal 5/5  Leg left  Normal 5/5  Arm right  Normal 5/5  Leg right Normal 5/5  Sensation: Intact to light touch, temperature and vibration  Tone: Normal tone throughout      Laboratory:  CMP:   Recent Labs   Lab 12/20/20  1527   CALCIUM 9.8   ALBUMIN 4.6   PROT 8.9*      K 4.7   CO2 25      BUN 18   CREATININE 1.25   ALKPHOS 84   ALT 24   AST 36   BILITOT 1.0     CBC:   Recent Labs   Lab 12/20/20  1527   WBC 6.62   RBC 5.79   HGB 16.7   HCT 52.7   *   MCV 91   MCH 28.8   MCHC 31.7*     Lipid Panel: No results for input(s): CHOL, LDLCALC, HDL,  TRIG in the last 168 hours.  Coagulation:   Recent Labs   Lab 12/20/20  1527   APTT 24.7     Hgb A1C: No results for input(s): HGBA1C in the last 168 hours.  TSH: No results for input(s): TSH in the last 168 hours.    Diagnostic Results:      Brain imaging/Vessel Imaging:  MRI Brain Ischemic Protocol. Date: 12/20/20  No LVO  No evidence of DWI changes to suggest acute stroke    CT Head. Date: 12/20/20  1.  Negative for acute intracranial process. Negative for hemorrhage, or skull fracture.     2.  Cerebral atrophy noted.  Intracranial atherosclerotic disease noted.  Small vessel ischemic changes noted.  Bilateral basal ganglia lacunar infarcts.     3.  Stable findings as noted above.     All CT scans at this facility are performed  using dose modulation techniques as appropriate to performed exam including the following:  automated exposure control; adjustment of mA and/or kV according to the patients size (this includes techniques or standardized protocols for targeted exams where dose is matched to indication/reason for exam: i.e. extremities or head);  iterative reconstruction technique.    Cardiac Evaluation:   none      Maura Sterling PA-C  Comprehensive Stroke Center  Department of Vascular Neurology   [facility]

## 2020-12-21 NOTE — ASSESSMENT & PLAN NOTE
Toby Green is a 89 y.o. male with PMHx of HLD, HTN, DM, GIB, and dementia who presented to [facility] with speech changes. He was seen via telemedicine, due to difficultly obtaining accurate NIH with dementia history it was unclear if patient's symptoms were cerebrovascular in origin. Patient also has history of GIB and is a DNR, tPA deferred until MRI completed. Patient transferred to [facility] for STAT MRI. MRI on arrival negative for LVO, no evidence of DWI changes to suggest acute infarct. tPA was not administered.    During exam, patient noted to be hard of hearing. Suspect he may have been having trouble following commands due to poor hearing, this could also explain his inappropriate responses appearing as word salad. Patient safe for discharge back to nursing home. Continue home pravastatin for stroke prevention.

## 2020-12-23 ENCOUNTER — OUTSIDE PLACE OF SERVICE (OUTPATIENT)
Dept: FAMILY MEDICINE | Facility: CLINIC | Age: 85
End: 2020-12-23
Payer: MEDICARE

## 2020-12-23 PROCEDURE — 99499 NO LOS: ICD-10-PCS | Mod: ,,, | Performed by: FAMILY MEDICINE

## 2020-12-23 PROCEDURE — 99499 UNLISTED E&M SERVICE: CPT | Mod: ,,, | Performed by: FAMILY MEDICINE

## 2021-01-01 NOTE — TELEPHONE ENCOUNTER
----- Message from Kellie Palencia sent at 9/30/2020 10:46 AM CDT -----  Regarding: call back  Type:  Needs Medical Advice    Who Called: case management   Reason: patient needs an earlier appt time for a hospital follow up within two -three weeks     Would the patient rather a call back or a response via [facility]? Call back   Best Call Back Number: [phone]  Additional Information: n/a      
Spoke to pt's daughter, Kerri and scheduled a Hospital follow-up for a Psychiatry Referral.   
I will SWITCH the dose or number of times a day I take the medications listed below when I get home from the hospital:  None

## 2021-01-06 ENCOUNTER — OUTSIDE PLACE OF SERVICE (OUTPATIENT)
Dept: FAMILY MEDICINE | Facility: CLINIC | Age: 86
End: 2021-01-06
Payer: MEDICARE

## 2021-01-06 PROCEDURE — 99307 SBSQ NF CARE SF MDM 10: CPT | Mod: ,,, | Performed by: FAMILY MEDICINE

## 2021-01-06 PROCEDURE — 99307 PR NURSING FAC CARE, SUBSEQ, IMPROVING: ICD-10-PCS | Mod: ,,, | Performed by: FAMILY MEDICINE

## 2021-01-14 ENCOUNTER — PATIENT MESSAGE (OUTPATIENT)
Dept: FAMILY MEDICINE | Facility: CLINIC | Age: 86
End: 2021-01-14

## 2021-01-20 ENCOUNTER — OUTSIDE PLACE OF SERVICE (OUTPATIENT)
Dept: FAMILY MEDICINE | Facility: CLINIC | Age: 86
End: 2021-01-20
Payer: MEDICARE

## 2021-01-20 PROCEDURE — 99307 SBSQ NF CARE SF MDM 10: CPT | Mod: ,,, | Performed by: FAMILY MEDICINE

## 2021-01-20 PROCEDURE — 99307 PR NURSING FAC CARE, SUBSEQ, IMPROVING: ICD-10-PCS | Mod: ,,, | Performed by: FAMILY MEDICINE

## 2021-03-11 ENCOUNTER — OUTSIDE PLACE OF SERVICE (OUTPATIENT)
Dept: FAMILY MEDICINE | Facility: CLINIC | Age: 86
End: 2021-03-11
Payer: MEDICARE

## 2021-03-11 PROCEDURE — 99308 SBSQ NF CARE LOW MDM 20: CPT | Mod: ,,, | Performed by: FAMILY MEDICINE

## 2021-03-11 PROCEDURE — 99308 PR NURSING FAC CARE, SUBSEQ, MINOR COMPLIC: ICD-10-PCS | Mod: ,,, | Performed by: FAMILY MEDICINE

## 2021-05-01 ENCOUNTER — HOSPITAL ENCOUNTER (OUTPATIENT)
Facility: HOSPITAL | Age: 86
Discharge: HOME | End: 2021-05-02
Attending: EMERGENCY MEDICINE | Admitting: HOSPITALIST
Payer: MEDICARE

## 2021-05-01 DIAGNOSIS — Z71.89 ADVANCED CARE PLANNING/COUNSELING DISCUSSION: ICD-10-CM

## 2021-05-01 DIAGNOSIS — K92.2 LOWER GI BLEED: ICD-10-CM

## 2021-05-01 DIAGNOSIS — K92.2 GIB (GASTROINTESTINAL BLEEDING): Primary | ICD-10-CM

## 2021-05-01 LAB
ABO + RH BLD: NORMAL
ALBUMIN SERPL BCP-MCNC: 3.2 G/DL (ref 3.5–5.2)
ALP SERPL-CCNC: 57 U/L (ref 55–135)
ALT SERPL W/O P-5'-P-CCNC: 14 U/L (ref 10–44)
ANION GAP SERPL CALC-SCNC: 9 MMOL/L (ref 8–16)
AST SERPL-CCNC: 18 U/L (ref 10–40)
BASOPHILS # BLD AUTO: 0.03 K/UL (ref 0–0.2)
BASOPHILS # BLD AUTO: 0.04 K/UL (ref 0–0.2)
BASOPHILS NFR BLD: 0.4 % (ref 0–1.9)
BASOPHILS NFR BLD: 0.5 % (ref 0–1.9)
BILIRUB SERPL-MCNC: 0.4 MG/DL (ref 0.1–1)
BILIRUB UR QL STRIP: NEGATIVE
BLD GP AB SCN CELLS X3 SERPL QL: NORMAL
BUN SERPL-MCNC: 25 MG/DL (ref 10–30)
CALCIUM SERPL-MCNC: 9.1 MG/DL (ref 8.7–10.5)
CHLORIDE SERPL-SCNC: 106 MMOL/L (ref 95–110)
CLARITY UR: CLEAR
CO2 SERPL-SCNC: 27 MMOL/L (ref 23–29)
COLOR UR: YELLOW
CREAT SERPL-MCNC: 1.3 MG/DL (ref 0.5–1.4)
CTP QC/QA: YES
DIFFERENTIAL METHOD: ABNORMAL
DIFFERENTIAL METHOD: ABNORMAL
EOSINOPHIL # BLD AUTO: 0.2 K/UL (ref 0–0.5)
EOSINOPHIL # BLD AUTO: 0.2 K/UL (ref 0–0.5)
EOSINOPHIL NFR BLD: 2.1 % (ref 0–8)
EOSINOPHIL NFR BLD: 2.1 % (ref 0–8)
ERYTHROCYTE [DISTWIDTH] IN BLOOD BY AUTOMATED COUNT: 13.3 % (ref 11.5–14.5)
ERYTHROCYTE [DISTWIDTH] IN BLOOD BY AUTOMATED COUNT: 13.5 % (ref 11.5–14.5)
EST. GFR  (AFRICAN AMERICAN): 55 ML/MIN/1.73 M^2
EST. GFR  (NON AFRICAN AMERICAN): 48 ML/MIN/1.73 M^2
GLUCOSE SERPL-MCNC: 97 MG/DL (ref 70–110)
GLUCOSE UR QL STRIP: NEGATIVE
HCT VFR BLD AUTO: 39.9 % (ref 40–54)
HCT VFR BLD AUTO: 42.1 % (ref 40–54)
HGB BLD-MCNC: 13.2 G/DL (ref 14–18)
HGB BLD-MCNC: 13.8 G/DL (ref 14–18)
HGB UR QL STRIP: NEGATIVE
IMM GRANULOCYTES # BLD AUTO: 0.03 K/UL (ref 0–0.04)
IMM GRANULOCYTES # BLD AUTO: 0.03 K/UL (ref 0–0.04)
IMM GRANULOCYTES NFR BLD AUTO: 0.3 % (ref 0–0.5)
IMM GRANULOCYTES NFR BLD AUTO: 0.4 % (ref 0–0.5)
INR PPP: 1 (ref 0.8–1.2)
KETONES UR QL STRIP: NEGATIVE
LEUKOCYTE ESTERASE UR QL STRIP: NEGATIVE
LYMPHOCYTES # BLD AUTO: 0.8 K/UL (ref 1–4.8)
LYMPHOCYTES # BLD AUTO: 1.1 K/UL (ref 1–4.8)
LYMPHOCYTES NFR BLD: 10.1 % (ref 18–48)
LYMPHOCYTES NFR BLD: 12 % (ref 18–48)
MCH RBC QN AUTO: 29.5 PG (ref 27–31)
MCH RBC QN AUTO: 30.4 PG (ref 27–31)
MCHC RBC AUTO-ENTMCNC: 32.8 G/DL (ref 32–36)
MCHC RBC AUTO-ENTMCNC: 33.1 G/DL (ref 32–36)
MCV RBC AUTO: 89 FL (ref 82–98)
MCV RBC AUTO: 93 FL (ref 82–98)
MONOCYTES # BLD AUTO: 0.5 K/UL (ref 0.3–1)
MONOCYTES # BLD AUTO: 0.6 K/UL (ref 0.3–1)
MONOCYTES NFR BLD: 6.5 % (ref 4–15)
MONOCYTES NFR BLD: 6.8 % (ref 4–15)
NEUTROPHILS # BLD AUTO: 6.4 K/UL (ref 1.8–7.7)
NEUTROPHILS # BLD AUTO: 7 K/UL (ref 1.8–7.7)
NEUTROPHILS NFR BLD: 78.6 % (ref 38–73)
NEUTROPHILS NFR BLD: 80.2 % (ref 38–73)
NITRITE UR QL STRIP: NEGATIVE
NRBC BLD-RTO: 0 /100 WBC
NRBC BLD-RTO: 0 /100 WBC
PH UR STRIP: 6 [PH] (ref 5–8)
PLATELET # BLD AUTO: 130 K/UL (ref 150–450)
PLATELET # BLD AUTO: 142 K/UL (ref 150–450)
PMV BLD AUTO: 11.9 FL (ref 9.2–12.9)
PMV BLD AUTO: 12.2 FL (ref 9.2–12.9)
POTASSIUM SERPL-SCNC: 4.6 MMOL/L (ref 3.5–5.1)
PROT SERPL-MCNC: 7 G/DL (ref 6–8.4)
PROT UR QL STRIP: NEGATIVE
PROTHROMBIN TIME: 10.7 SEC (ref 9–12.5)
RBC # BLD AUTO: 4.47 M/UL (ref 4.6–6.2)
RBC # BLD AUTO: 4.54 M/UL (ref 4.6–6.2)
SARS-COV-2 RDRP RESP QL NAA+PROBE: NEGATIVE
SODIUM SERPL-SCNC: 142 MMOL/L (ref 136–145)
SP GR UR STRIP: 1.02 (ref 1–1.03)
URN SPEC COLLECT METH UR: NORMAL
UROBILINOGEN UR STRIP-ACNC: NEGATIVE EU/DL
WBC # BLD AUTO: 7.94 K/UL (ref 3.9–12.7)
WBC # BLD AUTO: 8.88 K/UL (ref 3.9–12.7)

## 2021-05-01 PROCEDURE — 93005 ELECTROCARDIOGRAM TRACING: CPT

## 2021-05-01 PROCEDURE — 93010 ELECTROCARDIOGRAM REPORT: CPT | Mod: ,,, | Performed by: INTERNAL MEDICINE

## 2021-05-01 PROCEDURE — U0002 COVID-19 LAB TEST NON-CDC: HCPCS | Performed by: EMERGENCY MEDICINE

## 2021-05-01 PROCEDURE — 63600175 PHARM REV CODE 636 W HCPCS: Performed by: HOSPITALIST

## 2021-05-01 PROCEDURE — 25000003 PHARM REV CODE 250: Performed by: HOSPITALIST

## 2021-05-01 PROCEDURE — 85610 PROTHROMBIN TIME: CPT | Performed by: EMERGENCY MEDICINE

## 2021-05-01 PROCEDURE — 25500020 PHARM REV CODE 255: Performed by: EMERGENCY MEDICINE

## 2021-05-01 PROCEDURE — 85025 COMPLETE CBC W/AUTO DIFF WBC: CPT | Performed by: EMERGENCY MEDICINE

## 2021-05-01 PROCEDURE — C9113 INJ PANTOPRAZOLE SODIUM, VIA: HCPCS | Performed by: HOSPITALIST

## 2021-05-01 PROCEDURE — G0378 HOSPITAL OBSERVATION PER HR: HCPCS

## 2021-05-01 PROCEDURE — 99285 EMERGENCY DEPT VISIT HI MDM: CPT | Mod: 25

## 2021-05-01 PROCEDURE — 85025 COMPLETE CBC W/AUTO DIFF WBC: CPT | Mod: 91 | Performed by: HOSPITALIST

## 2021-05-01 PROCEDURE — 96374 THER/PROPH/DIAG INJ IV PUSH: CPT

## 2021-05-01 PROCEDURE — 81003 URINALYSIS AUTO W/O SCOPE: CPT | Performed by: EMERGENCY MEDICINE

## 2021-05-01 PROCEDURE — 86900 BLOOD TYPING SEROLOGIC ABO: CPT | Performed by: EMERGENCY MEDICINE

## 2021-05-01 PROCEDURE — 80053 COMPREHEN METABOLIC PANEL: CPT | Performed by: EMERGENCY MEDICINE

## 2021-05-01 PROCEDURE — 93010 EKG 12-LEAD: ICD-10-PCS | Mod: ,,, | Performed by: INTERNAL MEDICINE

## 2021-05-01 RX ORDER — RISPERIDONE 0.25 MG/1
0.25 TABLET ORAL DAILY
COMMUNITY

## 2021-05-01 RX ORDER — FINASTERIDE 5 MG/1
5 TABLET, FILM COATED ORAL DAILY
Status: DISCONTINUED | OUTPATIENT
Start: 2021-05-02 | End: 2021-05-02 | Stop reason: HOSPADM

## 2021-05-01 RX ORDER — DONEPEZIL HYDROCHLORIDE 5 MG/1
10 TABLET, FILM COATED ORAL NIGHTLY
Status: DISCONTINUED | OUTPATIENT
Start: 2021-05-01 | End: 2021-05-02 | Stop reason: HOSPADM

## 2021-05-01 RX ORDER — METOPROLOL TARTRATE 25 MG/1
12.5 TABLET, FILM COATED ORAL 2 TIMES DAILY
COMMUNITY

## 2021-05-01 RX ORDER — RISPERIDONE 1 MG/1
1 TABLET, ORALLY DISINTEGRATING ORAL NIGHTLY
Status: DISCONTINUED | OUTPATIENT
Start: 2021-05-01 | End: 2021-05-02 | Stop reason: HOSPADM

## 2021-05-01 RX ORDER — POLYETHYLENE GLYCOL 3350 17 G/17G
17 POWDER, FOR SOLUTION ORAL DAILY PRN
COMMUNITY

## 2021-05-01 RX ORDER — RISPERIDONE 0.5 MG/1
0.5 TABLET, ORALLY DISINTEGRATING ORAL DAILY
Status: DISCONTINUED | OUTPATIENT
Start: 2021-05-02 | End: 2021-05-02 | Stop reason: HOSPADM

## 2021-05-01 RX ORDER — PRAVASTATIN SODIUM 40 MG/1
80 TABLET ORAL NIGHTLY
Status: DISCONTINUED | OUTPATIENT
Start: 2021-05-01 | End: 2021-05-02 | Stop reason: HOSPADM

## 2021-05-01 RX ORDER — RISPERIDONE 1 MG/1
1 TABLET ORAL NIGHTLY
COMMUNITY

## 2021-05-01 RX ORDER — MELATONIN 5 MG
5 CAPSULE ORAL NIGHTLY
COMMUNITY

## 2021-05-01 RX ORDER — TAMSULOSIN HYDROCHLORIDE 0.4 MG/1
1 CAPSULE ORAL NIGHTLY
Status: DISCONTINUED | OUTPATIENT
Start: 2021-05-01 | End: 2021-05-02 | Stop reason: HOSPADM

## 2021-05-01 RX ORDER — TALC
6 POWDER (GRAM) TOPICAL NIGHTLY
Status: DISCONTINUED | OUTPATIENT
Start: 2021-05-01 | End: 2021-05-02 | Stop reason: HOSPADM

## 2021-05-01 RX ORDER — METOPROLOL TARTRATE 25 MG/1
12.5 TABLET ORAL 2 TIMES DAILY
Status: DISCONTINUED | OUTPATIENT
Start: 2021-05-01 | End: 2021-05-02 | Stop reason: HOSPADM

## 2021-05-01 RX ORDER — MIRTAZAPINE 30 MG/1
30 TABLET, FILM COATED ORAL NIGHTLY
COMMUNITY

## 2021-05-01 RX ORDER — MIRTAZAPINE 7.5 MG/1
30 TABLET, FILM COATED ORAL NIGHTLY
Status: DISCONTINUED | OUTPATIENT
Start: 2021-05-01 | End: 2021-05-02 | Stop reason: HOSPADM

## 2021-05-01 RX ORDER — PANTOPRAZOLE SODIUM 40 MG/10ML
40 INJECTION, POWDER, LYOPHILIZED, FOR SOLUTION INTRAVENOUS 2 TIMES DAILY
Status: DISCONTINUED | OUTPATIENT
Start: 2021-05-01 | End: 2021-05-02 | Stop reason: HOSPADM

## 2021-05-01 RX ORDER — CLONIDINE HYDROCHLORIDE 0.1 MG/1
0.1 TABLET ORAL EVERY 6 HOURS PRN
Status: DISCONTINUED | OUTPATIENT
Start: 2021-05-01 | End: 2021-05-02 | Stop reason: HOSPADM

## 2021-05-01 RX ADMIN — IOHEXOL 75 ML: 350 INJECTION, SOLUTION INTRAVENOUS at 04:05

## 2021-05-01 RX ADMIN — PRAVASTATIN SODIUM 80 MG: 40 TABLET ORAL at 08:05

## 2021-05-01 RX ADMIN — RISPERIDONE 1 MG: 1 TABLET, ORALLY DISINTEGRATING ORAL at 08:05

## 2021-05-01 RX ADMIN — PANTOPRAZOLE SODIUM 40 MG: 40 INJECTION, POWDER, FOR SOLUTION INTRAVENOUS at 08:05

## 2021-05-01 RX ADMIN — MIRTAZAPINE 30 MG: 7.5 TABLET ORAL at 08:05

## 2021-05-01 RX ADMIN — TAMSULOSIN HYDROCHLORIDE 0.4 MG: 0.4 CAPSULE ORAL at 08:05

## 2021-05-01 RX ADMIN — MELATONIN TAB 3 MG 6 MG: 3 TAB at 08:05

## 2021-05-01 RX ADMIN — DONEPEZIL HYDROCHLORIDE 10 MG: 5 TABLET, FILM COATED ORAL at 08:05

## 2021-05-01 RX ADMIN — METOPROLOL TARTRATE 12.5 MG: 25 TABLET, FILM COATED ORAL at 08:05

## 2021-05-02 VITALS
RESPIRATION RATE: 18 BRPM | HEART RATE: 54 BPM | DIASTOLIC BLOOD PRESSURE: 65 MMHG | WEIGHT: 124.56 LBS | HEIGHT: 72 IN | OXYGEN SATURATION: 97 % | BODY MASS INDEX: 16.87 KG/M2 | SYSTOLIC BLOOD PRESSURE: 155 MMHG | TEMPERATURE: 98 F

## 2021-05-02 PROBLEM — Z51.5 COMFORT MEASURES ONLY STATUS: Status: ACTIVE | Noted: 2021-05-02

## 2021-05-02 LAB
BASOPHILS # BLD AUTO: 0.04 K/UL (ref 0–0.2)
BASOPHILS # BLD AUTO: 0.06 K/UL (ref 0–0.2)
BASOPHILS NFR BLD: 0.7 % (ref 0–1.9)
BASOPHILS NFR BLD: 1 % (ref 0–1.9)
DIFFERENTIAL METHOD: ABNORMAL
DIFFERENTIAL METHOD: ABNORMAL
EOSINOPHIL # BLD AUTO: 0.2 K/UL (ref 0–0.5)
EOSINOPHIL # BLD AUTO: 0.3 K/UL (ref 0–0.5)
EOSINOPHIL NFR BLD: 4.1 % (ref 0–8)
EOSINOPHIL NFR BLD: 4.3 % (ref 0–8)
ERYTHROCYTE [DISTWIDTH] IN BLOOD BY AUTOMATED COUNT: 13.3 % (ref 11.5–14.5)
ERYTHROCYTE [DISTWIDTH] IN BLOOD BY AUTOMATED COUNT: 13.4 % (ref 11.5–14.5)
HCT VFR BLD AUTO: 38.7 % (ref 40–54)
HCT VFR BLD AUTO: 40.6 % (ref 40–54)
HGB BLD-MCNC: 12.8 G/DL (ref 14–18)
HGB BLD-MCNC: 13.3 G/DL (ref 14–18)
IMM GRANULOCYTES # BLD AUTO: 0.01 K/UL (ref 0–0.04)
IMM GRANULOCYTES # BLD AUTO: 0.01 K/UL (ref 0–0.04)
IMM GRANULOCYTES NFR BLD AUTO: 0.2 % (ref 0–0.5)
IMM GRANULOCYTES NFR BLD AUTO: 0.2 % (ref 0–0.5)
LYMPHOCYTES # BLD AUTO: 1.1 K/UL (ref 1–4.8)
LYMPHOCYTES # BLD AUTO: 1.4 K/UL (ref 1–4.8)
LYMPHOCYTES NFR BLD: 19 % (ref 18–48)
LYMPHOCYTES NFR BLD: 23 % (ref 18–48)
MCH RBC QN AUTO: 30 PG (ref 27–31)
MCH RBC QN AUTO: 30.2 PG (ref 27–31)
MCHC RBC AUTO-ENTMCNC: 32.8 G/DL (ref 32–36)
MCHC RBC AUTO-ENTMCNC: 33.1 G/DL (ref 32–36)
MCV RBC AUTO: 91 FL (ref 82–98)
MCV RBC AUTO: 92 FL (ref 82–98)
MONOCYTES # BLD AUTO: 0.5 K/UL (ref 0.3–1)
MONOCYTES # BLD AUTO: 0.7 K/UL (ref 0.3–1)
MONOCYTES NFR BLD: 11.1 % (ref 4–15)
MONOCYTES NFR BLD: 8.6 % (ref 4–15)
NEUTROPHILS # BLD AUTO: 3.7 K/UL (ref 1.8–7.7)
NEUTROPHILS # BLD AUTO: 4 K/UL (ref 1.8–7.7)
NEUTROPHILS NFR BLD: 60.4 % (ref 38–73)
NEUTROPHILS NFR BLD: 67.4 % (ref 38–73)
NRBC BLD-RTO: 0 /100 WBC
NRBC BLD-RTO: 0 /100 WBC
PLATELET # BLD AUTO: 128 K/UL (ref 150–450)
PLATELET # BLD AUTO: 132 K/UL (ref 150–450)
PMV BLD AUTO: 11.2 FL (ref 9.2–12.9)
PMV BLD AUTO: 11.4 FL (ref 9.2–12.9)
RBC # BLD AUTO: 4.26 M/UL (ref 4.6–6.2)
RBC # BLD AUTO: 4.41 M/UL (ref 4.6–6.2)
WBC # BLD AUTO: 5.9 K/UL (ref 3.9–12.7)
WBC # BLD AUTO: 6.05 K/UL (ref 3.9–12.7)

## 2021-05-02 PROCEDURE — G0378 HOSPITAL OBSERVATION PER HR: HCPCS

## 2021-05-02 PROCEDURE — C9113 INJ PANTOPRAZOLE SODIUM, VIA: HCPCS | Performed by: HOSPITALIST

## 2021-05-02 PROCEDURE — 96376 TX/PRO/DX INJ SAME DRUG ADON: CPT

## 2021-05-02 PROCEDURE — 85025 COMPLETE CBC W/AUTO DIFF WBC: CPT | Performed by: HOSPITALIST

## 2021-05-02 PROCEDURE — 25000003 PHARM REV CODE 250: Performed by: HOSPITALIST

## 2021-05-02 PROCEDURE — 36415 COLL VENOUS BLD VENIPUNCTURE: CPT | Performed by: HOSPITALIST

## 2021-05-02 PROCEDURE — 63600175 PHARM REV CODE 636 W HCPCS: Performed by: HOSPITALIST

## 2021-05-02 PROCEDURE — 94761 N-INVAS EAR/PLS OXIMETRY MLT: CPT

## 2021-05-02 RX ADMIN — THERA TABS 1 TABLET: TAB at 08:05

## 2021-05-02 RX ADMIN — RISPERIDONE 0.5 MG: 0.5 TABLET, ORALLY DISINTEGRATING ORAL at 08:05

## 2021-05-02 RX ADMIN — PANTOPRAZOLE SODIUM 40 MG: 40 INJECTION, POWDER, FOR SOLUTION INTRAVENOUS at 08:05

## 2021-05-02 RX ADMIN — FINASTERIDE 5 MG: 5 TABLET, FILM COATED ORAL at 08:05

## 2021-05-05 ENCOUNTER — OUTSIDE PLACE OF SERVICE (OUTPATIENT)
Dept: FAMILY MEDICINE | Facility: CLINIC | Age: 86
End: 2021-05-05
Payer: MEDICARE

## 2021-05-05 PROCEDURE — 99307 SBSQ NF CARE SF MDM 10: CPT | Mod: GW,,, | Performed by: FAMILY MEDICINE

## 2021-05-05 PROCEDURE — 99307 PR NURSING FAC CARE, SUBSEQ, IMPROVING: ICD-10-PCS | Mod: GW,,, | Performed by: FAMILY MEDICINE

## 2021-05-11 ENCOUNTER — PES CALL (OUTPATIENT)
Dept: ADMINISTRATIVE | Facility: CLINIC | Age: 86
End: 2021-05-11

## 2021-07-08 ENCOUNTER — OUTSIDE PLACE OF SERVICE (OUTPATIENT)
Dept: FAMILY MEDICINE | Facility: CLINIC | Age: 86
End: 2021-07-08
Payer: MEDICARE

## 2021-07-08 PROCEDURE — 99308 SBSQ NF CARE LOW MDM 20: CPT | Mod: GW,ICN,, | Performed by: FAMILY MEDICINE

## 2021-07-08 PROCEDURE — 99308 PR NURSING FAC CARE, SUBSEQ, MINOR COMPLIC: ICD-10-PCS | Mod: GW,ICN,, | Performed by: FAMILY MEDICINE

## 2021-08-05 ENCOUNTER — LAB VISIT (OUTPATIENT)
Dept: LAB | Facility: OTHER | Age: 86
End: 2021-08-05
Payer: MEDICARE

## 2021-08-05 DIAGNOSIS — Z20.822 ENCOUNTER FOR LABORATORY TESTING FOR COVID-19 VIRUS: ICD-10-CM

## 2021-08-05 PROCEDURE — U0003 INFECTIOUS AGENT DETECTION BY NUCLEIC ACID (DNA OR RNA); SEVERE ACUTE RESPIRATORY SYNDROME CORONAVIRUS 2 (SARS-COV-2) (CORONAVIRUS DISEASE [COVID-19]), AMPLIFIED PROBE TECHNIQUE, MAKING USE OF HIGH THROUGHPUT TECHNOLOGIES AS DESCRIBED BY CMS-2020-01-R: HCPCS | Performed by: NURSE PRACTITIONER

## 2021-08-07 LAB
SARS-COV-2 RNA RESP QL NAA+PROBE: NOT DETECTED
SARS-COV-2- CYCLE NUMBER: -1

## 2021-08-12 ENCOUNTER — LAB VISIT (OUTPATIENT)
Dept: LAB | Facility: OTHER | Age: 86
End: 2021-08-12
Payer: MEDICARE

## 2021-08-12 DIAGNOSIS — Z20.822 ENCOUNTER FOR LABORATORY TESTING FOR COVID-19 VIRUS: ICD-10-CM

## 2021-08-12 PROCEDURE — U0003 INFECTIOUS AGENT DETECTION BY NUCLEIC ACID (DNA OR RNA); SEVERE ACUTE RESPIRATORY SYNDROME CORONAVIRUS 2 (SARS-COV-2) (CORONAVIRUS DISEASE [COVID-19]), AMPLIFIED PROBE TECHNIQUE, MAKING USE OF HIGH THROUGHPUT TECHNOLOGIES AS DESCRIBED BY CMS-2020-01-R: HCPCS | Performed by: NURSE PRACTITIONER

## 2021-08-16 LAB
SARS-COV-2 RNA RESP QL NAA+PROBE: NORMAL
TEST PERFORMANCE INFO SPEC: NORMAL

## 2022-12-09 ENCOUNTER — TELEPHONE (OUTPATIENT)
Dept: FAMILY MEDICINE | Facility: CLINIC | Age: 87
End: 2022-12-09
Payer: MEDICARE